# Patient Record
Sex: MALE | Race: WHITE | URBAN - METROPOLITAN AREA
[De-identification: names, ages, dates, MRNs, and addresses within clinical notes are randomized per-mention and may not be internally consistent; named-entity substitution may affect disease eponyms.]

---

## 2017-09-11 ENCOUNTER — EMERGENCY (EMERGENCY)
Facility: HOSPITAL | Age: 82
LOS: 0 days | Discharge: HOME | End: 2017-09-11
Attending: EMERGENCY MEDICINE | Admitting: EMERGENCY MEDICINE
Payer: COMMERCIAL

## 2017-09-11 VITALS
TEMPERATURE: 98 F | HEIGHT: 72 IN | OXYGEN SATURATION: 99 % | HEART RATE: 102 BPM | WEIGHT: 190.04 LBS | SYSTOLIC BLOOD PRESSURE: 156 MMHG | DIASTOLIC BLOOD PRESSURE: 87 MMHG | RESPIRATION RATE: 17 BRPM

## 2017-09-11 VITALS
DIASTOLIC BLOOD PRESSURE: 89 MMHG | HEART RATE: 92 BPM | SYSTOLIC BLOOD PRESSURE: 144 MMHG | TEMPERATURE: 98 F | OXYGEN SATURATION: 100 % | RESPIRATION RATE: 16 BRPM

## 2017-09-11 DIAGNOSIS — S22.009A UNSPECIFIED FRACTURE OF UNSPECIFIED THORACIC VERTEBRA, INITIAL ENCOUNTER FOR CLOSED FRACTURE: ICD-10-CM

## 2017-09-11 DIAGNOSIS — M54.2 CERVICALGIA: ICD-10-CM
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
## 2017-09-11 DIAGNOSIS — E78.5 HYPERLIPIDEMIA, UNSPECIFIED: ICD-10-CM

## 2017-09-11 DIAGNOSIS — I10 ESSENTIAL (PRIMARY) HYPERTENSION: ICD-10-CM

## 2017-09-11 DIAGNOSIS — Z98.890 OTHER SPECIFIED POSTPROCEDURAL STATES: Chronic | ICD-10-CM
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
## 2017-09-11 DIAGNOSIS — S16.1XXA STRAIN OF MUSCLE, FASCIA AND TENDON AT NECK LEVEL, INITIAL ENCOUNTER: ICD-10-CM
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
## 2017-09-11 DIAGNOSIS — E11.9 TYPE 2 DIABETES MELLITUS WITHOUT COMPLICATIONS: ICD-10-CM

## 2017-09-11 LAB
ALBUMIN SERPL ELPH-MCNC: 3.5 G/DL — SIGNIFICANT CHANGE UP (ref 3.3–5)
ALP SERPL-CCNC: 90 U/L — SIGNIFICANT CHANGE UP (ref 40–120)
ALT FLD-CCNC: 76 U/L — SIGNIFICANT CHANGE UP (ref 12–78)
ANION GAP SERPL CALC-SCNC: 4 MMOL/L — LOW (ref 5–17)
AST SERPL-CCNC: 70 U/L — HIGH (ref 15–37)
BASOPHILS # BLD AUTO: 0 K/UL — SIGNIFICANT CHANGE UP (ref 0–0.2)
BASOPHILS NFR BLD AUTO: 0.4 % — SIGNIFICANT CHANGE UP (ref 0–2)
BILIRUB SERPL-MCNC: 0.6 MG/DL — SIGNIFICANT CHANGE UP (ref 0.2–1.2)
BUN SERPL-MCNC: 20 MG/DL — SIGNIFICANT CHANGE UP (ref 7–23)
CALCIUM SERPL-MCNC: 9.4 MG/DL — SIGNIFICANT CHANGE UP (ref 8.5–10.1)
CHLORIDE SERPL-SCNC: 108 MMOL/L — SIGNIFICANT CHANGE UP (ref 96–108)
CO2 SERPL-SCNC: 31 MMOL/L — SIGNIFICANT CHANGE UP (ref 22–31)
CREAT SERPL-MCNC: 0.95 MG/DL — SIGNIFICANT CHANGE UP (ref 0.5–1.3)
EOSINOPHIL # BLD AUTO: 0.1 K/UL — SIGNIFICANT CHANGE UP (ref 0–0.5)
EOSINOPHIL NFR BLD AUTO: 2.1 % — SIGNIFICANT CHANGE UP (ref 0–6)
GLUCOSE SERPL-MCNC: 121 MG/DL — HIGH (ref 70–99)
HCT VFR BLD CALC: 38.3 % — LOW (ref 39–50)
HGB BLD-MCNC: 13.2 G/DL — SIGNIFICANT CHANGE UP (ref 13–17)
LYMPHOCYTES # BLD AUTO: 1 K/UL — SIGNIFICANT CHANGE UP (ref 1–3.3)
LYMPHOCYTES # BLD AUTO: 15.5 % — SIGNIFICANT CHANGE UP (ref 13–44)
MCHC RBC-ENTMCNC: 31.8 PG — SIGNIFICANT CHANGE UP (ref 27–34)
MCHC RBC-ENTMCNC: 34.6 GM/DL — SIGNIFICANT CHANGE UP (ref 32–36)
MCV RBC AUTO: 91.9 FL — SIGNIFICANT CHANGE UP (ref 80–100)
MONOCYTES # BLD AUTO: 0.3 K/UL — SIGNIFICANT CHANGE UP (ref 0–0.9)
MONOCYTES NFR BLD AUTO: 5 % — SIGNIFICANT CHANGE UP (ref 2–14)
NEUTROPHILS # BLD AUTO: 5.2 K/UL — SIGNIFICANT CHANGE UP (ref 1.8–7.4)
NEUTROPHILS NFR BLD AUTO: 77 % — SIGNIFICANT CHANGE UP (ref 43–77)
PLATELET # BLD AUTO: 219 K/UL — SIGNIFICANT CHANGE UP (ref 150–400)
POTASSIUM SERPL-MCNC: 4.3 MMOL/L — SIGNIFICANT CHANGE UP (ref 3.5–5.3)
POTASSIUM SERPL-SCNC: 4.3 MMOL/L — SIGNIFICANT CHANGE UP (ref 3.5–5.3)
PROT SERPL-MCNC: 7 GM/DL — SIGNIFICANT CHANGE UP (ref 6–8.3)
RBC # BLD: 4.16 M/UL — LOW (ref 4.2–5.8)
RBC # FLD: 13 % — SIGNIFICANT CHANGE UP (ref 10.3–14.5)
SODIUM SERPL-SCNC: 143 MMOL/L — SIGNIFICANT CHANGE UP (ref 135–145)
WBC # BLD: 6.7 K/UL — SIGNIFICANT CHANGE UP (ref 3.8–10.5)
WBC # FLD AUTO: 6.7 K/UL — SIGNIFICANT CHANGE UP (ref 3.8–10.5)

## 2017-09-11 PROCEDURE — 71250 CT THORAX DX C-: CPT | Mod: 26

## 2017-09-11 PROCEDURE — 70450 CT HEAD/BRAIN W/O DYE: CPT | Mod: 26

## 2017-09-11 PROCEDURE — 74176 CT ABD & PELVIS W/O CONTRAST: CPT | Mod: 26

## 2017-09-11 PROCEDURE — 72125 CT NECK SPINE W/O DYE: CPT | Mod: 26

## 2017-09-11 PROCEDURE — 99285 EMERGENCY DEPT VISIT HI MDM: CPT

## 2017-09-11 RX ORDER — SODIUM CHLORIDE 9 MG/ML
500 INJECTION INTRAMUSCULAR; INTRAVENOUS; SUBCUTANEOUS ONCE
Qty: 0 | Refills: 0 | Status: COMPLETED | OUTPATIENT
Start: 2017-09-11 | End: 2017-09-11

## 2017-09-11 RX ADMIN — SODIUM CHLORIDE 500 MILLILITER(S): 9 INJECTION INTRAMUSCULAR; INTRAVENOUS; SUBCUTANEOUS at 19:49

## 2017-09-11 NOTE — ED ADULT NURSE NOTE - OBJECTIVE STATEMENT
s/p mechanical fall backwards, lost balance while walking dog. Denies LOC or head injury, c/o neck pain, pain between shoulderblades.

## 2017-09-11 NOTE — ED PROVIDER NOTE - CARE PLAN
Principal Discharge DX:	Muscle strain  Secondary Diagnosis:	Fall, initial encounter  Secondary Diagnosis:	Compression fracture of thoracic vertebra, closed, initial encounter

## 2017-09-11 NOTE — ED PROVIDER NOTE - OBJECTIVE STATEMENT
82 yo male h/o rheumatoid arthritis, s/p abd hernia repair x2, presents s/p mechanical fall in his backyard today States the ground is not level so he slipped and fell backwards and hit his head on the ground. C/o neck pain and back pain at this time. Denies CP prior to and after the fall.  Denies SOB, palpitations, fever, chills, dysuria. Denies hx of HTN, HLD, DM, MI, stents, or strokes. 84 yo male h/o rheumatoid arthritis, s/p abd hernia repair x2, presents s/p mechanical fall in his backyard today States the ground is not level so he slipped and fell backwards and hit his head on the ground. No loc. C/o neck pain and back pain at this time. Denies CP prior to and after the fall.  Denies SOB, palpitations, fever, chills, dysuria. Denies hx of HTN, HLD, DM, MI, stents, or strokes.

## 2017-09-11 NOTE — ED PROVIDER NOTE - PROGRESS NOTE DETAILS
Ashok Boss (scribe) for Dr. Krishnamurthy: offered patient further evaluation and testing which patient declined, stating the fall was purely mechanical Ashok Boss (scribe) for Dr. Krishnamurthy: Spoke with Dr. Pearce (Trauma), states findings on CT are likely chronic and suggests to consult Spine to ensure no intervention is needed Ashok Boss ([facility]) for Dr. Krishnamurthy: No acute fracture found. Mild, chronic-appearing compression fx of T12. Discussed findings with patient and family. Will discharge pending Spine consult Ashok Boss (scribe) for Dr. Krishnamurthy: Spoke with Dr. Prabhakar (Spine), states that based on CT findings and pt's lack of neurological deficits, pt is stable to go home and follow up with him as an out-patient

## 2017-09-11 NOTE — ED PROVIDER NOTE - MUSCULOSKELETAL, MLM
+Tenderness to palpation of paracervical neck and parathoracic spine. +Tenderness to palpation of paracervical spine and para thoracic spine musculature.

## 2017-09-11 NOTE — ED PROVIDER NOTE - NS_ ATTENDINGSCRIBEDETAILS _ED_A_ED_FT
I Dillon Krishnamurthy MD saw and examined the patient. Scribe documented for me and under my  supervision. I have modified the documentation where necessary to reflect my history and physical exam findings.

## 2017-09-11 NOTE — ED ADULT TRIAGE NOTE - CHIEF COMPLAINT QUOTE
pt was walking the dog in his backyard , pt has dips in his grass , report slipping and falling , reports neck pain

## 2017-09-11 NOTE — ED PROVIDER NOTE - MEDICAL DECISION MAKING DETAILS
84 yo male with neck and back pain s/p mechanical fall. Plan CT head and chest. 84 yo male with neck and back pain s/p mechanical fall. Plan CT head, c-spine, chest, A/P. 84 yo male with neck and back pain s/p mechanical fall. Plan CT head, c-spine, chest, A/P.  Raghu WILSON: Spoke with Ortho spine Dr. Kirk. No acute intervention at this time. Outpatient follow up recommended. Patient offered in ED MRI but declines states he is ambulatory, has alleviation of his pain and wishes to leave. Prior to patient leaving ambulated patient. Patient urinating with no incontinence of retention. No fever or chills. 84 yo male with neck and back pain s/p mechanical fall. Plan CT head, c-spine, chest, A/P.  Raghu WILSON: Spoke with Ortho spine Dr. Prabhakar. No acute intervention at this time. Outpatient follow up recommended. Patient offered in ED MRI but declines states he is ambulatory, has alleviation of his pain and wishes to leave. Prior to patient leaving ambulated patient. Patient urinating with no incontinence of retention. No fever or chills.

## 2020-05-23 ENCOUNTER — EMERGENCY (EMERGENCY)
Facility: HOSPITAL | Age: 85
LOS: 0 days | Discharge: HOME | End: 2020-05-23
Attending: EMERGENCY MEDICINE
Payer: COMMERCIAL

## 2020-05-23 VITALS
DIASTOLIC BLOOD PRESSURE: 96 MMHG | SYSTOLIC BLOOD PRESSURE: 168 MMHG | HEART RATE: 86 BPM | OXYGEN SATURATION: 98 % | RESPIRATION RATE: 18 BRPM

## 2020-05-23 VITALS — WEIGHT: 179.9 LBS | HEIGHT: 74 IN

## 2020-05-23 DIAGNOSIS — M06.9 RHEUMATOID ARTHRITIS, UNSPECIFIED: ICD-10-CM

## 2020-05-23 DIAGNOSIS — K57.92 DIVERTICULITIS OF INTESTINE, PART UNSPECIFIED, WITHOUT PERFORATION OR ABSCESS WITHOUT BLEEDING: ICD-10-CM

## 2020-05-23 DIAGNOSIS — Z88.0 ALLERGY STATUS TO PENICILLIN: ICD-10-CM

## 2020-05-23 DIAGNOSIS — Z98.890 OTHER SPECIFIED POSTPROCEDURAL STATES: Chronic | ICD-10-CM

## 2020-05-23 DIAGNOSIS — R10.9 UNSPECIFIED ABDOMINAL PAIN: ICD-10-CM

## 2020-05-23 LAB
ALBUMIN SERPL ELPH-MCNC: 4.1 G/DL — SIGNIFICANT CHANGE UP (ref 3.3–5)
ALP SERPL-CCNC: 88 U/L — SIGNIFICANT CHANGE UP (ref 40–120)
ALT FLD-CCNC: 28 U/L — SIGNIFICANT CHANGE UP (ref 12–78)
ANION GAP SERPL CALC-SCNC: 6 MMOL/L — SIGNIFICANT CHANGE UP (ref 5–17)
APPEARANCE UR: CLEAR — SIGNIFICANT CHANGE UP
AST SERPL-CCNC: 23 U/L — SIGNIFICANT CHANGE UP (ref 15–37)
BASOPHILS # BLD AUTO: 0.01 K/UL — SIGNIFICANT CHANGE UP (ref 0–0.2)
BASOPHILS NFR BLD AUTO: 0.3 % — SIGNIFICANT CHANGE UP (ref 0–2)
BILIRUB SERPL-MCNC: 1 MG/DL — SIGNIFICANT CHANGE UP (ref 0.2–1.2)
BILIRUB UR-MCNC: NEGATIVE — SIGNIFICANT CHANGE UP
BUN SERPL-MCNC: 17 MG/DL — SIGNIFICANT CHANGE UP (ref 7–23)
CALCIUM SERPL-MCNC: 9.4 MG/DL — SIGNIFICANT CHANGE UP (ref 8.5–10.1)
CHLORIDE SERPL-SCNC: 110 MMOL/L — HIGH (ref 96–108)
CO2 SERPL-SCNC: 25 MMOL/L — SIGNIFICANT CHANGE UP (ref 22–31)
COLOR SPEC: YELLOW — SIGNIFICANT CHANGE UP
CREAT SERPL-MCNC: 1.05 MG/DL — SIGNIFICANT CHANGE UP (ref 0.5–1.3)
DIFF PNL FLD: NEGATIVE — SIGNIFICANT CHANGE UP
EOSINOPHIL # BLD AUTO: 0.13 K/UL — SIGNIFICANT CHANGE UP (ref 0–0.5)
EOSINOPHIL NFR BLD AUTO: 3.4 % — SIGNIFICANT CHANGE UP (ref 0–6)
GLUCOSE SERPL-MCNC: 120 MG/DL — HIGH (ref 70–99)
GLUCOSE UR QL: 50 MG/DL
HCT VFR BLD CALC: 43.8 % — SIGNIFICANT CHANGE UP (ref 39–50)
HGB BLD-MCNC: 15.1 G/DL — SIGNIFICANT CHANGE UP (ref 13–17)
IMM GRANULOCYTES NFR BLD AUTO: 0.3 % — SIGNIFICANT CHANGE UP (ref 0–1.5)
KETONES UR-MCNC: NEGATIVE — SIGNIFICANT CHANGE UP
LEUKOCYTE ESTERASE UR-ACNC: NEGATIVE — SIGNIFICANT CHANGE UP
LIDOCAIN IGE QN: 122 U/L — SIGNIFICANT CHANGE UP (ref 73–393)
LYMPHOCYTES # BLD AUTO: 1.62 K/UL — SIGNIFICANT CHANGE UP (ref 1–3.3)
LYMPHOCYTES # BLD AUTO: 42.6 % — SIGNIFICANT CHANGE UP (ref 13–44)
MCHC RBC-ENTMCNC: 31.9 PG — SIGNIFICANT CHANGE UP (ref 27–34)
MCHC RBC-ENTMCNC: 34.5 GM/DL — SIGNIFICANT CHANGE UP (ref 32–36)
MCV RBC AUTO: 92.4 FL — SIGNIFICANT CHANGE UP (ref 80–100)
MONOCYTES # BLD AUTO: 0.44 K/UL — SIGNIFICANT CHANGE UP (ref 0–0.9)
MONOCYTES NFR BLD AUTO: 11.6 % — SIGNIFICANT CHANGE UP (ref 2–14)
NEUTROPHILS # BLD AUTO: 1.59 K/UL — LOW (ref 1.8–7.4)
NEUTROPHILS NFR BLD AUTO: 41.8 % — LOW (ref 43–77)
NITRITE UR-MCNC: NEGATIVE — SIGNIFICANT CHANGE UP
PH UR: 5 — SIGNIFICANT CHANGE UP (ref 5–8)
PLATELET # BLD AUTO: 120 K/UL — LOW (ref 150–400)
POTASSIUM SERPL-MCNC: 4.3 MMOL/L — SIGNIFICANT CHANGE UP (ref 3.5–5.3)
POTASSIUM SERPL-SCNC: 4.3 MMOL/L — SIGNIFICANT CHANGE UP (ref 3.5–5.3)
PROT SERPL-MCNC: 7.4 GM/DL — SIGNIFICANT CHANGE UP (ref 6–8.3)
PROT UR-MCNC: NEGATIVE MG/DL — SIGNIFICANT CHANGE UP
RBC # BLD: 4.74 M/UL — SIGNIFICANT CHANGE UP (ref 4.2–5.8)
RBC # FLD: 13.2 % — SIGNIFICANT CHANGE UP (ref 10.3–14.5)
SODIUM SERPL-SCNC: 141 MMOL/L — SIGNIFICANT CHANGE UP (ref 135–145)
SP GR SPEC: 1.02 — SIGNIFICANT CHANGE UP (ref 1.01–1.02)
UROBILINOGEN FLD QL: NEGATIVE MG/DL — SIGNIFICANT CHANGE UP
WBC # BLD: 3.8 K/UL — SIGNIFICANT CHANGE UP (ref 3.8–10.5)
WBC # FLD AUTO: 3.8 K/UL — SIGNIFICANT CHANGE UP (ref 3.8–10.5)

## 2020-05-23 PROCEDURE — 99283 EMERGENCY DEPT VISIT LOW MDM: CPT

## 2020-05-23 PROCEDURE — 83690 ASSAY OF LIPASE: CPT

## 2020-05-23 PROCEDURE — 74177 CT ABD & PELVIS W/CONTRAST: CPT | Mod: 26

## 2020-05-23 PROCEDURE — 80053 COMPREHEN METABOLIC PANEL: CPT

## 2020-05-23 PROCEDURE — 87086 URINE CULTURE/COLONY COUNT: CPT

## 2020-05-23 PROCEDURE — 74177 CT ABD & PELVIS W/CONTRAST: CPT

## 2020-05-23 PROCEDURE — 85025 COMPLETE CBC W/AUTO DIFF WBC: CPT

## 2020-05-23 PROCEDURE — 99284 EMERGENCY DEPT VISIT MOD MDM: CPT | Mod: 25

## 2020-05-23 PROCEDURE — 81003 URINALYSIS AUTO W/O SCOPE: CPT

## 2020-05-23 PROCEDURE — 36415 COLL VENOUS BLD VENIPUNCTURE: CPT

## 2020-05-23 RX ORDER — METRONIDAZOLE 500 MG
500 TABLET ORAL ONCE
Refills: 0 | Status: COMPLETED | OUTPATIENT
Start: 2020-05-23 | End: 2020-05-23

## 2020-05-23 RX ORDER — CIPROFLOXACIN LACTATE 400MG/40ML
500 VIAL (ML) INTRAVENOUS ONCE
Refills: 0 | Status: COMPLETED | OUTPATIENT
Start: 2020-05-23 | End: 2020-05-23

## 2020-05-23 RX ORDER — METRONIDAZOLE 500 MG
1 TABLET ORAL
Qty: 21 | Refills: 0
Start: 2020-05-23 | End: 2020-05-29

## 2020-05-23 RX ORDER — MOXIFLOXACIN HYDROCHLORIDE TABLETS, 400 MG 400 MG/1
1 TABLET, FILM COATED ORAL
Qty: 14 | Refills: 0
Start: 2020-05-23 | End: 2020-05-29

## 2020-05-23 RX ADMIN — Medication 500 MILLIGRAM(S): at 12:51

## 2020-05-23 NOTE — ED PROVIDER NOTE - CARE PROVIDER_API CALL
Zeke Cali)  Gastroenterology; Internal Medicine  [address] [place] [address]  Phone: [phone]  Fax: [phone]  Follow Up Time: Urgent

## 2020-05-23 NOTE — ED PROVIDER NOTE - PROGRESS NOTE DETAILS
Results reviewed and discussed with pt. pt with mild diverticulitis, will tx with cipro/flagyl as pt as pcn allergy. Made aware of mesenteric panniculitis, will FU with GI. Discussed importance of close FU with PMD. Pt asked to return to ED immediately for any new or concerning sx or worsening. Pt acknowledges and understands plan -Saurabh Cazares PA-C pt seen and ex with PA. 87 yo male with llq pain. for 1 wk. mild ttp llq. no rebound no guear. pt well appearing. stable vs .plan for labs, ct, ivfs, reEval MD JEN pt seen and ex with PA. 87 yo male with llq pain. for 1 wk. mild ttp llq. no rebound no guarding. pt well appearing. stable vs .plan for labs, ct, ivfs, reEval MD JEN

## 2020-05-23 NOTE — ED PROVIDER NOTE - NSFOLLOWUPINSTRUCTIONS_ED_ALL_ED_FT
Please follow up with your Primary MD in 2-3 days. Return to ED immediately for any new or concerning symptoms or worsening symptoms.     Diverticulitis    WHAT YOU NEED TO KNOW:    Diverticulitis is a condition that causes small pockets along your intestine called diverticula to become inflamed or infected. This is caused by hard bowel movements, food, or bacteria that get stuck in the pockets.         DISCHARGE INSTRUCTIONS:    Return to the emergency department if:     You have bowel movement or foul-smelling discharge leaking from your vagina or in your urine.      You have severe diarrhea.      You urinate less than usual or not at all.      You are not able to have a bowel movement.      You cannot stop vomiting.       You have severe abdominal pain, a fever, and your abdomen is larger than usual.       You have new or increased blood in your bowel movements.     Contact your healthcare provider if:     You have pain when you urinate.      Your symptoms get worse or do not go away.       You have questions or concerns about your condition or care.     Medicines:     Antibiotics may be given to help treat a bacterial infection.      Prescription pain medicine may be given. Ask your healthcare provider how to take this medicine safely. Some prescription pain medicines contain acetaminophen. Do not take other medicines that contain acetaminophen without talking to your healthcare provider. Too much acetaminophen may cause liver damage. Prescription pain medicine may cause constipation. Ask your healthcare provider how to prevent or treat constipation.       Take your medicine as directed. Contact your healthcare provider if you think your medicine is not helping or if you have side effects. Tell him or her if you are allergic to any medicine. Keep a list of the medicines, vitamins, and herbs you take. Include the amounts, and when and why you take them. Bring the list or the pill bottles to follow-up visits. Carry your medicine list with you in case of an emergency.    Clear liquid diet: A clear liquid diet includes any liquids that you can see through. Examples include water, ginger-ailyn, cranberry or apple juice, frozen fruit ice, or broth. Stay on a clear liquid diet until your symptoms are gone, or as directed.     Follow up with your healthcare provider as directed: You may need to return for a colonoscopy. When your symptoms are gone, you may need a low-fat, high-fiber diet to prevent diverticulitis from developing again. Your healthcare provider or dietitian can help you create meal plans. Write down your questions so you remember to ask them during your visits.

## 2020-05-23 NOTE — ED PROVIDER NOTE - PATIENT PORTAL LINK FT
You can access the FollowMyHealth Patient Portal offered by [facility] by registering at the following website: [email or web address]. By joining [facility]’s FollowMyHealth portal, you will also be able to view your health information using other applications (apps) compatible with our system.

## 2020-05-23 NOTE — ED PROVIDER NOTE - OBJECTIVE STATEMENT
85 y/o M presents with abdominal pain x 1 week. Pt reports LLQ pain  moderate in intensity that occasionally radiates around the side to his back, worse when taking a deep breath in and having a BM. He has been straining when having a BM. Reports intermittent dysuria. Denies fever/chills, n/v, Cp, SOB, rectal bleeding, or other complaints at this time. -Saurabh Cazares PA-C

## 2020-05-24 LAB
CULTURE RESULTS: SIGNIFICANT CHANGE UP
SPECIMEN SOURCE: SIGNIFICANT CHANGE UP

## 2021-03-07 ENCOUNTER — INPATIENT (INPATIENT)
Facility: HOSPITAL | Age: 86
LOS: 1 days | Discharge: HOME | DRG: 66 | End: 2021-03-09
Attending: FAMILY MEDICINE | Admitting: INTERNAL MEDICINE
Payer: MEDICARE

## 2021-03-07 VITALS
WEIGHT: 184.97 LBS | SYSTOLIC BLOOD PRESSURE: 155 MMHG | HEART RATE: 72 BPM | DIASTOLIC BLOOD PRESSURE: 70 MMHG | RESPIRATION RATE: 18 BRPM | TEMPERATURE: 97 F | OXYGEN SATURATION: 95 %

## 2021-03-07 DIAGNOSIS — Z98.890 OTHER SPECIFIED POSTPROCEDURAL STATES: Chronic | ICD-10-CM

## 2021-03-07 PROCEDURE — 71045 X-RAY EXAM CHEST 1 VIEW: CPT | Mod: 26

## 2021-03-07 NOTE — ED PROVIDER NOTE - CONSTITUTIONAL, MLM
normal... Well appearing, awake, alert, oriented to person, place, time/situation and in no apparent distress.  Normal facial symmetry

## 2021-03-07 NOTE — ED ADULT NURSE NOTE - OBJECTIVE STATEMENT
pt presents with slurred speech that started this morning when he woke up. denies difficulty swallowing or difficulty ambulating. no weakness noted

## 2021-03-07 NOTE — ED PROVIDER NOTE - OBJECTIVE STATEMENT
Pt. is a 85 yo M with a hx of diverticulitis, hernia repair, rheumatoid arthritis BIB ambulance for slurred speech.  Per EMS family noticed slurring of his words around 10AM today.  Patient states he woke up like that and states "I've been annoyed by it all day."  Patient denies headache, falls, chest pain, back pain, weakness or numbness.  +nausea daily for weeks.  Eating meals normally without trouble swallowing, denies pain in mouth, fever, or vomiting. PMD: Rubin

## 2021-03-07 NOTE — ED PROVIDER NOTE - CLINICAL SUMMARY MEDICAL DECISION MAKING FREE TEXT BOX
Slurred speech; labs, EKG, CT head to r/o stroke.  Aspirin to be given and patient to be admitted to tele with MRI tomorrow and neuro consult in AM. NIHSS score = 1 on arrival.

## 2021-03-07 NOTE — ED ADULT TRIAGE NOTE - CHIEF COMPLAINT QUOTE
Pt BIBA with c/o of slurred speech since 10am.  Pt with no facial paralysis, no weakness, denies blurry vision/headaches.  PT with no complaints other than slurred speech.  Pt able to speak in full sentences.  MD Banks made aware

## 2021-03-08 ENCOUNTER — TRANSCRIPTION ENCOUNTER (OUTPATIENT)
Age: 86
End: 2021-03-08

## 2021-03-08 VITALS
HEART RATE: 77 BPM | DIASTOLIC BLOOD PRESSURE: 63 MMHG | SYSTOLIC BLOOD PRESSURE: 129 MMHG | TEMPERATURE: 98 F | OXYGEN SATURATION: 99 % | RESPIRATION RATE: 18 BRPM

## 2021-03-08 DIAGNOSIS — R47.81 SLURRED SPEECH: ICD-10-CM

## 2021-03-08 DIAGNOSIS — Z90.49 ACQUIRED ABSENCE OF OTHER SPECIFIED PARTS OF DIGESTIVE TRACT: Chronic | ICD-10-CM

## 2021-03-08 PROBLEM — K57.92 DIVERTICULITIS OF INTESTINE, PART UNSPECIFIED, WITHOUT PERFORATION OR ABSCESS WITHOUT BLEEDING: Chronic | Status: ACTIVE | Noted: 2020-05-23

## 2021-03-08 LAB
A1C WITH ESTIMATED AVERAGE GLUCOSE RESULT: 5.7 % — HIGH (ref 4–5.6)
ALBUMIN SERPL ELPH-MCNC: 3.5 G/DL — SIGNIFICANT CHANGE UP (ref 3.3–5)
ALP SERPL-CCNC: 111 U/L — SIGNIFICANT CHANGE UP (ref 40–120)
ALT FLD-CCNC: 21 U/L — SIGNIFICANT CHANGE UP (ref 12–78)
ANION GAP SERPL CALC-SCNC: 5 MMOL/L — SIGNIFICANT CHANGE UP (ref 5–17)
ANION GAP SERPL CALC-SCNC: 6 MMOL/L — SIGNIFICANT CHANGE UP (ref 5–17)
APPEARANCE UR: CLEAR — SIGNIFICANT CHANGE UP
APTT BLD: 29.8 SEC — SIGNIFICANT CHANGE UP (ref 27.5–35.5)
AST SERPL-CCNC: 15 U/L — SIGNIFICANT CHANGE UP (ref 15–37)
BASOPHILS # BLD AUTO: 0.01 K/UL — SIGNIFICANT CHANGE UP (ref 0–0.2)
BASOPHILS NFR BLD AUTO: 0.2 % — SIGNIFICANT CHANGE UP (ref 0–2)
BILIRUB SERPL-MCNC: 0.7 MG/DL — SIGNIFICANT CHANGE UP (ref 0.2–1.2)
BILIRUB UR-MCNC: NEGATIVE — SIGNIFICANT CHANGE UP
BUN SERPL-MCNC: 14 MG/DL — SIGNIFICANT CHANGE UP (ref 7–23)
BUN SERPL-MCNC: 15 MG/DL — SIGNIFICANT CHANGE UP (ref 7–23)
CALCIUM SERPL-MCNC: 9 MG/DL — SIGNIFICANT CHANGE UP (ref 8.5–10.1)
CALCIUM SERPL-MCNC: 9.4 MG/DL — SIGNIFICANT CHANGE UP (ref 8.5–10.1)
CHLORIDE SERPL-SCNC: 111 MMOL/L — HIGH (ref 96–108)
CHLORIDE SERPL-SCNC: 112 MMOL/L — HIGH (ref 96–108)
CHOLEST SERPL-MCNC: 200 MG/DL — HIGH
CO2 SERPL-SCNC: 25 MMOL/L — SIGNIFICANT CHANGE UP (ref 22–31)
CO2 SERPL-SCNC: 26 MMOL/L — SIGNIFICANT CHANGE UP (ref 22–31)
COLOR SPEC: YELLOW — SIGNIFICANT CHANGE UP
CREAT SERPL-MCNC: 1.06 MG/DL — SIGNIFICANT CHANGE UP (ref 0.5–1.3)
CREAT SERPL-MCNC: 1.06 MG/DL — SIGNIFICANT CHANGE UP (ref 0.5–1.3)
DIFF PNL FLD: NEGATIVE — SIGNIFICANT CHANGE UP
EOSINOPHIL # BLD AUTO: 0.38 K/UL — SIGNIFICANT CHANGE UP (ref 0–0.5)
EOSINOPHIL NFR BLD AUTO: 5.9 % — SIGNIFICANT CHANGE UP (ref 0–6)
ESTIMATED AVERAGE GLUCOSE: 117 MG/DL — HIGH (ref 68–114)
GLUCOSE SERPL-MCNC: 140 MG/DL — HIGH (ref 70–99)
GLUCOSE SERPL-MCNC: 142 MG/DL — HIGH (ref 70–99)
GLUCOSE UR QL: 250 MG/DL
HCT VFR BLD CALC: 42.9 % — SIGNIFICANT CHANGE UP (ref 39–50)
HCT VFR BLD CALC: 43.5 % — SIGNIFICANT CHANGE UP (ref 39–50)
HDLC SERPL-MCNC: 44 MG/DL — SIGNIFICANT CHANGE UP
HGB BLD-MCNC: 14.4 G/DL — SIGNIFICANT CHANGE UP (ref 13–17)
HGB BLD-MCNC: 14.8 G/DL — SIGNIFICANT CHANGE UP (ref 13–17)
IMM GRANULOCYTES NFR BLD AUTO: 0.2 % — SIGNIFICANT CHANGE UP (ref 0–1.5)
INR BLD: 1.04 RATIO — SIGNIFICANT CHANGE UP (ref 0.88–1.16)
KETONES UR-MCNC: NEGATIVE — SIGNIFICANT CHANGE UP
LEUKOCYTE ESTERASE UR-ACNC: NEGATIVE — SIGNIFICANT CHANGE UP
LIPID PNL WITH DIRECT LDL SERPL: 124 MG/DL — HIGH
LYMPHOCYTES # BLD AUTO: 2.97 K/UL — SIGNIFICANT CHANGE UP (ref 1–3.3)
LYMPHOCYTES # BLD AUTO: 46.2 % — HIGH (ref 13–44)
MCHC RBC-ENTMCNC: 31.6 PG — SIGNIFICANT CHANGE UP (ref 27–34)
MCHC RBC-ENTMCNC: 31.9 PG — SIGNIFICANT CHANGE UP (ref 27–34)
MCHC RBC-ENTMCNC: 33.6 GM/DL — SIGNIFICANT CHANGE UP (ref 32–36)
MCHC RBC-ENTMCNC: 34 GM/DL — SIGNIFICANT CHANGE UP (ref 32–36)
MCV RBC AUTO: 93.8 FL — SIGNIFICANT CHANGE UP (ref 80–100)
MCV RBC AUTO: 94.3 FL — SIGNIFICANT CHANGE UP (ref 80–100)
MONOCYTES # BLD AUTO: 0.7 K/UL — SIGNIFICANT CHANGE UP (ref 0–0.9)
MONOCYTES NFR BLD AUTO: 10.9 % — SIGNIFICANT CHANGE UP (ref 2–14)
NEUTROPHILS # BLD AUTO: 2.36 K/UL — SIGNIFICANT CHANGE UP (ref 1.8–7.4)
NEUTROPHILS NFR BLD AUTO: 36.6 % — LOW (ref 43–77)
NITRITE UR-MCNC: NEGATIVE — SIGNIFICANT CHANGE UP
NON HDL CHOLESTEROL: 156 MG/DL — HIGH
PH UR: 5 — SIGNIFICANT CHANGE UP (ref 5–8)
PLATELET # BLD AUTO: 193 K/UL — SIGNIFICANT CHANGE UP (ref 150–400)
PLATELET # BLD AUTO: 201 K/UL — SIGNIFICANT CHANGE UP (ref 150–400)
POTASSIUM SERPL-MCNC: 4.1 MMOL/L — SIGNIFICANT CHANGE UP (ref 3.5–5.3)
POTASSIUM SERPL-MCNC: 4.1 MMOL/L — SIGNIFICANT CHANGE UP (ref 3.5–5.3)
POTASSIUM SERPL-SCNC: 4.1 MMOL/L — SIGNIFICANT CHANGE UP (ref 3.5–5.3)
POTASSIUM SERPL-SCNC: 4.1 MMOL/L — SIGNIFICANT CHANGE UP (ref 3.5–5.3)
PROT SERPL-MCNC: 7.3 GM/DL — SIGNIFICANT CHANGE UP (ref 6–8.3)
PROT UR-MCNC: NEGATIVE MG/DL — SIGNIFICANT CHANGE UP
PROTHROM AB SERPL-ACNC: 12.2 SEC — SIGNIFICANT CHANGE UP (ref 10.6–13.6)
RBC # BLD: 4.55 M/UL — SIGNIFICANT CHANGE UP (ref 4.2–5.8)
RBC # BLD: 4.64 M/UL — SIGNIFICANT CHANGE UP (ref 4.2–5.8)
RBC # FLD: 12.9 % — SIGNIFICANT CHANGE UP (ref 10.3–14.5)
RBC # FLD: 13 % — SIGNIFICANT CHANGE UP (ref 10.3–14.5)
SARS-COV-2 IGG SERPL QL IA: NEGATIVE — SIGNIFICANT CHANGE UP
SARS-COV-2 IGM SERPL IA-ACNC: 0.07 INDEX — SIGNIFICANT CHANGE UP
SARS-COV-2 RNA SPEC QL NAA+PROBE: SIGNIFICANT CHANGE UP
SODIUM SERPL-SCNC: 142 MMOL/L — SIGNIFICANT CHANGE UP (ref 135–145)
SODIUM SERPL-SCNC: 143 MMOL/L — SIGNIFICANT CHANGE UP (ref 135–145)
SP GR SPEC: 1.01 — SIGNIFICANT CHANGE UP (ref 1.01–1.02)
TRIGL SERPL-MCNC: 163 MG/DL — HIGH
TROPONIN I SERPL-MCNC: <0.015 NG/ML — SIGNIFICANT CHANGE UP (ref 0.01–0.04)
UROBILINOGEN FLD QL: NEGATIVE MG/DL — SIGNIFICANT CHANGE UP
WBC # BLD: 5.38 K/UL — SIGNIFICANT CHANGE UP (ref 3.8–10.5)
WBC # BLD: 6.43 K/UL — SIGNIFICANT CHANGE UP (ref 3.8–10.5)
WBC # FLD AUTO: 5.38 K/UL — SIGNIFICANT CHANGE UP (ref 3.8–10.5)
WBC # FLD AUTO: 6.43 K/UL — SIGNIFICANT CHANGE UP (ref 3.8–10.5)

## 2021-03-08 PROCEDURE — 81003 URINALYSIS AUTO W/O SCOPE: CPT

## 2021-03-08 PROCEDURE — 80061 LIPID PANEL: CPT

## 2021-03-08 PROCEDURE — 86769 SARS-COV-2 COVID-19 ANTIBODY: CPT

## 2021-03-08 PROCEDURE — 70551 MRI BRAIN STEM W/O DYE: CPT

## 2021-03-08 PROCEDURE — 97162 PT EVAL MOD COMPLEX 30 MIN: CPT | Mod: GP

## 2021-03-08 PROCEDURE — 97116 GAIT TRAINING THERAPY: CPT | Mod: GP

## 2021-03-08 PROCEDURE — 99222 1ST HOSP IP/OBS MODERATE 55: CPT

## 2021-03-08 PROCEDURE — U0005: CPT

## 2021-03-08 PROCEDURE — 0031A: CPT

## 2021-03-08 PROCEDURE — 93306 TTE W/DOPPLER COMPLETE: CPT | Mod: 26

## 2021-03-08 PROCEDURE — 92610 EVALUATE SWALLOWING FUNCTION: CPT | Mod: GN

## 2021-03-08 PROCEDURE — 92523 SPEECH SOUND LANG COMPREHEN: CPT | Mod: GN

## 2021-03-08 PROCEDURE — 93306 TTE W/DOPPLER COMPLETE: CPT

## 2021-03-08 PROCEDURE — 93010 ELECTROCARDIOGRAM REPORT: CPT

## 2021-03-08 PROCEDURE — 70551 MRI BRAIN STEM W/O DYE: CPT | Mod: 26

## 2021-03-08 PROCEDURE — 85027 COMPLETE CBC AUTOMATED: CPT

## 2021-03-08 PROCEDURE — 99223 1ST HOSP IP/OBS HIGH 75: CPT

## 2021-03-08 PROCEDURE — 83036 HEMOGLOBIN GLYCOSYLATED A1C: CPT

## 2021-03-08 PROCEDURE — 80048 BASIC METABOLIC PNL TOTAL CA: CPT

## 2021-03-08 PROCEDURE — 87635 SARS-COV-2 COVID-19 AMP PRB: CPT

## 2021-03-08 PROCEDURE — 97530 THERAPEUTIC ACTIVITIES: CPT | Mod: GP

## 2021-03-08 PROCEDURE — 70544 MR ANGIOGRAPHY HEAD W/O DYE: CPT | Mod: 26,59

## 2021-03-08 PROCEDURE — 72198 MR ANGIO PELVIS W/O & W/DYE: CPT | Mod: 26

## 2021-03-08 PROCEDURE — 70544 MR ANGIOGRAPHY HEAD W/O DYE: CPT

## 2021-03-08 PROCEDURE — 70547 MR ANGIOGRAPHY NECK W/O DYE: CPT

## 2021-03-08 PROCEDURE — 36415 COLL VENOUS BLD VENIPUNCTURE: CPT

## 2021-03-08 PROCEDURE — 70450 CT HEAD/BRAIN W/O DYE: CPT | Mod: 26

## 2021-03-08 RX ORDER — ACETAMINOPHEN 500 MG
650 TABLET ORAL EVERY 6 HOURS
Refills: 0 | Status: DISCONTINUED | OUTPATIENT
Start: 2021-03-08 | End: 2021-03-09

## 2021-03-08 RX ORDER — ALPRAZOLAM 0.25 MG
0.5 TABLET ORAL ONCE
Refills: 0 | Status: DISCONTINUED | OUTPATIENT
Start: 2021-03-08 | End: 2021-03-08

## 2021-03-08 RX ORDER — ALPRAZOLAM 0.25 MG
0.5 TABLET ORAL ONCE
Refills: 0 | Status: COMPLETED | OUTPATIENT
Start: 2021-03-08 | End: 2021-03-15

## 2021-03-08 RX ORDER — CLOPIDOGREL BISULFATE 75 MG/1
75 TABLET, FILM COATED ORAL DAILY
Refills: 0 | Status: DISCONTINUED | OUTPATIENT
Start: 2021-03-08 | End: 2021-03-09

## 2021-03-08 RX ORDER — ASPIRIN/CALCIUM CARB/MAGNESIUM 324 MG
81 TABLET ORAL DAILY
Refills: 0 | Status: DISCONTINUED | OUTPATIENT
Start: 2021-03-08 | End: 2021-03-09

## 2021-03-08 RX ORDER — ASPIRIN/CALCIUM CARB/MAGNESIUM 324 MG
325 TABLET ORAL ONCE
Refills: 0 | Status: COMPLETED | OUTPATIENT
Start: 2021-03-08 | End: 2021-03-08

## 2021-03-08 RX ORDER — METHOTREXATE 2.5 MG/1
0 TABLET ORAL
Qty: 0 | Refills: 0 | DISCHARGE

## 2021-03-08 RX ORDER — ONDANSETRON 8 MG/1
4 TABLET, FILM COATED ORAL EVERY 6 HOURS
Refills: 0 | Status: DISCONTINUED | OUTPATIENT
Start: 2021-03-08 | End: 2021-03-09

## 2021-03-08 RX ORDER — ATORVASTATIN CALCIUM 80 MG/1
40 TABLET, FILM COATED ORAL AT BEDTIME
Refills: 0 | Status: DISCONTINUED | OUTPATIENT
Start: 2021-03-08 | End: 2021-03-09

## 2021-03-08 RX ORDER — SODIUM CHLORIDE 9 MG/ML
1000 INJECTION INTRAMUSCULAR; INTRAVENOUS; SUBCUTANEOUS
Refills: 0 | Status: DISCONTINUED | OUTPATIENT
Start: 2021-03-08 | End: 2021-03-08

## 2021-03-08 RX ORDER — ENOXAPARIN SODIUM 100 MG/ML
40 INJECTION SUBCUTANEOUS AT BEDTIME
Refills: 0 | Status: DISCONTINUED | OUTPATIENT
Start: 2021-03-08 | End: 2021-03-09

## 2021-03-08 RX ADMIN — SODIUM CHLORIDE 80 MILLILITER(S): 9 INJECTION INTRAMUSCULAR; INTRAVENOUS; SUBCUTANEOUS at 01:06

## 2021-03-08 RX ADMIN — Medication 81 MILLIGRAM(S): at 09:33

## 2021-03-08 RX ADMIN — Medication 0.5 MILLIGRAM(S): at 10:32

## 2021-03-08 RX ADMIN — Medication 0.5 MILLIGRAM(S): at 16:07

## 2021-03-08 RX ADMIN — ENOXAPARIN SODIUM 40 MILLIGRAM(S): 100 INJECTION SUBCUTANEOUS at 22:17

## 2021-03-08 RX ADMIN — Medication 325 MILLIGRAM(S): at 01:06

## 2021-03-08 RX ADMIN — CLOPIDOGREL BISULFATE 75 MILLIGRAM(S): 75 TABLET, FILM COATED ORAL at 12:10

## 2021-03-08 RX ADMIN — ATORVASTATIN CALCIUM 40 MILLIGRAM(S): 80 TABLET, FILM COATED ORAL at 22:17

## 2021-03-08 NOTE — PHYSICAL THERAPY INITIAL EVALUATION ADULT - RANGE OF MOTION EXAMINATION, REHAB EVAL
b/l hands with RA joint degeneration/bilateral upper extremity ROM was WFL (within functional limits)/bilateral lower extremity ROM was WFL (within functional limits)

## 2021-03-08 NOTE — H&P ADULT - NEUROLOGICAL DETAILS
alert and oriented x 3/responds to pain/responds to verbal commands/sensation intact/cranial nerves intact/no spontaneous movement/normal strength

## 2021-03-08 NOTE — PROVIDER CONTACT NOTE (OTHER) - SITUATION
rule out CVA / TIA    left message with ans service for dr to see pt in the morning Psych/Behavioral

## 2021-03-08 NOTE — SWALLOW BEDSIDE ASSESSMENT ADULT - SWALLOW EVAL: RECOMMENDED DIET
SUGGEST A REGULAR TEXTURE DIET WITH THIN LIQUIDS AS HE TOLERATES THE SAME FROM AN OROPHARYNGEAL SWALLOWING STANCE ON EXAM.

## 2021-03-08 NOTE — SWALLOW BEDSIDE ASSESSMENT ADULT - SWALLOW EVAL: DIAGNOSIS
1) The pt demonstrates Oropharyngeal Swallowing abilities which subjectively appears to be grossly within functional parameters for age. NO behavioral aspiration signs. Odynophagia denied.  2) The pt was alert and interactive. His auditory comprehension was functional and pt was able to verbalize during communicative probes. At these times, his speech output was marked by mild articulatory slurring c/w functional Dysarthria. His speech output was intelligible nonetheless and his verbalizations were fluent/linguistically intact/contextually appropriate. Pt is able to verbalize his intents, despite mild functional Dysarthria.

## 2021-03-08 NOTE — SWALLOW BEDSIDE ASSESSMENT ADULT - SLP GENERAL OBSERVATIONS
The pt was alert and interactive. His auditory comprehension was functional and pt was able to verbalize during communicative probes. At these times, his speech output was marked by mild articulatory slurring c/w functional Dysarthria. His speech output was intelligible nonetheless and his verbalizations were fluent/linguistically intact/contextually appropriate. Pt is able to verbalize his intents, despite mild functional Dysarthria.

## 2021-03-08 NOTE — H&P ADULT - ASSESSMENT
87 YO M with a PMH of RA and Diverticulitis presents for Slurred speech since 3/7/21 @ 10 AM. Patient states that he has no other symptoms.    #Slurred Speech  #CVA/TIA  - CT Head negative for acute pathology  - NIHSS = 1  - Neuro Consult for AM  - Neuro Check Q4  - ECG   - Start Asprin 325  - MRI Brain?   - Admit, Monitor on Telemetry  - Speech and Swallow Consult     #HTN  - /70  - No PMH of HTN  - Start Amlodipine 5mg?  - DASH Diet    #Elevated Glucose  - A1c in AM    #RA  - on Humera at Home    #Diet  - DASH Diet        #DVT PPX  - SCD  IMPROVE VTE Individual Risk Assessment    RISK                                                                Points    [  ] Previous VTE                                                  3    [  ] Thrombophilia                                               2    [  ] Lower limb paralysis                                      2        (unable to hold up >15 seconds)      [  ] Current Cancer                                              2         (within 6 months)    [  ] Immobilization > 24 hrs                                1    [  ] ICU/CCU stay > 24 hours                              1    [ X ] Age > 60                                                      1    IMPROVE VTE Score ___1______    IMPROVE Score 0-1: Low Risk, No VTE prophylaxis required for most patients, encourage ambulation.   IMPROVE Score 2-3: At risk, pharmacologic VTE prophylaxis is indicated for most patients (in the absence of a contraindication)  IMPROVE Score > or = 4: High Risk, pharmacologic VTE prophylaxis is indicated for most patients (in the absence of a contraindication) 87 YO M with a PMH of RA and Diverticulitis presents for Slurred speech since 3/7/21 @ 10 AM. Patient states that he has no other symptoms.    #Slurred Speech  #CVA/TIA  - CT Head negative for acute pathology  - NIHSS = 1  - Neuro Consult for AM  - Neuro Check Q4  - ECG   - Start Asprin 325  - MRI Brain?   - Admit, Monitor on Telemetry  - Speech and Swallow Consult     #HTN  - /80  - No PMH of HTN  - Start Amlodipine 5mg?  - DASH Diet    #Elevated Glucose  - A1c in AM    #RA  - on Humera at Home    #Diet  - DASH Diet      #DVT PPX  - SCD  IMPROVE VTE Individual Risk Assessment    RISK                                                                Points    [  ] Previous VTE                                                  3    [  ] Thrombophilia                                               2    [  ] Lower limb paralysis                                      2        (unable to hold up >15 seconds)      [  ] Current Cancer                                              2         (within 6 months)    [  ] Immobilization > 24 hrs                                1    [  ] ICU/CCU stay > 24 hours                              1    [ X ] Age > 60                                                      1    IMPROVE VTE Score ___1______    IMPROVE Score 0-1: Low Risk, No VTE prophylaxis required for most patients, encourage ambulation.   IMPROVE Score 2-3: At risk, pharmacologic VTE prophylaxis is indicated for most patients (in the absence of a contraindication)  IMPROVE Score > or = 4: High Risk, pharmacologic VTE prophylaxis is indicated for most patients (in the absence of a contraindication) 87 YO M with a PMH of RA and Diverticulitis presents for Slurred speech since 3/7/21 @ 10 AM. Patient states that he has no other symptoms.    #Slurred Speech  #CVA/TIA  - CT Head negative for acute pathology  - NIHSS = 1  - Neuro Consult for AM  - Neuro Check Q4  - Start Asprin 81  - Start Atorvastatin 40  - MRI Brain, MRA ordered  - PT Consult   - Continue to Monitor on Telemetry  - Speech and Swallow Consult     #HTN  - /80  - No PMH of HTN  - Permissive HTN  - DASH Diet    #Elevated Glucose  - A1c in AM    #RA  - on Humera at Home    #Diet  - DASH Diet  - Patient has no trouble swallowing  - Speech and Swallow Consult Ordered.     #DVT PPX  - SCD  IMPROVE VTE Individual Risk Assessment    RISK                                                                Points    [  ] Previous VTE                                                  3    [  ] Thrombophilia                                               2    [  ] Lower limb paralysis                                      2        (unable to hold up >15 seconds)      [  ] Current Cancer                                              2         (within 6 months)    [  ] Immobilization > 24 hrs                                1    [  ] ICU/CCU stay > 24 hours                              1    [ X ] Age > 60                                                      1    IMPROVE VTE Score ___1______    IMPROVE Score 0-1: Low Risk, No VTE prophylaxis required for most patients, encourage ambulation.   IMPROVE Score 2-3: At risk, pharmacologic VTE prophylaxis is indicated for most patients (in the absence of a contraindication)  IMPROVE Score > or = 4: High Risk, pharmacologic VTE prophylaxis is indicated for most patients (in the absence of a contraindication)

## 2021-03-08 NOTE — CONSULT NOTE ADULT - SUBJECTIVE AND OBJECTIVE BOX
CC: 86 y old male who presents with a chief complaint of Slurred Speech (08 Mar 2021 09:12)      HPI:  85 YO M with a PMHx of RA, Diverticulitis, presents to ER 23.40 hrs on 3/7/21 with c/o Slurred Speech that began at 10 AM when he woke up. Patient is generally healthy, he has started using a cane for support for his balance and arthritis, regularly f/u with his rheumatologist. His slurred speech was noted by his family members, he also felt he was talking like he had marbles in his mouth, it progressed through the day, was not associated with visual blurring, facial droop, N, V, vertigo, focal motor weakness, paresthesias or worsening gait / falls. He does admit he has been nauseous intermittently for the past few weeks.    CT head in ED - no acute lesions, pt was started on ASA/Atorvastatin, stroke w/u ordered      PAST MEDICAL & SURGICAL HISTORY:  Diverticulitis  Rheumatoid arthritis  History of appendectomy  History of hernia repair        FAMILY HISTORY: No relevant history      Social Hx: Patient lives at home with his wife, is retired, former smoker - not smoked or had alcohol in 30 + years.       MEDICATIONS  (STANDING):  aspirin  chewable 81 milliGRAM(s) Oral daily  atorvastatin 40 milliGRAM(s) Oral at bedtime  clopidogrel Tablet 75 milliGRAM(s) Oral daily         Allergies  penicillin (Unknown)      ROS: Pertinent positives in HPI, all other ROS were reviewed and are negative.        Vital Signs Last 24 Hrs  T(C): 36.6 (08 Mar 2021 07:35), Max: 36.7 (08 Mar 2021 04:22)  T(F): 97.8 (08 Mar 2021 07:35), Max: 98.1 (08 Mar 2021 04:22)  HR: 85 (08 Mar 2021 07:35) (61 - 85)  BP: 159/70 (08 Mar 2021 07:35) (150/65 - 159/70)  BP(mean): --  RR: 18 (08 Mar 2021 07:35) (17 - 18)  SpO2: 100% (08 Mar 2021 07:35) (95% - 100%)      Gen exam:  Normocephalic, in no distress, awake and alert.  HEENT: PERRLA, EOMI,   Neck: Supple.  Respiratory: Breath sounds are clear bilaterally  Cardiovascular: S1 and S2, regular  Extremities:  no edema  Vascular: Caritid Bruit - no  Musculoskeletal: Joint / osteophytic deformities of fingers / hands  Skin: No rashes    Neurological exam:  HF: A x O x 3. inter-active, Dysarthria 2+, No Aphasia . Naming /repetition intact   CN: ROSSANA, EOMI, VFF, facial sensation normal, no NLFD, tongue midline, Palate moves equally, SCM equal bilaterally  Motor: No pronator drift, Strength 5/5 in all 4 ext, normal bulk and tone, no tremor.    Sens: Intact to light touch    Reflexes: Symmetric and normal  AJ 0, downgoing toes b/l  Coord:  No FNFA, dysmetria right +   Gait/Balance: Mild antalgic gait / uses cane    NIHSS: 2          Labs:   03-08    142  |  111<H>  |  14  ----------------------------<  142<H>  4.1   |  25  |  1.06    Ca    9.4      08 Mar 2021 09:27    TPro  7.3  /  Alb  3.5  /  TBili  0.7  /  DBili  x   /  AST  15  /  ALT  21  /  AlkPhos  111  03-07                              14.8   5.38  )-----------( 193      ( 08 Mar 2021 09:27 )             43.5       Radiology:  - MRI brain: < from: MR Head No Cont (03.08.21 @ 11:10) >  IMPRESSION:   tiny punctate acute lacunar infarction in the LEFT globus pallidus with restricted diffusion. There is mild to moderate periventricular and deep white matter ischemia with old cortical infarction RIGHT parietal lobe old lacunar infarctions in the LEFT caudate nucleus, RIGHT thalamus and RIGHT frontal white matter.      < from: CT Head No Cont (03.08.21 @ 00:19) >    IMPRESSION:    No intracranial hemorrhage, mass effect or large acute cortical infarct.

## 2021-03-08 NOTE — SWALLOW BEDSIDE ASSESSMENT ADULT - COMMENTS
The pt was admitted to  with slurred speech. Imaging of brain in hospital notable for tiny punctate lacunar infarct in left Globus Pallidus with restricted diffusion. This profile is superimposed upon a h/o RA, diverticulosis, prior appy, and past hernia repair. The pt was admitted to  with slurred speech. Imaging of brain in hospital notable for tiny punctate lacunar infarcts in left Globus Pallidus region with restricted diffusion. This profile is superimposed upon a h/o RA, diverticulosis, prior appy, and past hernia repair.

## 2021-03-08 NOTE — H&P ADULT - HISTORY OF PRESENT ILLNESS
85 YO M with a PMH of RA, Diverticulitis, presents to hospital for Slurred Speech that began on 3/7/21 at 10 AM when he woke up. Patient states that he has never had similar symptoms and that he is generally healthy. Patient denies headache, fever, chills, vomiting, or weakness in any extremity. He states that his family have noticed the slurred speech that has not disappeared. He denies any trouble swallowing, loss of appetite, weight loss and states that his dentures have no changes. He has not fallen, but states that he has been nauseous intermittently for the past few weeks. Per wife, he is able to perform his activities of daily living without any issues.

## 2021-03-08 NOTE — SWALLOW BEDSIDE ASSESSMENT ADULT - ADDITIONAL RECOMMENDATIONS
89 year old female with Hx of HTN, MI presenting to ED c/o diarrhea for past x5 days that has improved and decreased appetite. Pt denies any trauma or pain. As per daughter, patient usually eats 2 meals a day, but has not been eating. Pt was given imodium and Pedialyte. Pt had diarrhea 2x today, stool is pasty-like compared to previous diarrhea that was watery. Denies taking antibiotics. Pt denies fevers/chills, ha, loc, focal neuro deficits, cp/sob/palp, cough, abd pain/n/v. NEURO F/U 89 year old female with Hx of HTN, MI presenting to ED c/o diarrhea for past x5 days that has improved and decreased appetite. Pt denies any trauma or pain. As per daughter, patient usually eats 2 small meals a day, but has not been eating as well now eating one smaller meal a day. Pt was given imodium and Pedialyte. Pt had diarrhea 2x today, stool is pasty-like compared to previous diarrhea that was watery. Denies taking antibiotics. Pt denies fevers/chills, ha, loc, focal neuro deficits, cp/sob/palp, cough, abd pain/n/v.

## 2021-03-08 NOTE — CONSULT NOTE ADULT - ASSESSMENT
85 YO M with a PMHx of RA, Diverticulitis, presents to ER at 23.40 hrs on 3/7/21 with c/o Slurred Speech that began at 10 AM upon waking up, he felt like he had marbles in his mouth, it progressed through the day - was not associated with visual blurring, facial droop, N, V, vertigo, focal motor weakness, paresthesias or worsening gait / falls. He uses a cane x 6 months for balance - arthritis. CT head in ED - no acute lesions,     MR Head reveals tiny punctate acute lacunar infarction in LEFT globus pallidus. Mild-mod PVWM changes and old cortical infarct in R parietal, lacunar infarcts in L caudate, R thalamus and R frontal white matter    # Acute small Left GP infarct, likely lacunar - NIHSS 2, No IV tpa given because LKW > 12 hrs, NIHSS-1 by ED physician    # Numerous prior lacunar and cortical infarcts - rule out embolic source    - ASA 81 mg daily  - Atorvastatin - 40 mg daily  - MRA head and neck  - Echo    - DVT prophylaxis   -Speech and swallow eval  - PT eval/ rehab eval    Management d/w Pt / FP team

## 2021-03-08 NOTE — H&P ADULT - RS GEN PE MLT RESP DETAILS PC
normal/airway patent/breath sounds equal/good air movement/clear to auscultation bilaterally/no rales/no rhonchi/no wheezes

## 2021-03-08 NOTE — PHYSICAL THERAPY INITIAL EVALUATION ADULT - PERTINENT HX OF CURRENT PROBLEM, REHAB EVAL
87 YO M with a PMH of RA, Diverticulitis, presents to hospital for Slurred Speech that began on 3/7/21 at 10 AM when he woke up. Patient states that he has never had similar symptoms and that he is generally healthy. Patient denies headache, fever, chills, vomiting, or weakness in any extremity. He states that his family have noticed the slurred speech that has not disappeared. He denies any trouble swallowing, loss of appetite, weight loss and states that his dentures have no changes.

## 2021-03-08 NOTE — SWALLOW BEDSIDE ASSESSMENT ADULT - SWALLOW EVAL: CRITERIA FOR SKILLED INTERVENTION MET
NO NEED FOR SWALLOWING OR LANGUAGE THERAPY. AS FOR HIS MILD DYSARTHRIA, HIS SPEECH OUTPUT IS INTELLIGIBLE, DESPITE HIS SLIGHT MOTOR SPEECH DEFICITS. FURTHER, THE PT STATED THAT HE DOES NOT WANT SPEECH THERAPY, DESPITE BEING ADVISED OF THE POTENTIAL BENEFITS. AS HE IS COMMUNICATIVELY COMPETENT, DESPITE FUNCTIONAL DYSARTHRIA, THEIR IS NO NEED TO IMPOSE ST ON HIM. GIVEN ABOVE, WILL NOT ACTIVELY FOLLOW, RECONSULT PRN SHOULD STATUS CHANGE AND CONDITION WARRANT. MY CARD LEFT AT BEDSIDE IF PT HAS ANY FURTHER QUESTIONS OR CHANGES HIS MIND ABOUT SPEECH THERAPY.

## 2021-03-08 NOTE — H&P ADULT - ATTENDING COMMENTS
Patient seen and examined after initial evaluation above by family medicine resident. Case dicussed and reviewed in detail. Please note my plan below.    87 y/o M w/ PMH of RA, diverticulitis, p/w slurred speech      *Dysarthria - R/o TIA vs CVA  -CTH negative for acute findings  -MRI / MRA  -Neuro checks  -Neuro consult  -Echo  -PT consult   -speech and swallow consult  -Tele monitoring  -ASA  / Statin  -Lipid panel + A1c     *DVT ppx   -SCDs

## 2021-03-08 NOTE — H&P ADULT - NSHPSOCIALHISTORY_GEN_ALL_CORE
Patient lives at home with his wife, is able to perform his activities of daily living. He is retired, He denies drug use, smoking, and states he has alcohol socially. Patient lives at home with his wife, is able to perform his activities of daily living. He is retired, He denies drug use, and states he has not smoked or had alcohol in 30+ years. 10 pack years prior to quitting.

## 2021-03-08 NOTE — DISCHARGE NOTE NURSING/CASE MANAGEMENT/SOCIAL WORK - PATIENT PORTAL LINK FT
You can access the FollowMyHealth Patient Portal offered by [facility] by registering at the following website: [email or web address]. By joining [facility]’s FollowMyHealth portal, you will also be able to view your health information using other applications (apps) compatible with our system.

## 2021-03-08 NOTE — H&P ADULT - NSHPREVIEWOFSYSTEMS_GEN_ALL_CORE
REVIEW OF SYSTEMS:    CONSTITUTIONAL: No weakness, fevers or chills  EYES/ENT: No visual changes;  No vertigo or throat pain   NECK: No pain or stiffness  RESPIRATORY: No cough, wheezing, hemoptysis; No shortness of breath  CARDIOVASCULAR: No chest pain or palpitations  GASTROINTESTINAL: No abdominal or epigastric pain. No nausea, vomiting; No diarrhea or constipation.   GENITOURINARY: No dysuria, frequency or hematuria  NEUROLOGICAL: No numbness or weakness  SKIN: No itching, rashes REVIEW OF SYSTEMS:    CONSTITUTIONAL: No weakness, fevers or chills + nausea.   EYES/ENT: No visual changes;  No vertigo or throat pain   NECK: No pain or stiffness  RESPIRATORY: No cough, wheezing, hemoptysis; No shortness of breath  CARDIOVASCULAR: No chest pain or palpitations  GASTROINTESTINAL: No abdominal or epigastric pain. No nausea, vomiting; No diarrhea or constipation.   GENITOURINARY: No dysuria, hematuria. + Nocturia, + frequency  NEUROLOGICAL: No numbness or weakness  SKIN: No itching, rashes

## 2021-03-09 ENCOUNTER — TRANSCRIPTION ENCOUNTER (OUTPATIENT)
Age: 86
End: 2021-03-09

## 2021-03-09 PROCEDURE — 99238 HOSP IP/OBS DSCHRG MGMT 30/<: CPT | Mod: GC

## 2021-03-09 PROCEDURE — 99233 SBSQ HOSP IP/OBS HIGH 50: CPT

## 2021-03-09 RX ORDER — ASPIRIN/CALCIUM CARB/MAGNESIUM 324 MG
1 TABLET ORAL
Qty: 30 | Refills: 0
Start: 2021-03-09 | End: 2021-04-07

## 2021-03-09 RX ORDER — ASPIRIN/CALCIUM CARB/MAGNESIUM 324 MG
1 TABLET ORAL
Qty: 0 | Refills: 0 | DISCHARGE
Start: 2021-03-09

## 2021-03-09 RX ORDER — JNJ-78436735 50000000000 [PFU]/.5ML
0.5 SUSPENSION INTRAMUSCULAR ONCE
Refills: 0 | Status: COMPLETED | OUTPATIENT
Start: 2021-03-09 | End: 2021-03-09

## 2021-03-09 RX ORDER — ATORVASTATIN CALCIUM 80 MG/1
1 TABLET, FILM COATED ORAL
Qty: 0 | Refills: 0 | DISCHARGE
Start: 2021-03-09

## 2021-03-09 RX ORDER — ATORVASTATIN CALCIUM 80 MG/1
1 TABLET, FILM COATED ORAL
Qty: 30 | Refills: 0
Start: 2021-03-09 | End: 2021-04-07

## 2021-03-09 RX ADMIN — JNJ-78436735 0.5 MILLILITER(S): 50000000000 SUSPENSION INTRAMUSCULAR at 15:26

## 2021-03-09 RX ADMIN — Medication 81 MILLIGRAM(S): at 09:17

## 2021-03-09 RX ADMIN — CLOPIDOGREL BISULFATE 75 MILLIGRAM(S): 75 TABLET, FILM COATED ORAL at 09:17

## 2021-03-09 NOTE — PROGRESS NOTE ADULT - ASSESSMENT
85 y/o M with a PMH of RA and diverticulitis who presented to ED for slurred speech that began on 3/7/21 at 10 AM when he woke up. Patient states that he has never had similar symptoms and that he is generally healthy. Upon admission he denied  any trouble swallowing, loss of appetite, weight loss and states that his dentures have no changes. He has not fallen, but states that he has been nauseous intermittently for the past few weeks. Per wife, he is able to perform his activities of daily living without any issues. Currently, patient is admitted for dysarthria and to rule out CVA/TIA.     #Dysarthria   - CT Head negative for acute pathology  - NIHSS = 1  - Neuro Check Q4  - F/u MR brain and TTE   - Start Asprin 81  - Start Atorvastatin 40 mg daily   - PT Consult: initial evaluation - fall precautions, no weight bearing restrictions  - F/u neuro consult   - F/u speech and swallow consult     #HTN  - /70 this AM (3/8/21)  - No PMH of HTN  - Continue to allow for permissive HTN to at least 220/120   - DASH Diet    #Elevated Glucose  - Pending A1c in AM    #RA  - on Humera at Home    #Diet  - DASH Diet  - Patient has no trouble swallowing  - F/u speech and swallow consult     #DVT PPX  - Improve score: 1   - SCDs bilaterally 
85 YO M with a PMHx of RA, Diverticulitis, presents to ER at 23.40 hrs on 3/7/21 with c/o Slurred Speech that began at 10 AM upon waking up, he felt like he had marbles in his mouth, it progressed through the day - was not associated with visual blurring, facial droop, N, V, vertigo, focal motor weakness, paresthesias or worsening gait / falls. He uses a cane x 6 months for balance - arthritis. CT head in ED - no acute lesions,     MR Head reveals tiny punctate acute lacunar infarction in LEFT globus pallidus. Mild-mod PVWM changes and old cortical infarct in R parietal, lacunar infarcts in L caudate, R thalamus and R frontal white matter    # Acute small Left GP infarct, likely lacunar - NIHSS 2, No IV tpa given because LKW > 12 hrs, NIHSS-1 by ED physician    # Intracranial atherosclerosis; right PCA multiple stenosis - no LVO    # Numerous prior lacunar and cortical infarcts and hyperlipidemia    - ASA 81 mg daily  - Atorvastatin - 40 mg daily  - PT for balance and gait  - Speech therapy as OP      Management d/w Patient in [facility]. Stroke education provided.  D/W FP residents

## 2021-03-09 NOTE — DISCHARGE NOTE PROVIDER - NSDCMRMEDTOKEN_GEN_ALL_CORE_FT
ALPRAZolam 0.5 mg oral tablet: 1 tab(s) orally once a day, As Needed  Humira 40 mg/0.8 mL subcutaneous kit:   hydrocodone-acetaminophen 5 mg-325 mg oral tablet: 1 tab(s) orally every 8 hours, As Needed  omeprazole 20 mg oral delayed release capsule: 1 cap(s) orally once a day   ALPRAZolam 0.5 mg oral tablet: 1 tab(s) orally once a day, As Needed  aspirin 81 mg oral tablet, chewable: 1 tab(s) orally once a day  atorvastatin 40 mg oral tablet: 1 tab(s) orally once a day (at bedtime)  Humira 40 mg/0.8 mL subcutaneous kit:   omeprazole 20 mg oral delayed release capsule: 1 cap(s) orally once a day   ALPRAZolam 0.5 mg oral tablet: 1 tab(s) orally once a day, As Needed  aspirin 81 mg oral delayed release tablet: 1 tab(s) orally once a day   aspirin 81 mg oral tablet, chewable: 1 tab(s) orally once a day  atorvastatin 40 mg oral tablet: 1 tab(s) orally once a day (at bedtime)   atorvastatin 40 mg oral tablet: 1 tab(s) orally once a day (at bedtime)  Humira 40 mg/0.8 mL subcutaneous kit:   omeprazole 20 mg oral delayed release capsule: 1 cap(s) orally once a day

## 2021-03-09 NOTE — DISCHARGE NOTE PROVIDER - HOSPITAL COURSE
CT head without contrast showed microvascular ischemic disease. No intracranial hemorrhage, mass effect, or large acute cortical infarct was seen. MRI of the head without contrast showed tiny punctate acute lacunar infarction in the LEFT globus pallidus with restricted diffusion. There is mild to moderate periventricular and deep white matter ischemia with old cortical infarction RIGHT parietal lobe old lacunar infarctions in the LEFT caudate nucleus, RIGHT thalamus and RIGHT frontal white matter. MRA of the head without contrast showed focal narrowing of the P1/P2 segments of the R posterior cerebral artery. MRA of the neck without contrast showed that there was no significant arterial stenosis in the neck.      CT head without contrast showed microvascular ischemic disease. No intracranial hemorrhage, mass effect, or large acute cortical infarct was seen. MRI of the head without contrast showed tiny punctate acute lacunar infarction in the LEFT globus pallidus with restricted diffusion. There is mild to moderate periventricular and deep white matter ischemia with old cortical infarction RIGHT parietal lobe old lacunar infarctions in the LEFT caudate nucleus, RIGHT thalamus and RIGHT frontal white matter. MRA of the head without contrast showed focal narrowing of the P1/P2 segments of the R posterior cerebral artery. MRA of the neck without contrast showed that there was no significant arterial stenosis in the neck. TTE showed that mild to moderate mitral regurgitation is present and that there is an estimated left ventricular ejection fraction of 55-60%.       85 y/o M with a PMH of RA, diverticulitis who presented to hospital for slurred speech that began on 3/7/21 at 10 AM when he woke up. Patient stated that he has never had similar symptoms and that he is generally healthy. In the ED, he was found to have an NIHSS score of 1. He was admitted for dysarthria and stroke work-up was initiated. CT head without contrast showed microvascular ischemic disease. No intracranial hemorrhage, mass effect, or large acute cortical infarct was seen. MRI of the head without contrast showed tiny punctate acute lacunar infarction in the LEFT globus pallidus with restricted diffusion. There is mild to moderate periventricular and deep white matter ischemia with old cortical infarction RIGHT parietal lobe old lacunar infarctions in the LEFT caudate nucleus, RIGHT thalamus and RIGHT frontal white matter. MRA of the head without contrast showed focal narrowing of the P1/P2 segments of the R posterior cerebral artery. MRA of the neck without contrast showed that there was no significant arterial stenosis in the neck. TTE showed that mild to moderate mitral regurgitation is present and that there is an estimated left ventricular ejection fraction of 55-60%. Patient was seen by neurologist Dr. Fitzgerald. He was started on aspirin and atorvastatin. Patient will need to continue these medications upon discharge and should follow-up with neurology within 1 week. Additionally, patient was found to have an A1C of 5.7% and his lipid profile showed elevated cholesterol, TGs, and LDL. He should follow-up with his PCP within 1 week to discuss these findings.     Today is hospital day 2. Patient no longer has elevated blood pressure. No acute events overnight. He would like to go home.     Vital Signs Last 24 Hrs  T(C): 36.9 (08 Mar 2021 20:45), Max: 36.9 (08 Mar 2021 20:45)  T(F): 98.5 (08 Mar 2021 20:45), Max: 98.5 (08 Mar 2021 20:45)  HR: 77 (08 Mar 2021 20:45) (77 - 77)  BP: 129/63 (08 Mar 2021 20:45) (129/63 - 129/63)  RR: 18 (08 Mar 2021 20:45) (18 - 18)  SpO2: 99% (08 Mar 2021 20:45) (99% - 99%)    PHYSICAL EXAM:  GENERAL: NAD   HEAD:  Atraumatic, Normocephalic  EYES: EOMI, conjunctiva clear  and sclera white   NECK: Supple, No JVD  CHEST/LUNG: Clear to auscultation bilaterally; No rales, rhonchi, wheezing, or rubs. Unlabored respirations.  HEART: Regular rate and rhythm; No murmurs, rubs, or gallops  ABDOMEN: Bowel sounds present; Soft, Nontender, Nondistended.  EXTREMITIES:  No clubbing, cyanosis, or edema  NERVOUS SYSTEM:  Alert & Oriented X3, speech clear.   MSK: FROM all 4 extremities, full and equal strength      LABS:                        14.8   5.38  )-----------( 193      ( 08 Mar 2021 09:27 )             43.5     03-08    142  |  111<H>  |  14  ----------------------------<  142<H>  4.1   |  25  |  1.06    Ca    9.4      08 Mar 2021 09:27    TPro  7.3  /  Alb  3.5  /  TBili  0.7  /  DBili  x   /  AST  15  /  ALT  21  /  AlkPhos  111  03-07    PT/INR - ( 07 Mar 2021 23:50 )   PT: 12.2 sec;   INR: 1.04 ratio       PTT - ( 07 Mar 2021 23:50 )  PTT:29.8 sec                      85 y/o M with a PMH of RA, diverticulitis who presented to hospital for slurred speech that began on 3/7/21 at 10 AM when he woke up. Patient stated that he has never had similar symptoms and that he is generally healthy. In the ED, he was found to have an NIHSS score of 1. He was admitted for dysarthria and stroke work-up was initiated. CT head without contrast showed microvascular ischemic disease. No intracranial hemorrhage, mass effect, or large acute cortical infarct was seen. MRI of the head without contrast showed tiny punctate acute lacunar infarction in the LEFT globus pallidus with restricted diffusion. There is mild to moderate periventricular and deep white matter ischemia with old cortical infarction RIGHT parietal lobe old lacunar infarctions in the LEFT caudate nucleus, RIGHT thalamus and RIGHT frontal white matter. MRA of the head without contrast showed focal narrowing of the P1/P2 segments of the R posterior cerebral artery. MRA of the neck without contrast showed that there was no significant arterial stenosis in the neck. TTE showed that mild to moderate mitral regurgitation is present and that there is an estimated left ventricular ejection fraction of 55-60%. Patient was seen by neurologist Dr. Fitzgerald. He was started on aspirin and atorvastatin. Patient will need to continue these medications upon discharge and should follow-up with neurology within 1 week. Additionally, patient was found to have an A1C of 5.7% and his lipid profile showed elevated cholesterol, TGs, and LDL. He should follow-up with his PCP within 1 week to discuss these findings.     Today is hospital day 2. Patient no longer has elevated blood pressure but continues to have some slurred speech.  No acute events overnight. He would like to go home.     Vital Signs Last 24 Hrs  T(C): 36.9 (08 Mar 2021 20:45), Max: 36.9 (08 Mar 2021 20:45)  T(F): 98.5 (08 Mar 2021 20:45), Max: 98.5 (08 Mar 2021 20:45)  HR: 77 (08 Mar 2021 20:45) (77 - 77)  BP: 129/63 (08 Mar 2021 20:45) (129/63 - 129/63)  RR: 18 (08 Mar 2021 20:45) (18 - 18)  SpO2: 99% (08 Mar 2021 20:45) (99% - 99%)    PHYSICAL EXAM:  GENERAL: NAD   HEAD:  Atraumatic, Normocephalic  EYES: EOMI, conjunctiva clear  and sclera white   NECK: Supple, No JVD  CHEST/LUNG: Clear to auscultation bilaterally; No rales, rhonchi, wheezing, or rubs. Unlabored respirations.  HEART: Regular rate and rhythm; No murmurs, rubs, or gallops  ABDOMEN: Bowel sounds present; Soft, Nontender, Nondistended.  EXTREMITIES:  No clubbing, cyanosis, or edema  NERVOUS SYSTEM:  Alert & Oriented X3, speech clear.   MSK: FROM all 4 extremities, full and equal strength      LABS:                        14.8   5.38  )-----------( 193      ( 08 Mar 2021 09:27 )             43.5     03-08    142  |  111<H>  |  14  ----------------------------<  142<H>  4.1   |  25  |  1.06    Ca    9.4      08 Mar 2021 09:27    TPro  7.3  /  Alb  3.5  /  TBili  0.7  /  DBili  x   /  AST  15  /  ALT  21  /  AlkPhos  111  03-07    PT/INR - ( 07 Mar 2021 23:50 )   PT: 12.2 sec;   INR: 1.04 ratio       PTT - ( 07 Mar 2021 23:50 )  PTT:29.8 sec

## 2021-03-09 NOTE — DISCHARGE NOTE PROVIDER - CARE PROVIDER_API CALL
Tyron Sandoval)  Neurology  [address] [facility], [address]  [place] [address]  Phone: [phone]  Fax: [phone]  Established Patient  Follow Up Time: 1 week    Julius Conklin  INTERNAL MEDICINE  [address] [place] [address]  Phone: [phone]  Fax: [phone]  Established Patient  Follow Up Time: 1 week

## 2021-03-09 NOTE — DISCHARGE NOTE PROVIDER - NSDCQMMRS_NEU_ALL_CORE
0 - No symptoms 1 - No significant disability. Able to carry out all usual activities, despite some symptoms

## 2021-03-09 NOTE — DISCHARGE NOTE PROVIDER - NSDCCPCAREPLAN_GEN_ALL_CORE_FT
PRINCIPAL DISCHARGE DIAGNOSIS  Diagnosis: Stroke  Assessment and Plan of Treatment: You were brought to the hospital for slurred speech. Further work-up which included a CT scan and MRI of the brain revealed that you had an acute stroke and a few old strokes in various regions of your brain. Imaging of the vessels in your brain and neck showed that one vessel was stenotic/narrowed (right posterior cerebral artery). You were seen by a neurologist during this visit and were started on two new medications called ASPIRIN and ATORVASTATIN. You will need to continue to take these medications. Please take ASPIRIN 81 mg daily and ATORVASTATIN 40 mg daily at bedtime. Please follow-up with the neurologist Dr. Fitzgerald within 1 week. Additionally, you were found to have a hemoglobin A1C level of 5.7% which is consistent with prediabetes. You were also found to have elevated cholesterol and triglyceride levels. Please discuss these findings with your PCP within 1 week. If you experience any new or worsening symtoms return to the ED immediately.      SECONDARY DISCHARGE DIAGNOSES  Diagnosis: Rheumatoid arthritis  Assessment and Plan of Treatment: Continue Humera.

## 2021-03-09 NOTE — PHARMACOTHERAPY INTERVENTION NOTE - COMMENTS
Provided discharge counseling to patient for new medications of atorvastatin and aspirin. Discussed patient's eligibility for COVID-19 vaccine and facilitated vaccine administration. Answered all of patient's medication-related questions.
Recommended Vte prophylaxis
Confirmed home medications with patient's wife and Dr. [facility]. Updated patient allergies and preferred outpatient pharmacy and documented in Outpatient Medication Review.

## 2021-03-09 NOTE — PROGRESS NOTE ADULT - SUBJECTIVE AND OBJECTIVE BOX
Pt continues to have slurred speech, no other complaints, no worsening of balance/ gait    MRI brain showed acute lacunar infarction in LEFT globus pallidus. Mild-mod PVWM changes and old cortical infarct in R parietal, lacunar infarcts in L caudate, R thalamus and R frontal white matter  MRA brain Intracranial atherosclerosis; right PCA multiple stenosis - no LVO  MRA nech no significant stenosis    ROS: As above, other ROS Negative    MEDICATIONS  (STANDING):  aspirin  chewable 81 milliGRAM(s) Oral daily  atorvastatin 40 milliGRAM(s) Oral at bedtime  coronavirus (EUA) Vaccine (daPulse) 0.5 milliLiter(s) IntraMuscular once  enoxaparin Injectable 40 milliGRAM(s) SubCutaneous at bedtime    Vital Signs Last 24 Hrs  T(C): 36.9 (08 Mar 2021 20:45), Max: 36.9 (08 Mar 2021 20:45)  T(F): 98.5 (08 Mar 2021 20:45), Max: 98.5 (08 Mar 2021 20:45)  HR: 77 (08 Mar 2021 20:45) (77 - 77)  BP: 129/63 (08 Mar 2021 20:45) (129/63 - 129/63)  BP(mean): --  RR: 18 (08 Mar 2021 20:45) (18 - 18)  SpO2: 99% (08 Mar 2021 20:45) (99% - 99%)    Gen exam:  Normocephalic, in no distress, awake and alert.  HEENT: PERRLA, EOMI,   Neck: Supple.  Musculoskeletal: Joint / osteophytic deformities of fingers / hands  Skin: No rashes       Neurological exam:  HF: A x O x 3. inter-active, Dysarthria 2+, No Aphasia . Naming /repetition intact   CN: ROSSANA, EOMI, VFF, facial sensation normal, no NLFD, tongue midline, Palate moves equally, SCM equal bilaterally  Motor: No pronator drift, Strength 5/5 in all 4 ext, normal bulk and tone, no tremor.    Sens: Intact to light touch    Reflexes: Symmetric and normal  AJ 0, downgoing toes b/l  Coord:  No FNFA, dysmetria right +   Gait/Balance: Mild antalgic gait / uses cane    NIHSS: 2    Labs:                     14.8   5.38  )-----------( 193      ( 08 Mar 2021 09:27 )             43.5     03-08    142  |  111<H>  |  14  ----------------------------<  142<H>  4.1   |  25  |  1.06    Ca    9.4      08 Mar 2021 09:27    TPro  7.3  /  Alb  3.5  /  TBili  0.7  /  DBili  x   /  AST  15  /  ALT  21  /  AlkPhos  111  03-07      03-08 Chol 200<H> LDL -- HDL 44 Trig 163<H>    Radiology report:   < from: MR Head No Cont (03.08.21 @ 11:10) >  IMPRESSION:   tiny punctate acute lacunar infarction in the LEFT globus pallidus with restricted diffusion. There is mild to moderate periventricular and deep white matter ischemia with old cortical infarction RIGHT parietal lobe old lacunar infarctions in the LEFT caudate nucleus, RIGHT thalamus and RIGHT frontal white matter.    < from: MR Angio Neck No Cont (03.08.21 @ 16:39) >  IMPRESSION:  There is no significant arterial stenosis in the neck.    < from: MR Angio Head No Cont (03.08.21 @ 16:38) >  IMPRESSION:  There is a focal moderate narrowing of the P1/P2 segments of the right PCA. No other high-grade narrowing or occlusion is seen in the intracranial circulation.  No dominant aneurysm or arteriovenous malformation.    Echo: showed that mild to moderate mitral regurgitation, estimated left ventricular ejection fraction of 55-60%. 
S:  87 y/o M with a PMH of RA and diverticulitis who presented to ED for slurred speech that began on 3/7/21 at 10 AM when he woke up. Patient states that he has never had similar symptoms and that he is generally healthy. Upon admission he denied  any trouble swallowing, loss of appetite, weight loss and states that his dentures have no changes. He has not fallen, but states that he has been nauseous intermittently for the past few weeks. Per wife, he is able to perform his activities of daily living without any issues. Currently, patient is admitted for dysarthria and to rule out CVA/TIA.     Today is hospital day 1. Patient gone for echocardiogram. No acute issues overnight.     O:  Vital Signs Last 24 Hrs  T(C): 36.6 (08 Mar 2021 07:35), Max: 36.7 (08 Mar 2021 04:22)  T(F): 97.8 (08 Mar 2021 07:35), Max: 98.1 (08 Mar 2021 04:22)  HR: 85 (08 Mar 2021 07:35) (61 - 85)  BP: 159/70 (08 Mar 2021 07:35) (150/65 - 159/70)  RR: 18 (08 Mar 2021 07:35) (17 - 18)  SpO2: 100% (08 Mar 2021 07:35) (95% - 100%)    PHYSICAL EXAM:  GENERAL: NAD   HEAD:  Atraumatic, Normocephalic  EYES: EOMI, conjunctiva clear  and sclera white   NECK: Supple, No JVD  CHEST/LUNG: Clear to auscultation bilaterally; No rales, rhonchi, wheezing, or rubs. Unlabored respirations.  HEART: Regular rate and rhythm; No murmurs, rubs, or gallops  ABDOMEN: Bowel sounds present; Soft, Nontender, Nondistended.  EXTREMITIES:  No clubbing, cyanosis, or edema  NERVOUS SYSTEM:  Alert & Oriented X3, speech clear. No deficits   MSK: FROM all 4 extremities, full and equal strength      LABS:                        14.4   6.43  )-----------( 201      ( 07 Mar 2021 23:50 )             42.9     03-07    143  |  112<H>  |  15  ----------------------------<  140<H>  4.1   |  26  |  1.06    Ca    9.0      07 Mar 2021 23:50    TPro  7.3  /  Alb  3.5  /  TBili  0.7  /  DBili  x   /  AST  15  /  ALT  21  /  AlkPhos  111  03-07  PT/INR - ( 07 Mar 2021 23:50 )   PT: 12.2 sec;   INR: 1.04 ratio    PTT - ( 07 Mar 2021 23:50 )  PTT:29.8 sec    IMAGING:  EXAM:  CT BRAIN                        PROCEDURE DATE:  03/08/2021    Comparison:  CT of September 11, 2017  FINDINGS:    There is no intracranial hemorrhage, abnormal extra-axial fluid collection, mass effect, midline shift or large acute cortical infarct.    Gray-white differentiation is maintained.  There are ageappropriate involutional changes with commensurate dilatation of the CSF spaces.  There are subcortical and periventricular white matter lucencies likely representing microvascular ischemic disease.    The mastoid air cells and visualized paranasal sinuses are well aerated.    IMPRESSION:    No intracranial hemorrhage, mass effect or large acute cortical infarct.    MEDICATIONS  (STANDING):  aspirin  chewable 81 milliGRAM(s) Oral daily  atorvastatin 40 milliGRAM(s) Oral at bedtime    MEDICATIONS  (PRN):  acetaminophen   Tablet .. 650 milliGRAM(s) Oral every 6 hours PRN Mild Pain (1 - 3)  ondansetron Injectable 4 milliGRAM(s) IV Push every 6 hours PRN Nausea and/or Vomiting

## 2021-03-09 NOTE — DISCHARGE NOTE PROVIDER - PROVIDER TOKENS
PROVIDER:[TOKEN:[[id number]],FOLLOWUP:[1 week],ESTABLISHEDPATIENT:[T]],PROVIDER:[TOKEN:[[id number]],FOLLOWUP:[1 week],ESTABLISHEDPATIENT:[T]]

## 2021-03-16 DIAGNOSIS — E78.5 HYPERLIPIDEMIA, UNSPECIFIED: ICD-10-CM

## 2021-03-16 DIAGNOSIS — I63.9 CEREBRAL INFARCTION, UNSPECIFIED: ICD-10-CM

## 2021-03-16 DIAGNOSIS — M06.9 RHEUMATOID ARTHRITIS, UNSPECIFIED: ICD-10-CM

## 2021-03-16 DIAGNOSIS — I10 ESSENTIAL (PRIMARY) HYPERTENSION: ICD-10-CM

## 2021-03-16 DIAGNOSIS — R73.9 HYPERGLYCEMIA, UNSPECIFIED: ICD-10-CM

## 2021-03-22 ENCOUNTER — APPOINTMENT (OUTPATIENT)
Dept: NEUROLOGY | Facility: CLINIC | Age: 86
End: 2021-03-22
Payer: MEDICARE

## 2021-03-22 VITALS
TEMPERATURE: 98 F | HEIGHT: 72 IN | WEIGHT: 185 LBS | DIASTOLIC BLOOD PRESSURE: 77 MMHG | HEART RATE: 71 BPM | BODY MASS INDEX: 25.06 KG/M2 | SYSTOLIC BLOOD PRESSURE: 117 MMHG

## 2021-03-22 DIAGNOSIS — I66.21: ICD-10-CM

## 2021-03-22 DIAGNOSIS — I63.81 OTHER CEREBRAL INFARCTION DUE TO OCCLUSION OR STENOSIS OF SMALL ARTERY: ICD-10-CM

## 2021-03-22 DIAGNOSIS — Z86.73 PERSONAL HISTORY OF TRANSIENT ISCHEMIC ATTACK (TIA), AND CEREBRAL INFARCTION W/OUT RESIDUAL DEFICITS: ICD-10-CM

## 2021-03-22 DIAGNOSIS — Z80.9 FAMILY HISTORY OF MALIGNANT NEOPLASM, UNSPECIFIED: ICD-10-CM

## 2021-03-22 DIAGNOSIS — Z82.49 FAMILY HISTORY OF ISCHEMIC HEART DISEASE AND OTHER DISEASES OF THE CIRCULATORY SYSTEM: ICD-10-CM

## 2021-03-22 DIAGNOSIS — R26.81 UNSTEADINESS ON FEET: ICD-10-CM

## 2021-03-22 PROCEDURE — 99072 ADDL SUPL MATRL&STAF TM PHE: CPT

## 2021-03-22 PROCEDURE — 99495 TRANSJ CARE MGMT MOD F2F 14D: CPT

## 2021-03-22 RX ORDER — ATORVASTATIN CALCIUM 40 MG/1
40 TABLET, FILM COATED ORAL
Qty: 30 | Refills: 0 | Status: ACTIVE | COMMUNITY
Start: 2021-03-09

## 2021-03-22 RX ORDER — ALPRAZOLAM 0.5 MG/1
0.5 TABLET ORAL
Qty: 30 | Refills: 0 | Status: ACTIVE | COMMUNITY
Start: 2021-02-24

## 2021-03-22 RX ORDER — OMEPRAZOLE 20 MG/1
20 TABLET, DELAYED RELEASE ORAL
Refills: 0 | Status: ACTIVE | COMMUNITY

## 2021-03-22 RX ORDER — ASPIRIN 81 MG
81 TABLET, DELAYED RELEASE (ENTERIC COATED) ORAL
Refills: 0 | Status: ACTIVE | COMMUNITY

## 2021-03-22 RX ORDER — ADALIMUMAB 40MG/0.4ML
40 KIT SUBCUTANEOUS
Refills: 0 | Status: ACTIVE | COMMUNITY

## 2021-03-22 NOTE — DATA REVIEWED
[de-identified] : 3/8/21: MR brain: acute lacunar infarction in the left globus pallidus. Old left GP, left CN, right thalamus and right frontal lobe infarcts.\par MRA head: multiple stenoses of right PCA\par MRA neck: No significant stenosis [de-identified] : 3/8/21:CT head unremarkable

## 2021-03-22 NOTE — HISTORY OF PRESENT ILLNESS
[FreeTextEntry1] : 86-year-old male with PMHX of RA, diverticulitis, was admitted to [facility] on 3/8/21 with complaints of slurred speech that progressed throughout the day, not associated with other neurological symptoms, off note, he had noted worsening gait and balance that had resulted in several falls over 6 months. CT scan head done in the ED showed no acute lesions, subsequently MRI of the brain revealed a tiny acute lacunar infarction in the left globus pallidus, in addition multiple other chronic lacunar infarcts were noted in both cerebral hemispheres. MRA of the brain revealed multiple stenosis in the right PCA .Patient was started on ASA and atorvastatin. Rest of stroke workup was unremarkable. Patient was discharged home, he has been receiving home PT in addition to one session of speech therapy.\par \par Patient reports he continues to have mild gait instability, he reports his speech has improved remarkably.

## 2021-03-22 NOTE — DISCUSSION/SUMMARY
[FreeTextEntry1] : 86-year-old male with PMHX of RA, admitted to  on 3/8/21 with complaints of slurred speech that progressed throughout the day, has had worsening gait/balance that had resulted in several falls over 6 months. CT head done - no acute lesions, MRI brain + tiny acute lacunar infarction in the left globus pallidus, in addition, multiple other chronic lacunar infarcts bilaterally. MRA brain ~ multiple stenosis in the right PCA \par \par # Acute left globus pallidus lacunar infarction \par \par # Unstable gait, multiple prior lacunar infarcts bilaterally \par \par # Right PCA stenosis \par \par - I have recommended continuing speech therapy, start physical therapy for balance and gait training as outpatient \par - Continue aspirin 81 mg daily in addition to atorvastatin 40 mg daily \par - Stroke education provided \par - Patient cautioned regarding falls

## 2021-03-22 NOTE — REASON FOR VISIT
[Post Hospitalization] : a post hospitalization visit [Spouse] : spouse [FreeTextEntry1] : for stroke

## 2021-03-22 NOTE — REVIEW OF SYSTEMS
[Negative] : Heme/Lymph [Poor Coordination] : poor coordination [Difficulties in Speech] : speech difficulties [Difficulty Walking] : difficulty walking

## 2021-03-22 NOTE — PHYSICAL EXAM
[General Appearance - Alert] : alert [General Appearance - In No Acute Distress] : in no acute distress [Mood] : the mood was normal [Person] : oriented to person [Place] : oriented to place [Time] : oriented to time [Concentration Intact] : normal concentrating ability [Naming Objects] : no difficulty naming common objects [Repeating Phrases] : no difficulty repeating a phrase [Fluency] : fluency intact [Comprehension] : comprehension intact [Dysarthria] : dysarthria [Past History] : adequate knowledge of personal past history [Cranial Nerves Optic (II)] : visual acuity intact bilaterally,  visual fields full to confrontation, pupils equal round and reactive to light [Cranial Nerves Oculomotor (III)] : extraocular motion intact [Cranial Nerves Trigeminal (V)] : facial sensation intact symmetrically [Cranial Nerves Facial (VII)] : face symmetrical [Cranial Nerves Vestibulocochlear (VIII)] : hearing was intact bilaterally [Cranial Nerves Glossopharyngeal (IX)] : tongue and palate midline [Cranial Nerves Accessory (XI - Cranial And Spinal)] : head turning and shoulder shrug symmetric [Cranial Nerves Hypoglossal (XII)] : there was no tongue deviation with protrusion [Motor Tone] : muscle tone was normal in all four extremities [Motor Strength] : muscle strength was normal in all four extremities [Sensation Tactile Decrease] : light touch was intact [Past-pointing] : there was no past-pointing [Tremor] : no tremor present [Coordination - Dysmetria Impaired Finger-to-Nose Bilateral] : not present [2+] : Brachioradialis left 2+ [1+] : Patella left 1+ [0] : Ankle jerk left 0 [Plantar Reflex Right Only] : normal on the right [Plantar Reflex Left Only] : normal on the left [FreeTextEntry6] : no Rigidity, no bradykinesia [FreeTextEntry8] : Gait/balance: mildly unstable, small shuffling steps. [PERRL With Normal Accommodation] : pupils were equal in size, round, reactive to light, with normal accommodation [Full Visual Field] : full visual field [Hearing Threshold Finger Rub Not [place]] : hearing was normal [Neck Cervical Mass (___cm)] : no neck mass was observed [Heart Sounds] : normal S1 and S2 [Arterial Pulses Carotid] : carotid pulses were normal with no bruits [Edema] : there was no peripheral edema [FreeTextEntry1] : arthritis small joints of hands with osteophyte [] : no rash

## 2021-05-13 ENCOUNTER — INPATIENT (INPATIENT)
Facility: HOSPITAL | Age: 86
LOS: 4 days | Discharge: HOME HEALTH | DRG: 42 | End: 2021-05-18
Attending: HOSPITALIST | Admitting: INTERNAL MEDICINE
Payer: MEDICARE

## 2021-05-13 VITALS
HEIGHT: 74 IN | DIASTOLIC BLOOD PRESSURE: 75 MMHG | WEIGHT: 190.04 LBS | OXYGEN SATURATION: 97 % | TEMPERATURE: 98 F | HEART RATE: 70 BPM | RESPIRATION RATE: 15 BRPM | SYSTOLIC BLOOD PRESSURE: 163 MMHG

## 2021-05-13 DIAGNOSIS — Z90.49 ACQUIRED ABSENCE OF OTHER SPECIFIED PARTS OF DIGESTIVE TRACT: Chronic | ICD-10-CM

## 2021-05-13 DIAGNOSIS — Z98.890 OTHER SPECIFIED POSTPROCEDURAL STATES: Chronic | ICD-10-CM

## 2021-05-13 DIAGNOSIS — R47.01 APHASIA: ICD-10-CM

## 2021-05-13 LAB
ALBUMIN SERPL ELPH-MCNC: 3.4 G/DL — SIGNIFICANT CHANGE UP (ref 3.3–5)
ALP SERPL-CCNC: 107 U/L — SIGNIFICANT CHANGE UP (ref 40–120)
ALT FLD-CCNC: 21 U/L — SIGNIFICANT CHANGE UP (ref 12–78)
ANION GAP SERPL CALC-SCNC: 2 MMOL/L — LOW (ref 5–17)
APTT BLD: 29.9 SEC — SIGNIFICANT CHANGE UP (ref 27.5–35.5)
AST SERPL-CCNC: 13 U/L — LOW (ref 15–37)
BASE EXCESS BLDV CALC-SCNC: 0.4 MMOL/L — SIGNIFICANT CHANGE UP (ref -2–2)
BASOPHILS # BLD AUTO: 0.01 K/UL — SIGNIFICANT CHANGE UP (ref 0–0.2)
BASOPHILS NFR BLD AUTO: 0.1 % — SIGNIFICANT CHANGE UP (ref 0–2)
BILIRUB SERPL-MCNC: 0.9 MG/DL — SIGNIFICANT CHANGE UP (ref 0.2–1.2)
BUN SERPL-MCNC: 15 MG/DL — SIGNIFICANT CHANGE UP (ref 7–23)
CALCIUM SERPL-MCNC: 9.4 MG/DL — SIGNIFICANT CHANGE UP (ref 8.5–10.1)
CHLORIDE SERPL-SCNC: 111 MMOL/L — HIGH (ref 96–108)
CO2 SERPL-SCNC: 27 MMOL/L — SIGNIFICANT CHANGE UP (ref 22–31)
CREAT SERPL-MCNC: 0.93 MG/DL — SIGNIFICANT CHANGE UP (ref 0.5–1.3)
EOSINOPHIL # BLD AUTO: 0.25 K/UL — SIGNIFICANT CHANGE UP (ref 0–0.5)
EOSINOPHIL NFR BLD AUTO: 3.6 % — SIGNIFICANT CHANGE UP (ref 0–6)
GLUCOSE SERPL-MCNC: 109 MG/DL — HIGH (ref 70–99)
HCO3 BLDV-SCNC: 24 MMOL/L — SIGNIFICANT CHANGE UP (ref 21–29)
HCT VFR BLD CALC: 40.8 % — SIGNIFICANT CHANGE UP (ref 39–50)
HGB BLD-MCNC: 13.7 G/DL — SIGNIFICANT CHANGE UP (ref 13–17)
IMM GRANULOCYTES NFR BLD AUTO: 0.1 % — SIGNIFICANT CHANGE UP (ref 0–1.5)
INR BLD: 1.03 RATIO — SIGNIFICANT CHANGE UP (ref 0.88–1.16)
LYMPHOCYTES # BLD AUTO: 2.63 K/UL — SIGNIFICANT CHANGE UP (ref 1–3.3)
LYMPHOCYTES # BLD AUTO: 37.5 % — SIGNIFICANT CHANGE UP (ref 13–44)
MCHC RBC-ENTMCNC: 30.3 PG — SIGNIFICANT CHANGE UP (ref 27–34)
MCHC RBC-ENTMCNC: 33.6 GM/DL — SIGNIFICANT CHANGE UP (ref 32–36)
MCV RBC AUTO: 90.3 FL — SIGNIFICANT CHANGE UP (ref 80–100)
MONOCYTES # BLD AUTO: 0.63 K/UL — SIGNIFICANT CHANGE UP (ref 0–0.9)
MONOCYTES NFR BLD AUTO: 9 % — SIGNIFICANT CHANGE UP (ref 2–14)
NEUTROPHILS # BLD AUTO: 3.49 K/UL — SIGNIFICANT CHANGE UP (ref 1.8–7.4)
NEUTROPHILS NFR BLD AUTO: 49.7 % — SIGNIFICANT CHANGE UP (ref 43–77)
PCO2 BLDV: 38 MMHG — SIGNIFICANT CHANGE UP (ref 35–50)
PH BLDV: 7.42 — SIGNIFICANT CHANGE UP (ref 7.35–7.45)
PLATELET # BLD AUTO: 186 K/UL — SIGNIFICANT CHANGE UP (ref 150–400)
PO2 BLDV: 136 MMHG — HIGH (ref 25–45)
POTASSIUM SERPL-MCNC: 4 MMOL/L — SIGNIFICANT CHANGE UP (ref 3.5–5.3)
POTASSIUM SERPL-SCNC: 4 MMOL/L — SIGNIFICANT CHANGE UP (ref 3.5–5.3)
PROT SERPL-MCNC: 6.8 GM/DL — SIGNIFICANT CHANGE UP (ref 6–8.3)
PROTHROM AB SERPL-ACNC: 12 SEC — SIGNIFICANT CHANGE UP (ref 10.6–13.6)
RBC # BLD: 4.52 M/UL — SIGNIFICANT CHANGE UP (ref 4.2–5.8)
RBC # FLD: 13.1 % — SIGNIFICANT CHANGE UP (ref 10.3–14.5)
SAO2 % BLDV: 99 % — HIGH (ref 67–88)
SARS-COV-2 RNA SPEC QL NAA+PROBE: SIGNIFICANT CHANGE UP
SODIUM SERPL-SCNC: 140 MMOL/L — SIGNIFICANT CHANGE UP (ref 135–145)
TROPONIN I SERPL-MCNC: <0.015 NG/ML — SIGNIFICANT CHANGE UP (ref 0.01–0.04)
WBC # BLD: 7.02 K/UL — SIGNIFICANT CHANGE UP (ref 3.8–10.5)
WBC # FLD AUTO: 7.02 K/UL — SIGNIFICANT CHANGE UP (ref 3.8–10.5)

## 2021-05-13 PROCEDURE — 85027 COMPLETE CBC AUTOMATED: CPT

## 2021-05-13 PROCEDURE — C1764: CPT

## 2021-05-13 PROCEDURE — 92610 EVALUATE SWALLOWING FUNCTION: CPT | Mod: GN

## 2021-05-13 PROCEDURE — 93312 ECHO TRANSESOPHAGEAL: CPT

## 2021-05-13 PROCEDURE — 93325 DOPPLER ECHO COLOR FLOW MAPG: CPT

## 2021-05-13 PROCEDURE — 99223 1ST HOSP IP/OBS HIGH 75: CPT

## 2021-05-13 PROCEDURE — 70551 MRI BRAIN STEM W/O DYE: CPT

## 2021-05-13 PROCEDURE — 97116 GAIT TRAINING THERAPY: CPT | Mod: GP

## 2021-05-13 PROCEDURE — 92523 SPEECH SOUND LANG COMPREHEN: CPT | Mod: GN

## 2021-05-13 PROCEDURE — 80048 BASIC METABOLIC PNL TOTAL CA: CPT

## 2021-05-13 PROCEDURE — 92526 ORAL FUNCTION THERAPY: CPT | Mod: GN

## 2021-05-13 PROCEDURE — 93010 ELECTROCARDIOGRAM REPORT: CPT

## 2021-05-13 PROCEDURE — 83735 ASSAY OF MAGNESIUM: CPT

## 2021-05-13 PROCEDURE — 70498 CT ANGIOGRAPHY NECK: CPT | Mod: 26,MA

## 2021-05-13 PROCEDURE — 92507 TX SP LANG VOICE COMM INDIV: CPT | Mod: GN

## 2021-05-13 PROCEDURE — 0042T: CPT

## 2021-05-13 PROCEDURE — C9113: CPT

## 2021-05-13 PROCEDURE — 82803 BLOOD GASES ANY COMBINATION: CPT

## 2021-05-13 PROCEDURE — 85652 RBC SED RATE AUTOMATED: CPT

## 2021-05-13 PROCEDURE — 99285 EMERGENCY DEPT VISIT HI MDM: CPT

## 2021-05-13 PROCEDURE — 81001 URINALYSIS AUTO W/SCOPE: CPT

## 2021-05-13 PROCEDURE — 36415 COLL VENOUS BLD VENIPUNCTURE: CPT

## 2021-05-13 PROCEDURE — U0005: CPT

## 2021-05-13 PROCEDURE — 33285 INSJ SUBQ CAR RHYTHM MNTR: CPT

## 2021-05-13 PROCEDURE — 86769 SARS-COV-2 COVID-19 ANTIBODY: CPT

## 2021-05-13 PROCEDURE — 97530 THERAPEUTIC ACTIVITIES: CPT | Mod: GP

## 2021-05-13 PROCEDURE — 70496 CT ANGIOGRAPHY HEAD: CPT | Mod: 26,MA

## 2021-05-13 PROCEDURE — 80061 LIPID PANEL: CPT

## 2021-05-13 PROCEDURE — U0003: CPT

## 2021-05-13 PROCEDURE — 97162 PT EVAL MOD COMPLEX 30 MIN: CPT | Mod: GP

## 2021-05-13 PROCEDURE — 93320 DOPPLER ECHO COMPLETE: CPT

## 2021-05-13 RX ORDER — ATORVASTATIN CALCIUM 80 MG/1
80 TABLET, FILM COATED ORAL AT BEDTIME
Refills: 0 | Status: DISCONTINUED | OUTPATIENT
Start: 2021-05-13 | End: 2021-05-18

## 2021-05-13 RX ORDER — PANTOPRAZOLE SODIUM 20 MG/1
40 TABLET, DELAYED RELEASE ORAL
Refills: 0 | Status: DISCONTINUED | OUTPATIENT
Start: 2021-05-13 | End: 2021-05-18

## 2021-05-13 RX ORDER — TRAMADOL HYDROCHLORIDE 50 MG/1
50 TABLET ORAL EVERY 4 HOURS
Refills: 0 | Status: DISCONTINUED | OUTPATIENT
Start: 2021-05-13 | End: 2021-05-14

## 2021-05-13 RX ORDER — ALPRAZOLAM 0.25 MG
0.5 TABLET ORAL AT BEDTIME
Refills: 0 | Status: DISCONTINUED | OUTPATIENT
Start: 2021-05-13 | End: 2021-05-18

## 2021-05-13 RX ORDER — ASPIRIN/CALCIUM CARB/MAGNESIUM 324 MG
81 TABLET ORAL DAILY
Refills: 0 | Status: DISCONTINUED | OUTPATIENT
Start: 2021-05-13 | End: 2021-05-18

## 2021-05-13 RX ORDER — OMEPRAZOLE 10 MG/1
1 CAPSULE, DELAYED RELEASE ORAL
Qty: 0 | Refills: 0 | DISCHARGE

## 2021-05-13 RX ORDER — ALPRAZOLAM 0.25 MG
1 TABLET ORAL
Qty: 0 | Refills: 0 | DISCHARGE

## 2021-05-13 RX ORDER — ADALIMUMAB 40MG/0.8ML
0 KIT SUBCUTANEOUS
Qty: 0 | Refills: 0 | DISCHARGE

## 2021-05-13 RX ORDER — HEPARIN SODIUM 5000 [USP'U]/ML
5000 INJECTION INTRAVENOUS; SUBCUTANEOUS EVERY 12 HOURS
Refills: 0 | Status: DISCONTINUED | OUTPATIENT
Start: 2021-05-13 | End: 2021-05-17

## 2021-05-13 RX ADMIN — HEPARIN SODIUM 5000 UNIT(S): 5000 INJECTION INTRAVENOUS; SUBCUTANEOUS at 23:42

## 2021-05-13 RX ADMIN — TRAMADOL HYDROCHLORIDE 50 MILLIGRAM(S): 50 TABLET ORAL at 20:48

## 2021-05-13 NOTE — H&P ADULT - ASSESSMENT
* Expressive aphasia   had a recent adm for small thalamic cva  BP elevated   neurochecks  MRI in am  i will not repeat TTE  monitor for rash ( HZV can present with pain before rash appears )    * HTN resume home meds  better BP control might be necessary but not in the setting of possible acute/ subacute cva    PT eval    Wife present  Full code     * Expressive aphasia with left sided pain ( arm, face)  had a recent adm for small thalamic cva  BP elevated   neurochecks  MRI in am  i will not repeat TTE  monitor for rash ( HZV can present with pain before rash appears )  asa, increase statin check ldl    * HTN resume home meds  better BP control might be necessary but not in the setting of possible acute/ subacute cva    * h/o RA not on Humira due to cost    PT markal    Wife present  Full code

## 2021-05-13 NOTE — ED ADULT NURSE REASSESSMENT NOTE - NS ED NURSE REASSESS COMMENT FT1
pts wife at bedside states pt is missing cellphone that he came in with. author RN placed PIV in CT (pt went straight to CT on EMS stretcher) and did not see iphone. [facility] called, states no phones have been found but will search ambulances and call back. pts wife made aware. SAM Mathew aware.

## 2021-05-13 NOTE — H&P ADULT - NSHPLABSRESULTS_GEN_ALL_CORE
13.7   7.02  )-----------( 186      ( 13 May 2021 15:21 )             40.8   05-13    140  |  111<H>  |  15  ----------------------------<  109<H>  4.0   |  27  |  0.93    Ca    9.4      13 May 2021 15:21    TPro  6.8  /  Alb  3.4  /  TBili  0.9  /  DBili  x   /  AST  13<L>  /  ALT  21  /  AlkPhos  107  05-13

## 2021-05-13 NOTE — ED ADULT NURSE NOTE - NSIMPLEMENTINTERV_GEN_ALL_ED
Implemented All Fall with Harm Risk Interventions:  [place] to call system. Call bell, personal items and telephone within reach. Instruct patient to call for assistance. Room bathroom lighting operational. Non-slip footwear when patient is off stretcher. Physically safe environment: no spills, clutter or unnecessary equipment. Stretcher in lowest position, wheels locked, appropriate side rails in place. Provide visual cue, wrist band, yellow gown, etc. Monitor gait and stability. Monitor for mental status changes and reorient to person, place, and time. Review medications for side effects contributing to fall risk. Reinforce activity limits and safety measures with patient and family. Provide visual clues: red socks.

## 2021-05-13 NOTE — ED PROVIDER NOTE - CLINICAL SUMMARY MEDICAL DECISION MAKING FREE TEXT BOX
Not a candidate for TPA, given pt is outside of time window. Follow up CT imaging, labs discuss with neuro, likely admission.

## 2021-05-13 NOTE — ED ADULT NURSE NOTE - OBJECTIVE STATEMENT
Patient presents to the ED c/o slurred speech since 1AM, witnessed by pt's wife. Pt endorses difficulty word finding at this time. Denies chest pain, SOB, abdominal pain.

## 2021-05-13 NOTE — H&P ADULT - NSHPPHYSICALEXAM_GEN_ALL_CORE
· CONSTITUTIONAL: Well appearing, awake, alert, oriented to person, place, time/situation and in no apparent distress.  · ENMT: Airway patent, Nasal mucosa clear. Mouth with normal mucosa. Throat has no vesicles, no oropharyngeal exudates and uvula is midline.  · EYES: Clear bilaterally, pupils equal, round and reactive to light.  · CARDIAC: Normal rate, regular rhythm.  Heart sounds S1, S2.  No murmurs, rubs or gallops.  · RESPIRATORY: Breath sounds clear and equal bilaterally.  · GASTROINTESTINAL: Abdomen soft, non-tender, no guarding.  · MUSCULOSKELETAL: Spine appears normal, range of motion is not limited, no muscle or joint tenderness  · NEUROLOGICAL: Alert and oriented, no focal deficits, no motor or sensory deficits. +word finding difficulty · CONSTITUTIONAL: Well appearing, awake, alert, oriented to person, place, time/situation and in no apparent distress.  · ENMT: Airway patent, Nasal mucosa clear. Mouth with normal mucosa. Throat has no vesicles, no oropharyngeal exudates and uvula is midline.  · EYES: Clear bilaterally, pupils equal, round and reactive to light.  · CARDIAC: Normal rate, regular rhythm.  Heart sounds S1, S2.  No murmurs, rubs or gallops.  · RESPIRATORY: Breath sounds clear and equal bilaterally.  · GASTROINTESTINAL: Abdomen soft, non-tender, no guarding.  · MUSCULOSKELETAL: Spine appears normal, range of motion is not limited, no muscle or joint tenderness; bilateral fingers with ulnar deviation  · NEUROLOGICAL: Alert and oriented, no focal deficits, no motor or sensory deficits. +word finding difficulty

## 2021-05-13 NOTE — H&P ADULT - HISTORY OF PRESENT ILLNESS
85 YO M with a PMH of RA, Diverticulitis, presents to hospital for Slurred Speech that began on 3/7/21 at 10 AM when he woke up. Patient states that he has never had similar symptoms and that he is generally healthy. Patient denies headache, fever, chills, vomiting, or weakness in any extremity. He states that his family have noticed the slurred speech that has not disappeared. He denies any trouble swallowing, loss of appetite, weight loss and states that his dentures have no changes. He has not fallen, but states that he has been nauseous intermittently for the past few weeks. Per wife, he is able to perform his activities of daily living without any issues.  85 YO M with a PMH of RA, Diverticulitis, presents to hospital for Slurred Speech that began on 5/13 since 1AM, witnessed by pt's wife. Pt endorses difficulty word finding at this time. Denies any other complaints at this time. Per pt is started with a dull HA and he is still having a mild headache; he doesn't have gaze preference but he is avoiding using LUE possibly due to mild dull pain; tongue is midline, no pronator drift, persistent intermittent slurring of words ( has nothing to go with the lack of dentures) and at times has difficulty saying wife's name. Unable to count months fw or backwards

## 2021-05-14 ENCOUNTER — TRANSCRIPTION ENCOUNTER (OUTPATIENT)
Age: 86
End: 2021-05-14

## 2021-05-14 LAB
A1C WITH ESTIMATED AVERAGE GLUCOSE RESULT: 6.2 % — HIGH (ref 4–5.6)
ANION GAP SERPL CALC-SCNC: 6 MMOL/L — SIGNIFICANT CHANGE UP (ref 5–17)
APPEARANCE UR: CLEAR — SIGNIFICANT CHANGE UP
BACTERIA # UR AUTO: ABNORMAL
BILIRUB UR-MCNC: NEGATIVE — SIGNIFICANT CHANGE UP
BUN SERPL-MCNC: 12 MG/DL — SIGNIFICANT CHANGE UP (ref 7–23)
CALCIUM SERPL-MCNC: 9.4 MG/DL — SIGNIFICANT CHANGE UP (ref 8.5–10.1)
CHLORIDE SERPL-SCNC: 105 MMOL/L — SIGNIFICANT CHANGE UP (ref 96–108)
CHOLEST SERPL-MCNC: 119 MG/DL — SIGNIFICANT CHANGE UP
CO2 SERPL-SCNC: 25 MMOL/L — SIGNIFICANT CHANGE UP (ref 22–31)
COLOR SPEC: YELLOW — SIGNIFICANT CHANGE UP
COMMENT - URINE: SIGNIFICANT CHANGE UP
COVID-19 SPIKE DOMAIN AB INTERP: POSITIVE
COVID-19 SPIKE DOMAIN ANTIBODY RESULT: 2.37 U/ML — HIGH
CREAT SERPL-MCNC: 0.97 MG/DL — SIGNIFICANT CHANGE UP (ref 0.5–1.3)
DIFF PNL FLD: ABNORMAL
EPI CELLS # UR: NEGATIVE — SIGNIFICANT CHANGE UP
ERYTHROCYTE [SEDIMENTATION RATE] IN BLOOD: 31 MM/HR — HIGH (ref 0–20)
ESTIMATED AVERAGE GLUCOSE: 131 MG/DL — HIGH (ref 68–114)
GLUCOSE SERPL-MCNC: 154 MG/DL — HIGH (ref 70–99)
GLUCOSE UR QL: 1000 MG/DL
HDLC SERPL-MCNC: 47 MG/DL — SIGNIFICANT CHANGE UP
HYALINE CASTS # UR AUTO: ABNORMAL /LPF
KETONES UR-MCNC: ABNORMAL
LEUKOCYTE ESTERASE UR-ACNC: NEGATIVE — SIGNIFICANT CHANGE UP
LIPID PNL WITH DIRECT LDL SERPL: 53 MG/DL — SIGNIFICANT CHANGE UP
NITRITE UR-MCNC: NEGATIVE — SIGNIFICANT CHANGE UP
NON HDL CHOLESTEROL: 72 MG/DL — SIGNIFICANT CHANGE UP
PH UR: 5 — SIGNIFICANT CHANGE UP (ref 5–8)
POTASSIUM SERPL-MCNC: 4.1 MMOL/L — SIGNIFICANT CHANGE UP (ref 3.5–5.3)
POTASSIUM SERPL-SCNC: 4.1 MMOL/L — SIGNIFICANT CHANGE UP (ref 3.5–5.3)
PROT UR-MCNC: 30 MG/DL
RBC CASTS # UR COMP ASSIST: >50 /HPF (ref 0–4)
SARS-COV-2 IGG+IGM SERPL QL IA: 2.37 U/ML — HIGH
SARS-COV-2 IGG+IGM SERPL QL IA: POSITIVE
SODIUM SERPL-SCNC: 136 MMOL/L — SIGNIFICANT CHANGE UP (ref 135–145)
SP GR SPEC: 1.02 — SIGNIFICANT CHANGE UP (ref 1.01–1.02)
TRIGL SERPL-MCNC: 96 MG/DL — SIGNIFICANT CHANGE UP
UROBILINOGEN FLD QL: NEGATIVE MG/DL — SIGNIFICANT CHANGE UP
WBC UR QL: SIGNIFICANT CHANGE UP

## 2021-05-14 PROCEDURE — 99232 SBSQ HOSP IP/OBS MODERATE 35: CPT

## 2021-05-14 PROCEDURE — 99223 1ST HOSP IP/OBS HIGH 75: CPT

## 2021-05-14 PROCEDURE — 70551 MRI BRAIN STEM W/O DYE: CPT | Mod: 26

## 2021-05-14 RX ORDER — ACETAMINOPHEN 500 MG
650 TABLET ORAL EVERY 6 HOURS
Refills: 0 | Status: DISCONTINUED | OUTPATIENT
Start: 2021-05-14 | End: 2021-05-18

## 2021-05-14 RX ORDER — ONDANSETRON 8 MG/1
4 TABLET, FILM COATED ORAL ONCE
Refills: 0 | Status: COMPLETED | OUTPATIENT
Start: 2021-05-14 | End: 2021-05-14

## 2021-05-14 RX ORDER — FAMOTIDINE 10 MG/ML
20 INJECTION INTRAVENOUS ONCE
Refills: 0 | Status: COMPLETED | OUTPATIENT
Start: 2021-05-14 | End: 2021-05-14

## 2021-05-14 RX ORDER — PROCHLORPERAZINE MALEATE 5 MG
10 TABLET ORAL ONCE
Refills: 0 | Status: COMPLETED | OUTPATIENT
Start: 2021-05-14 | End: 2021-05-14

## 2021-05-14 RX ORDER — ONDANSETRON 8 MG/1
4 TABLET, FILM COATED ORAL EVERY 6 HOURS
Refills: 0 | Status: DISCONTINUED | OUTPATIENT
Start: 2021-05-14 | End: 2021-05-18

## 2021-05-14 RX ORDER — PANTOPRAZOLE SODIUM 20 MG/1
40 TABLET, DELAYED RELEASE ORAL ONCE
Refills: 0 | Status: COMPLETED | OUTPATIENT
Start: 2021-05-14 | End: 2021-05-14

## 2021-05-14 RX ORDER — CLOPIDOGREL BISULFATE 75 MG/1
75 TABLET, FILM COATED ORAL DAILY
Refills: 0 | Status: DISCONTINUED | OUTPATIENT
Start: 2021-05-14 | End: 2021-05-18

## 2021-05-14 RX ADMIN — HEPARIN SODIUM 5000 UNIT(S): 5000 INJECTION INTRAVENOUS; SUBCUTANEOUS at 21:12

## 2021-05-14 RX ADMIN — Medication 81 MILLIGRAM(S): at 11:15

## 2021-05-14 RX ADMIN — FAMOTIDINE 20 MILLIGRAM(S): 10 INJECTION INTRAVENOUS at 04:43

## 2021-05-14 RX ADMIN — Medication 10 MILLIGRAM(S): at 15:50

## 2021-05-14 RX ADMIN — HEPARIN SODIUM 5000 UNIT(S): 5000 INJECTION INTRAVENOUS; SUBCUTANEOUS at 11:14

## 2021-05-14 RX ADMIN — ATORVASTATIN CALCIUM 80 MILLIGRAM(S): 80 TABLET, FILM COATED ORAL at 01:36

## 2021-05-14 RX ADMIN — ONDANSETRON 4 MILLIGRAM(S): 8 TABLET, FILM COATED ORAL at 03:25

## 2021-05-14 RX ADMIN — ATORVASTATIN CALCIUM 80 MILLIGRAM(S): 80 TABLET, FILM COATED ORAL at 21:12

## 2021-05-14 RX ADMIN — ONDANSETRON 4 MILLIGRAM(S): 8 TABLET, FILM COATED ORAL at 03:57

## 2021-05-14 RX ADMIN — PANTOPRAZOLE SODIUM 40 MILLIGRAM(S): 20 TABLET, DELAYED RELEASE ORAL at 09:41

## 2021-05-14 RX ADMIN — ONDANSETRON 4 MILLIGRAM(S): 8 TABLET, FILM COATED ORAL at 09:42

## 2021-05-14 RX ADMIN — CLOPIDOGREL BISULFATE 75 MILLIGRAM(S): 75 TABLET, FILM COATED ORAL at 18:20

## 2021-05-14 NOTE — PHYSICAL THERAPY INITIAL EVALUATION ADULT - PERTINENT HX OF CURRENT PROBLEM, REHAB EVAL
Pt presents to hospital for Slurred Speech that began on 5/13 since 1AM, witnessed by pt's wife. Pt endorses difficulty word finding at this time., mild dull pain; in LUE and face, +Nausea w/ vomiting x1 this AM., had a recent admission in 3/2021 w/ acute lacunar infarction in LEFT globus pallidus, RT PCA multiple stenosis , CT  w/ old infarct noted.

## 2021-05-14 NOTE — SWALLOW BEDSIDE ASSESSMENT ADULT - SWALLOW EVAL: RECOMMENDED DIET
SUGGEST A MECHANICAL SOFT TEXTURE DIET WITH THIN LIQUIDS AS HE TOLERATES THE SAME FROM AN OROPHARYNGEAL SWALLOWING STANCE ON EXAM. FURTHER, FOOD TEXTURES ON THIS DIET ACCOMMODATES HIS EDENTULOUS STATUS.

## 2021-05-14 NOTE — CONSULT NOTE ADULT - SUBJECTIVE AND OBJECTIVE BOX
Patient is a 87y old  Male who presents with a chief complaint of Slurred Speech.       HPI:  87 YO M with a PMH of RA, Diverticulitis, presents to hospital for Slurred Speech that began on 5/13 since 1AM, witnessed by pt's wife. Pt endorses difficulty word finding at this time.  Per pt is started with a dull HA and he is still having a mild headache.    Pt seen now and he states that he came in for slurred speech but does not remember many details.  No CP or SOB.        PAST MEDICAL & SURGICAL HISTORY:  Rheumatoid arthritis    Diverticulitis    History of hernia repair    History of appendectomy        MEDICATIONS  (STANDING):  aspirin enteric coated 81 milliGRAM(s) Oral daily  atorvastatin 80 milliGRAM(s) Oral at bedtime  heparin   Injectable 5000 Unit(s) SubCutaneous every 12 hours  pantoprazole    Tablet 40 milliGRAM(s) Oral before breakfast    MEDICATIONS  (PRN):  ALPRAZolam 0.5 milliGRAM(s) Oral at bedtime PRN insomnia  dicyclomine 20 milliGRAM(s) Oral four times a day before meals PRN cramps abdominal  traMADol 50 milliGRAM(s) Oral every 4 hours PRN Moderate Pain (4 - 6)      FAMILY HISTORY:      SOCIAL HISTORY: no recent smoking     REVIEW OF SYSTEMS:  CONSTITUTIONAL:    No fatigue, malaise, lethargy.  No fever or chills.  RESPIRATORY:  No cough.  No wheeze.  No hemoptysis.  No shortness of breath.  CARDIOVASCULAR:  No chest pains.  No palpitations. No shortness of breath, No orthopnea or PND.  GASTROINTESTINAL:  No abdominal pain.  No nausea or vomiting.    GENITOURINARY:    No hematuria.    MUSCULOSKELETAL:  c/o musculoskeletal pain.  No joint swelling.  No arthritis.  NEUROLOGICAL:  No tingling or numbness or weakness.  c/o slurred speech.   PSYCHIATRIC:  No confusion  SKIN:  No rashes.             Vital Signs Last 24 Hrs  T(C): 36.7 (14 May 2021 08:48), Max: 36.8 (13 May 2021 15:30)  T(F): 98.1 (14 May 2021 08:48), Max: 98.3 (14 May 2021 01:28)  HR: 85 (14 May 2021 08:48) (67 - 90)  BP: 160/64 (14 May 2021 08:48) (108/72 - 186/90)  BP(mean): 83 (13 May 2021 23:15) (83 - 93)  RR: 18 (14 May 2021 08:48) (15 - 18)  SpO2: 98% (14 May 2021 08:48) (95% - 99%)    PHYSICAL EXAM-    Constitutional: elderly male in no acute distress     Head: Head is normocephalic and atraumatic.      Neck:  No JVD.     Cardiovascular: Regular rate and rhythm without S3, S4. No murmurs or rubs are appreciated.      Respiratory: Breath sounds are normal. No rales. No wheezing.    Abdomen: Soft, nontender, nondistended with positive bowel sounds.      Extremity: No tenderness. No  pitting edema     Neurologic: The patient is alert and oriented.      Skin: No rash, no obvious lesions noted.      Psychiatric: The patient appears to be emotionally stable.      INTERPRETATION OF TELEMETRY: sinus rythm    ECG: Sinus rythm.     I&O's Detail      LABS:                        13.7   7.02  )-----------( 186      ( 13 May 2021 15:21 )             40.8     05-14    136  |  105  |  12  ----------------------------<  154<H>  4.1   |  25  |  0.97    Ca    9.4      14 May 2021 09:17    TPro  6.8  /  Alb  3.4  /  TBili  0.9  /  DBili  x   /  AST  13<L>  /  ALT  21  /  AlkPhos  107  05-13    CARDIAC MARKERS ( 13 May 2021 15:21 )  <0.015 ng/mL / x     / x     / x     / x          PT/INR - ( 13 May 2021 15:21 )   PT: 12.0 sec;   INR: 1.03 ratio         PTT - ( 13 May 2021 15:21 )  PTT:29.9 sec    I&O's Summary    BNP  RADIOLOGY & ADDITIONAL STUDIES:  < from: CT Angio Neck w/ IV Cont (05.13.21 @ 15:31) >  IMPRESSION:        1.   Right carotid system:  No hemodynamically significant stenosis.        2.   Left carotid system:  No hemodynamically significant stenosis.        3.   Intracranial circulation:  No significant vascular lesion.        4.   Brain:  moderate periventricular white matter ischemia. Old lacunar infarction seen in the LEFT basalganglia. Old cortical infarction seen in the RIGHT parietal convexity.        5. Perfusion: No acute infarct. No hypoperfused tissue.    Critical value:  I discussed the finding of this report with Dr. Cagle at 3:30 PM on 05/13/2021.  Critical value policy of the hospital was followed.  Read back and confirmation of receipt of this communication was performed.  This verbal communication supplements the text report of this document.            AMADOR ARMAS MD; Attending Radiologist  This document has been electronically signed. May 13 2021  3:43PM    < end of copied text >  < from: TTE Echo Complete w/o Contrast w/ Doppler (03.08.21 @ 08:32) >     Impression     Summary     Mild to Moderate mitral regurgitation is present.   Fibrocalcific changes noted to the mitral valve leaflets with preserved   leaflet excursion.   Mild mitral annular calcification is present.   Mild to Moderate mitral regurgitation is present.   EA reversal of the mitral inflow consistent with reduced compliance of the   left ventricle   There are fibrocalcific changes noted to the aortic valve leaflets with   restriction in leaflet excursion. Transaortic gradients are slightly   elevated but do not meet the criteria for aortic stenosis. No aortic   insufficiency noted.   Trace to mild tricuspid valve regurgitation is present.   Normal appearing pulmonic valve structure and function.   Normal appearing left atrium.   The left ventricle is normal in size, wall thickness, wall motion and   contractility.   Estimated left ventricular ejection fraction is 55-60 %.     Signature     ----------------------------------------------------------------   Electronically signed by Laney Clark MD(Interpreting   physician) on 03/08/2021 05:31 PM   ----------------------------------------------------------------    < end of copied text >

## 2021-05-14 NOTE — SWALLOW BEDSIDE ASSESSMENT ADULT - ADDITIONAL RECOMMENDATIONS
NEURO F/U. NOTE THAT DYSARTHRIA WAS NOTED BY THIS SERVICE DURING PRIOR HOSPITALIZATION IN 3/21 BUT CURRENT APHASIA IS ACUTE.

## 2021-05-14 NOTE — SWALLOW BEDSIDE ASSESSMENT ADULT - SLP GENERAL OBSERVATIONS
The pt was alert and interactive but distractible. Visual acuity seemed decreased. Pt was able to verbalize during communicative probes. At these times, his speech output was marked by mild articulatory slurring c/w functional Dysarthria that may appear heightened at times due to edentulous. 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 Additionally, his utterances were often brief/vague/rote, variably fragmented & marked by formulation halts c/w Aphasia which is NEW compared to prior exam by this service in 3/21.

## 2021-05-14 NOTE — SWALLOW BEDSIDE ASSESSMENT ADULT - ASR SWALLOW DENTITION
note that pt did have U/L dentures in place during prior hospitalization in 3/21 but they were not in his mouth/not at bedside at time of today's exam/edentulous, does not have dentures

## 2021-05-14 NOTE — SWALLOW BEDSIDE ASSESSMENT ADULT - SWALLOW EVAL: DIAGNOSIS
1) Pt exhibits mildly to moderately prolonged but functional Oral Processing w/foods requiring mastication in setting of edentulous status(owns U/L dentures but they were NOT at bedside). Bolus formation/transfer were functional for age/unremarkable with pureed foods/liquids. Underlying pharyngeal integrity subjectively appeared to be grossly within functional parameters for age. NO behavioral aspiration signs. Odynophagia denied.  2) The pt was alert and interactive but distractible. Visual acuity seemed decreased. Pt was able to verbalize during communicative probes. At these times, his speech output was marked by mild articulatory slurring c/w functional Dysarthria that may appear heightened at times due to edentulous. 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 Additionally, his utterances were often brief/vague/rote, variably fragmented & marked by formulation halts c/w Aphasia which is NEW compared to prior exam by this service in 3/21.

## 2021-05-14 NOTE — CHART NOTE - NSCHARTNOTEFT_GEN_A_CORE
MRI Head showing : Multiple small acute/subacute infarcts noted in the left parietal lobe, left parietal occipital region and posterior left temporal lobe, largest area measuring approximately 1.9 x 1.1 cm in the left parietal-occipital region.    Discussed with Dr. Fitzgerald.  Adding Plavix.  Already on ASA and high intensity statin.  Had recent echo during last admission.  Will need FABIO, alerted Dr. Taylor.  Ok for mechanical soft diet, thin liquids.  Continue neuro checks.

## 2021-05-14 NOTE — SWALLOW BEDSIDE ASSESSMENT ADULT - COMMENTS
The pt was admitted to  with an increase in pre-existing slurred speech and new onset of verbal expression difficulties/dysnomia. 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 Further, his oropharyngeal swallowing abilities were felt to be functional for age. He did have U/L dentures in place during prior hospitalization in 3/21. Imaging of brain in hospital in 3/21 was notable for tiny punctate lacunar infarcts in left Globus Pallidus region with restricted diffusion. Other prior history is remarkable for RA, diverticulosis, prior appy, and past hernia repair. The patient was admitted to  with an increase in pre-existing slurred speech and new onset of verbal expression difficulties/dysnomia. 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 Further, his oropharyngeal swallowing abilities were felt to be functional for age at the time. He did have U/L dentures in place during prior hospitalization in 3/21. Imaging of brain in hospital in 3/21 was notable for tiny punctate lacunar infarcts in left Globus Pallidus region with restricted diffusion. Other prior history is remarkable for RA, diverticulosis, prior appy, and past hernia repair.

## 2021-05-14 NOTE — SWALLOW BEDSIDE ASSESSMENT ADULT - PHARYNGEAL PHASE
Swallow triggered in an acceptable time frame for age. Laryngeal lift on palpation during swallowing trials was felt to be functional for age as well. NO behavioral aspiration signs exhibited./Within functional limits

## 2021-05-14 NOTE — PROVIDER CONTACT NOTE (OTHER) - NAME OF MD/NP/PA/DO NOTIFIED:
Patient Information     Patient Name MRN Jordan Harden [id number] Male 2008      Nursing Note by Loli Paez at 2017  3:00 PM     Author:  Loli Paez Service:  (none) Author Type:  (none)     Filed:  2017  2:58 PM Encounter Date:  2017 Status:  Signed     :  Loli Paez            Pt here with mom for a f/u on his ADHD medication.  Mom states the med works well for the hyperactivity but is very irritable.    Loli Paez CMA ([facility])......................2017  2:51 PM          [place]

## 2021-05-14 NOTE — DISCHARGE NOTE NURSING/CASE MANAGEMENT/SOCIAL WORK - CASE MANAGER'S NAME
Sent one in to pt's pharmacy, informed her granddaughter she will need to be seen before any more will be filled.   Malena Batista RN-CM

## 2021-05-14 NOTE — PHYSICAL THERAPY INITIAL EVALUATION ADULT - DIAGNOSIS, PT EVAL
Expressive aphasia with left sided pain ( arm, face) - r/o CVA, MRI pending, weakness, Unsteady gait Expressive aphasia with left sided pain ( arm, face) - r/o CVA, weakness, Unsteady gait, MRI brain Multiple small acute/subacute infarcts noted in the left parietal lobe, left parietal/occipital and post left temporal lobe, largest area measuring approximately 1.9 x 1.1 cm in the left parietal-occipital region., Multilpe left MCA territoral acute infarcts, embolic in nature,

## 2021-05-14 NOTE — CONSULT NOTE ADULT - SUBJECTIVE AND OBJECTIVE BOX
CC: 87 y old  Male who presents with a chief complaint of Slurred Speech (14 May 2021 12:55)      HPI:  85 YO M with a PMH of RA, Diverticulitis, CVA in 3/2021, presents to ED on 5/13 at 15.20 hrs with c/o Slurred Speech that began at 1 AM witnessed by pt's wife. Pt was seen in ED, NIHSS 1 per ED physician CT angio revealed LVO or territorial stroke, pt was admitted to [facility], routine neuro consult called for AM.    Pt had been c/o left side HA, also has difficulty word finding with intermittent slurring of words, per H&P was avoiding using LUE. Pt has been nauseous and vomited few times since this morning, at the time of neuro exam, was somnolent, barely able to verbalize, wife was by his bedside, reports his BP was high, he is awaiting MRI brain.    Of note, pt was admitted to  in 3/2021 with slurred speech, noted to have acute left GP infarct and stenosis of Right PCA. He has been on BASA and Lipito 40 mg daily.      PAST MEDICAL & SURGICAL HISTORY:  Rheumatoid arthritis  Diverticulitis  History of hernia repair  History of appendectomy      FAMILY HISTORY: unable to obtain      Social Hx: Patient lives at home with his wife, is retired, He denies drug use, and states he has not smoked or had alcohol in 30+ years. 10 pack years prior to quitting.      MEDICATIONS  (STANDING):  aspirin enteric coated 81 milliGRAM(s) Oral daily  atorvastatin 80 milliGRAM(s) Oral at bedtime  clopidogrel Tablet 75 milliGRAM(s) Oral daily  heparin   Injectable 5000 Unit(s) SubCutaneous every 12 hours  pantoprazole    Tablet 40 milliGRAM(s) Oral before breakfast       Home Medications:   * Patient Currently Takes Medications as of 09-Mar-2021 12:47 documented in Structured Notes  · 	aspirin 81 mg oral delayed release tablet: 1 tab(s) orally once a day   · 	atorvastatin 40 mg oral tablet: 1 tab(s) orally once a day (at bedtime)   · 	aspirin 81 mg oral tablet, chewable: 1 tab(s) orally once a day  · 	atorvastatin 40 mg oral tablet: 1 tab(s) orally once a day (at bedtime)  · 	omeprazole 20 mg oral delayed release capsule: 1 cap(s) orally once a day  · 	Humira 40 mg/0.8 mL subcutaneous kit:   · 	ALPRAZolam 0.5 mg oral tablet: 1 tab(s) orally once a day, As Needed    .  Allergies  penicillin (Unknown)      ROS: Pertinent positives in HPI, all other ROS were reviewed and are negative.      Vital Signs Last 24 Hrs  T(C): 36.7 (14 May 2021 08:48), Max: 36.8 (14 May 2021 01:28)  T(F): 98.1 (14 May 2021 08:48), Max: 98.3 (14 May 2021 01:28)  HR: 85 (14 May 2021 08:48) (67 - 85)  BP: 160/64 (14 May 2021 08:48) (139/64 - 160/64)  BP(mean): 83 (13 May 2021 23:15) (83 - 83)  RR: 18 (14 May 2021 08:48) (17 - 18)  SpO2: 98% (14 May 2021 08:48) (95% - 98%)      Gen exam:  Normocephalic, in no distress, sleeping barely arousable.  HEENT: PERRLA, Blink to threat +,   Neck: Supple.  Respiratory: Breath sounds are clear bilaterally  Cardiovascular: S1 and S2, regular  Extremities:  no edema  Vascular: Caritid Bruit - no  Musculoskeletal: Joint / osteophytic deformities of fingers / hands  Skin: No rashes      Neurological exam:  HF: A x O x self, Aphasia - difficulty Naming and repetition intact , non fluent  CN: ROSSANA, EOMI, Decreased visual threat right side, no gross NLFD, tongue midline, Palate moves equally, SCM equal bilaterally  Motor: No pronator drift, moves both upper ext antigravity, barely moves LE's,     Sens: withdraws to light touch - all 4 extremities    Reflexes: Symmetric,  AJ 0, downgoing toes b/l  Coord: unable to perform FNF  Gait/Balance: unable to test    NIHSS: at least 7-8,( unable to assess accurately due to lethargy)      Labs:   05-14    136  |  105  |  12  ----------------------------<  154<H>  4.1   |  25  |  0.97    Ca    9.4      14 May 2021 09:17    TPro  6.8  /  Alb  3.4  /  TBili  0.9  /  DBili  x   /  AST  13<L>  /  ALT  21  /  AlkPhos  107  05-13 05-14 Chol 119 LDL -- HDL 47 Trig 96                          13.7   7.02  )-----------( 186      ( 13 May 2021 15:21 )             40.8       Radiology:  < from: MR Head No Cont (05.14.21 @ 15:50) >  FINDINGS:  There is moderate diffuse parenchymal volume loss. There are T2 prolongation signal abnormalities in the periventricular subcortical white matter likely related to severe chronic microvascular ischemic changes. Small chronic lacunar infarct left basal ganglia small chronic infarct left cerebellum    Multiple small acute/subacute infarcts noted in the left parietal lobe, left parietal occipital region and posterior left temporal lobe, largest area measuring approximately 1.9 x 1.1 cm in the left parietal-occipital region.    < from: CT Angio Head w/ IV Cont (05.13.21 @ 15:31) >  IMPRESSION:        1.   Right carotid system:  No hemodynamically significant stenosis.        2.   Left carotid system:  No hemodynamically significant stenosis.        3.   Intracranial circulation:  No significant vascular lesion.        4.   Brain:  moderate periventricular white matter ischemia. Old lacunar infarction seen in the LEFT basal ganglia. Old cortical infarction seen in the RIGHT parietal convexity.        5. Perfusion: No acute infarct. No hypoperfused tissue.      < from: MR Head No Cont (03.08.21 @ 11:10) >   tiny punctate acute lacunar infarction in the LEFT globus pallidus with restricted diffusion. There is mild to moderate periventricular and deep white matter ischemia with old cortical infarction RIGHT parietal lobe old lacunar infarctions in the LEFT caudate nucleus, RIGHT thalamus and RIGHT frontal white matter.    < from: MR Angio Head No Cont (03.08.21 @ 16:38) >  There is a focal moderate narrowing of the P1/P2 segments of the right PCA. No other high-grade narrowing or occlusion is seen in the intracranial circulation.

## 2021-05-14 NOTE — PHYSICAL THERAPY INITIAL EVALUATION ADULT - GENERAL OBSERVATIONS, REHAB EVAL
Pt was found lying in bed with tele on, Pt is slightly confused but willing to participate in PT, has slurred speech, difficulty finding words, c/o left side of face pain

## 2021-05-14 NOTE — SWALLOW BEDSIDE ASSESSMENT ADULT - SWALLOW EVAL: FEEDING ASSISTANCE
PT HAS ARTHRITIC DEFORMITIES OF HIS FINGERS & is VARIABLY DISTRACTIBLE BUT IS ABLE TO FEED SELF. PT HAS ARTHRITIC DEFORMITIES OF HIS FINGERS & IS VARIABLY DISTRACTIBLE BUT IS ABLE TO FEED SELF.

## 2021-05-14 NOTE — DISCHARGE NOTE NURSING/CASE MANAGEMENT/SOCIAL WORK - NSDCVIVACCINE_GEN_ALL_CORE_FT
Severe acute respiratory syndrome coronavirus 2 (SARS-CoV-2) (Coronavirus disease [COVID-19]) vaccine , 2021/3/9 15:26 , Reyes, Saruen (RN)

## 2021-05-14 NOTE — PROGRESS NOTE ADULT - SUBJECTIVE AND OBJECTIVE BOX
CHIEF COMPLAINT/DIAGNOSIS: slurred speech    HPI: 85 y/o Male with a PMH of RA, Diverticulitis, presents to hospital for Slurred Speech that began on 5/13 since 1AM, witnessed by pt's wife. Pt endorses difficulty word finding at this time. Denies any other complaints at this time. Per pt is started with a dull HA and he is still having a mild headache; he doesn't have gaze preference but he is avoiding using LUE possibly due to mild dull pain; tongue is midline, no pronator drift, persistent intermittent slurring of words (has nothing to go with the lack of dentures) and at times has difficulty saying wife's name. Unable to count months forward or backwards     5/14/2021: expressive aphagia still present. facial pain resolved. +Nausea w/ vomiting x1 this AM.  REVIEW OF SYSTEMS:  All other review of systems is negative unless indicated above    PHYSICAL EXAM:  Constitutional: Awake and alert   HEENT: Normal Hearing, MMM  Neck: Soft and supple  Respiratory: Breath sounds are clear bilaterally  Cardiovascular: S1 and S2, RRR  Gastrointestinal: Bowel Sounds present, soft, nontender, nondistended, no guarding, no rebound  Extremities: No peripheral edema  Vascular: 2+ peripheral pulses  Neurological: A/O x 3, no facial droop, EOMI. moves all extremities. no drift. expressive aphasia.   Musculoskeletal: 5/5 strength b/l Upper/lower extremities. LT hand  weak - chronic   Skin: No rashes    Vital Signs Last 24 Hrs  T(C): 36.7 (14 May 2021 08:48), Max: 36.8 (13 May 2021 15:30)  T(F): 98.1 (14 May 2021 08:48), Max: 98.3 (14 May 2021 01:28)  HR: 85 (14 May 2021 08:48) (67 - 90)  BP: 160/64 (14 May 2021 08:48) (108/72 - 186/90)  BP(mean): 83 (13 May 2021 23:15) (83 - 93)  RR: 18 (14 May 2021 08:48) (15 - 18)  SpO2: 98% (14 May 2021 08:48) (95% - 99%) - room air    LABS: All Labs Reviewed:                        13.7   7.02  )-----------( 186      ( 13 May 2021 15:21 )             40.8     05-14    136  |  105  |  12  ----------------------------<  154<H>  4.1   |  25  |  0.97    Ca    9.4      14 May 2021 09:17    TPro  6.8  /  Alb  3.4  /  TBili  0.9  /  DBili  x   /  AST  13<L>  /  ALT  21  /  AlkPhos  107  05-13    PT/INR - ( 13 May 2021 15:21 )   PT: 12.0 sec;   INR: 1.03 ratio      PTT - ( 13 May 2021 15:21 )  PTT:29.9 sec    CARDIAC MARKERS ( 13 May 2021 15:21 )  <0.015 ng/mL / x     / x     / x     / x        RADIOLOGY:  < from: CT Angio Neck w/ IV Cont (05.13.21 @ 15:31) >  IMPRESSION:      1.   Right carotid system:  No hemodynamically significant stenosis.      2.   Left carotid system:  No hemodynamically significant stenosis.      3.   Intracranial circulation:  No significant vascular lesion.      4.   Brain:  moderate periventricular white matter ischemia. Old lacunar infarction seen in the LEFT basalganglia. Old cortical infarction seen in the RIGHT parietal convexity.      5. Perfusion: No acute infarct. No hypoperfused tissue.  < end of copied text >    ECHOCARDIOGRAM:  < from: TTE Echo Complete w/o Contrast w/ Doppler (03.08.21 @ 08:32) >  Mild to Moderate mitral regurgitation is present.   Fibrocalcific changes noted to the mitral valve leaflets with preserved   leaflet excursion.   Mild mitral annular calcification is present.   Mild to Moderate mitral regurgitation is present.   EA reversal of the mitral inflow consistent with reduced compliance of the   left ventricle   There are fibrocalcific changes noted to the aortic valve leaflets with   restriction in leaflet excursion. Transaortic gradients are slightly   elevated but do not meet the criteria for aortic stenosis. No aortic   insufficiency noted.   Trace to mild tricuspid valve regurgitation is present.   Normal appearing pulmonic valve structure and function.   Normal appearing left atrium.   The left ventricle is normal in size, wall thickness, wall motion and   contractility.   Estimated left ventricular ejection fraction is 55-60 %.  < end of copied text >    MEDICATIONS  (STANDING):  aspirin enteric coated 81 milliGRAM(s) Oral daily  atorvastatin 80 milliGRAM(s) Oral at bedtime  heparin   Injectable 5000 Unit(s) SubCutaneous every 12 hours  pantoprazole    Tablet 40 milliGRAM(s) Oral before breakfast    MEDICATIONS  (PRN):  ALPRAZolam 0.5 milliGRAM(s) Oral at bedtime PRN insomnia  dicyclomine 20 milliGRAM(s) Oral four times a day before meals PRN cramps abdominal  traMADol 50 milliGRAM(s) Oral every 4 hours PRN Moderate Pain (4 - 6)    Home Medications:  ALPRAZolam 0.5 mg oral tablet: 1 tab(s) orally once a day (at bedtime) (13 May 2021 18:14)  Aspirin Enteric Coated 81 mg oral delayed release tablet: 1 tab(s) orally once a day (13 May 2021 18:14)  dicyclomine 20 mg oral tablet: 1 tab(s) orally 4 times a day, As Needed (13 May 2021 18:14)  hydrocodone-acetaminophen 5 mg-325 mg oral tablet: 1 tab(s) orally every 8 hours, As Needed - for severe pain (13 May 2021 18:14)  [employer] COVID-19 Vaccine PF intramuscular suspension: 0.5 milliliter(s) intramuscular once  ***Course Completed*** (13 May 2021 18:14)  omeprazole 40 mg oral delayed release capsule: 1 cap(s) orally once a day (13 May 2021 18:14)    TELEMETRY REVIEW:  5/14/2021: Sinus - Sinus Tachycardia 120. 3.5 sec pause overnight 4AM    ASSESSMENT AND PLAN:    1. Expressive aphasia with left sided pain ( arm, face) - r/o CVA  had a recent admission in 3/2021 w/ acute lacunar infarction in LEFT globus pallidus, RT PCA multiple stenosis   - CT  w/ old infarct noted. F/u MRI Head>  - Cont. ASA, Statin  - Monitor BP, keep Permissive HTN for now pending MRI  - Echo 3/2021 > NLVEF  - F/u lipid panel   - Neuro Cx: Amilcar, PT/ Speech Cx    2. HTN resume home meds  - Monitor BP, keep Permissive HTN for now pending MRI    3. Sinus Pause  - 5/14: 3.5 sec pause overnight 4AM  - monitor on tele.    4. h/o RA not on Humira due to cost    5. DVT ppx  - SQ Heparin     FULL CODE  Spoke w/ wife over phone - all questions/concerns addressed. 5/14.     CHIEF COMPLAINT/DIAGNOSIS: slurred speech    HPI: 85 y/o Male with a PMH of RA, Diverticulitis, presents to hospital for Slurred Speech that began on 5/13 since 1AM, witnessed by pt's wife. Pt endorses difficulty word finding at this time. Denies any other complaints at this time. Per pt is started with a dull HA and he is still having a mild headache; he doesn't have gaze preference but he is avoiding using LUE possibly due to mild dull pain; tongue is midline, no pronator drift, persistent intermittent slurring of words (has nothing to go with the lack of dentures) and at times has difficulty saying wife's name. Unable to count months forward or backwards     5/14/2021: expressive aphagia still present. facial pain resolved. +Nausea w/ vomiting x1 this AM.  REVIEW OF SYSTEMS:  All other review of systems is negative unless indicated above    PHYSICAL EXAM:  Constitutional: Awake and alert   HEENT: Normal Hearing, MMM  Neck: Soft and supple  Respiratory: Breath sounds are clear bilaterally  Cardiovascular: S1 and S2, RRR  Gastrointestinal: Bowel Sounds present, soft, nontender, nondistended, no guarding, no rebound  Extremities: No peripheral edema  Vascular: 2+ peripheral pulses  Neurological: A/O x 3, no facial droop, EOMI. moves all extremities. no drift. expressive aphasia.   Musculoskeletal: 5/5 strength b/l Upper/lower extremities. LT hand  weak - chronic   Skin: No rashes    Vital Signs Last 24 Hrs  T(C): 36.7 (14 May 2021 08:48), Max: 36.8 (13 May 2021 15:30)  T(F): 98.1 (14 May 2021 08:48), Max: 98.3 (14 May 2021 01:28)  HR: 85 (14 May 2021 08:48) (67 - 90)  BP: 160/64 (14 May 2021 08:48) (108/72 - 186/90)  BP(mean): 83 (13 May 2021 23:15) (83 - 93)  RR: 18 (14 May 2021 08:48) (15 - 18)  SpO2: 98% (14 May 2021 08:48) (95% - 99%) - room air    LABS: All Labs Reviewed:                        13.7   7.02  )-----------( 186      ( 13 May 2021 15:21 )             40.8     05-14    136  |  105  |  12  ----------------------------<  154<H>  4.1   |  25  |  0.97    Ca    9.4      14 May 2021 09:17    TPro  6.8  /  Alb  3.4  /  TBili  0.9  /  DBili  x   /  AST  13<L>  /  ALT  21  /  AlkPhos  107  05-13    PT/INR - ( 13 May 2021 15:21 )   PT: 12.0 sec;   INR: 1.03 ratio      PTT - ( 13 May 2021 15:21 )  PTT:29.9 sec    CARDIAC MARKERS ( 13 May 2021 15:21 )  <0.015 ng/mL / x     / x     / x     / x        RADIOLOGY:  < from: CT Angio Neck w/ IV Cont (05.13.21 @ 15:31) >  IMPRESSION:      1.   Right carotid system:  No hemodynamically significant stenosis.      2.   Left carotid system:  No hemodynamically significant stenosis.      3.   Intracranial circulation:  No significant vascular lesion.      4.   Brain:  moderate periventricular white matter ischemia. Old lacunar infarction seen in the LEFT basalganglia. Old cortical infarction seen in the RIGHT parietal convexity.      5. Perfusion: No acute infarct. No hypoperfused tissue.  < end of copied text >    ECHOCARDIOGRAM:  < from: TTE Echo Complete w/o Contrast w/ Doppler (03.08.21 @ 08:32) >  Mild to Moderate mitral regurgitation is present.   Fibrocalcific changes noted to the mitral valve leaflets with preserved   leaflet excursion.   Mild mitral annular calcification is present.   Mild to Moderate mitral regurgitation is present.   EA reversal of the mitral inflow consistent with reduced compliance of the   left ventricle   There are fibrocalcific changes noted to the aortic valve leaflets with   restriction in leaflet excursion. Transaortic gradients are slightly   elevated but do not meet the criteria for aortic stenosis. No aortic   insufficiency noted.   Trace to mild tricuspid valve regurgitation is present.   Normal appearing pulmonic valve structure and function.   Normal appearing left atrium.   The left ventricle is normal in size, wall thickness, wall motion and   contractility.   Estimated left ventricular ejection fraction is 55-60 %.  < end of copied text >    MEDICATIONS  (STANDING):  aspirin enteric coated 81 milliGRAM(s) Oral daily  atorvastatin 80 milliGRAM(s) Oral at bedtime  heparin   Injectable 5000 Unit(s) SubCutaneous every 12 hours  pantoprazole    Tablet 40 milliGRAM(s) Oral before breakfast    MEDICATIONS  (PRN):  ALPRAZolam 0.5 milliGRAM(s) Oral at bedtime PRN insomnia  dicyclomine 20 milliGRAM(s) Oral four times a day before meals PRN cramps abdominal  traMADol 50 milliGRAM(s) Oral every 4 hours PRN Moderate Pain (4 - 6)    Home Medications:  ALPRAZolam 0.5 mg oral tablet: 1 tab(s) orally once a day (at bedtime) (13 May 2021 18:14)  Aspirin Enteric Coated 81 mg oral delayed release tablet: 1 tab(s) orally once a day (13 May 2021 18:14)  dicyclomine 20 mg oral tablet: 1 tab(s) orally 4 times a day, As Needed (13 May 2021 18:14)  hydrocodone-acetaminophen 5 mg-325 mg oral tablet: 1 tab(s) orally every 8 hours, As Needed - for severe pain (13 May 2021 18:14)  [employer] COVID-19 Vaccine PF intramuscular suspension: 0.5 milliliter(s) intramuscular once  ***Course Completed*** (13 May 2021 18:14)  omeprazole 40 mg oral delayed release capsule: 1 cap(s) orally once a day (13 May 2021 18:14)    TELEMETRY REVIEW:  5/14/2021: Sinus - Sinus Tachycardia 120. 3.5 sec pause overnight 4AM    ASSESSMENT AND PLAN:    1. Expressive aphasia with left sided pain ( arm, face) - r/o CVA  had a recent admission in 3/2021 w/ acute lacunar infarction in LEFT globus pallidus, RT PCA multiple stenosis   - CT  w/ old infarct noted. F/u MRI Head>  - Cont. ASA, Statin  - Monitor BP, keep Permissive HTN for now pending MRI  - Echo 3/2021 > NLVEF  - F/u lipid panel   - Diet adjusted to mechanical soft/thin liquids  - Neuro Cx: Amilcar, PT/ Speech Cx    2. HTN resume home meds  - Monitor BP, keep Permissive HTN for now pending MRI    3. Sinus Pause  - 5/14: 3.5 sec pause overnight 4AM  - monitor on tele.    4. h/o RA not on Humira due to cost    5. Nausea  - PRN zofran    6. DVT ppx  - SQ Heparin     FULL CODE  Spoke w/ wife over phone - all questions/concerns addressed. 5/14.

## 2021-05-14 NOTE — SWALLOW BEDSIDE ASSESSMENT ADULT - NS SPL SWALLOW CLINIC TRIAL FT
NO BEHAVIORAL ASPIRATION SIGNS EHXIBITED. ODYNOPHAGIA WAS DENIED. COARSE SOLIDS NOT OFFERED GIVEN EDENTULOUS STATUS WITHOUT PLACEMENT OF DENTURES. NO BEHAVIORAL ASPIRATION SIGNS EXHIBITED. ODYNOPHAGIA WAS DENIED. COARSE SOLIDS NOT OFFERED GIVEN EDENTULOUS STATUS WITHOUT PLACEMENT OF DENTURES.

## 2021-05-14 NOTE — PHYSICAL THERAPY INITIAL EVALUATION ADULT - IMPAIRMENTS FOUND, PT EVAL
arousal, attention, and cognition/fine motor/gait, locomotion, and balance/gross motor/muscle strength

## 2021-05-14 NOTE — SWALLOW BEDSIDE ASSESSMENT ADULT - ORAL PHASE
Bolus formation/transfer with mechanical soft solids were mildly to moderately prolonged in setting of edentulous status but felt to be mechanically functional with these PO types nonetheless. Bolus formation/transfer with pureed foods/liquids were functional/unremarkable for age. Piecemeal deglutition was evident which is age acceptable. A mild amount of tongue debris was noted with mechanical soft solids.

## 2021-05-14 NOTE — DISCHARGE NOTE NURSING/CASE MANAGEMENT/SOCIAL WORK - PATIENT PORTAL LINK FT
You can access the FollowMyHealth Patient Portal offered by [facility] by registering at the following website: [email or web address]. By joining [employer]’s FollowMyHealth portal, you will also be able to view your health information using other applications (apps) compatible with our system.

## 2021-05-15 LAB
ANION GAP SERPL CALC-SCNC: 5 MMOL/L — SIGNIFICANT CHANGE UP (ref 5–17)
BUN SERPL-MCNC: 16 MG/DL — SIGNIFICANT CHANGE UP (ref 7–23)
CALCIUM SERPL-MCNC: 9.6 MG/DL — SIGNIFICANT CHANGE UP (ref 8.5–10.1)
CHLORIDE SERPL-SCNC: 106 MMOL/L — SIGNIFICANT CHANGE UP (ref 96–108)
CO2 SERPL-SCNC: 28 MMOL/L — SIGNIFICANT CHANGE UP (ref 22–31)
CREAT SERPL-MCNC: 1.03 MG/DL — SIGNIFICANT CHANGE UP (ref 0.5–1.3)
GLUCOSE SERPL-MCNC: 125 MG/DL — HIGH (ref 70–99)
HCT VFR BLD CALC: 41.1 % — SIGNIFICANT CHANGE UP (ref 39–50)
HGB BLD-MCNC: 13.6 G/DL — SIGNIFICANT CHANGE UP (ref 13–17)
MAGNESIUM SERPL-MCNC: 2.4 MG/DL — SIGNIFICANT CHANGE UP (ref 1.6–2.6)
MCHC RBC-ENTMCNC: 30.2 PG — SIGNIFICANT CHANGE UP (ref 27–34)
MCHC RBC-ENTMCNC: 33.1 GM/DL — SIGNIFICANT CHANGE UP (ref 32–36)
MCV RBC AUTO: 91.1 FL — SIGNIFICANT CHANGE UP (ref 80–100)
PLATELET # BLD AUTO: 182 K/UL — SIGNIFICANT CHANGE UP (ref 150–400)
POTASSIUM SERPL-MCNC: 3.8 MMOL/L — SIGNIFICANT CHANGE UP (ref 3.5–5.3)
POTASSIUM SERPL-SCNC: 3.8 MMOL/L — SIGNIFICANT CHANGE UP (ref 3.5–5.3)
RBC # BLD: 4.51 M/UL — SIGNIFICANT CHANGE UP (ref 4.2–5.8)
RBC # FLD: 13.1 % — SIGNIFICANT CHANGE UP (ref 10.3–14.5)
SODIUM SERPL-SCNC: 139 MMOL/L — SIGNIFICANT CHANGE UP (ref 135–145)
WBC # BLD: 6.43 K/UL — SIGNIFICANT CHANGE UP (ref 3.8–10.5)
WBC # FLD AUTO: 6.43 K/UL — SIGNIFICANT CHANGE UP (ref 3.8–10.5)

## 2021-05-15 PROCEDURE — 99233 SBSQ HOSP IP/OBS HIGH 50: CPT

## 2021-05-15 PROCEDURE — 99232 SBSQ HOSP IP/OBS MODERATE 35: CPT

## 2021-05-15 RX ADMIN — Medication 81 MILLIGRAM(S): at 09:16

## 2021-05-15 RX ADMIN — PANTOPRAZOLE SODIUM 40 MILLIGRAM(S): 20 TABLET, DELAYED RELEASE ORAL at 06:08

## 2021-05-15 RX ADMIN — HEPARIN SODIUM 5000 UNIT(S): 5000 INJECTION INTRAVENOUS; SUBCUTANEOUS at 09:16

## 2021-05-15 RX ADMIN — ATORVASTATIN CALCIUM 80 MILLIGRAM(S): 80 TABLET, FILM COATED ORAL at 21:35

## 2021-05-15 RX ADMIN — HEPARIN SODIUM 5000 UNIT(S): 5000 INJECTION INTRAVENOUS; SUBCUTANEOUS at 21:35

## 2021-05-15 RX ADMIN — Medication 650 MILLIGRAM(S): at 11:29

## 2021-05-15 RX ADMIN — Medication 650 MILLIGRAM(S): at 09:16

## 2021-05-15 RX ADMIN — CLOPIDOGREL BISULFATE 75 MILLIGRAM(S): 75 TABLET, FILM COATED ORAL at 09:16

## 2021-05-15 NOTE — PROGRESS NOTE ADULT - ASSESSMENT
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    #Multiple embolic CVA  -expressive aphasia improving  -ASA, plavix, high dose statin (increased this admission)  -seen by SLP and PT  -awaiting FABIO, likely Monday (Dr. Taylor aware)  -will need long term rhythm monitoring if tele unrevealing  -f/u neuro recs, f/u with neuro on Monday re: prior MRI in 3/21 showed R PCA stenosis but CTA this admission did not. Need to clarify which read correct.  -f/u cards recs      #HTN  - Monitor BP, permissive HTN    # Sinus Pause  - 5/14: 3.5 sec pause overnight 4AM  - monitor on tele.    # h/o RA not on Humira due to cost    # Nausea- IMPROVING  - PRN zofran    # DVT ppx  - SQ Heparin     #Wife is HCP  -also updated daughter, Starla, [phone] on 5/14 and 5/15

## 2021-05-15 NOTE — PROGRESS NOTE ADULT - SUBJECTIVE AND OBJECTIVE BOX
HOSPITALIST ATTENDING PROGRESS NOTE    Chart and meds reviewed.  Patient seen and examined.    CC: slurred speech    Subjective: Speech and word finding much improved. Denies nausea or headache. Daughter updated.    All 10 systems reviewed and found to be negative with the exception of what has been described above.    MEDICATIONS  (STANDING):  aspirin enteric coated 81 milliGRAM(s) Oral daily  atorvastatin 80 milliGRAM(s) Oral at bedtime  clopidogrel Tablet 75 milliGRAM(s) Oral daily  heparin   Injectable 5000 Unit(s) SubCutaneous every 12 hours  pantoprazole    Tablet 40 milliGRAM(s) Oral before breakfast    MEDICATIONS  (PRN):  acetaminophen   Tablet .. 650 milliGRAM(s) Oral every 6 hours PRN Temp greater or equal to 38C (100.4F), Mild Pain (1 - 3)  ALPRAZolam 0.5 milliGRAM(s) Oral at bedtime PRN insomnia  ondansetron Injectable 4 milliGRAM(s) IV Push every 6 hours PRN Nausea and/or Vomiting      VITALS:  T(F): 98.2 (05-15-21 @ 07:53), Max: 98.2 (05-15-21 @ 07:53)  HR: 70 (05-15-21 @ 07:53) (70 - 85)  BP: 132/64 (05-15-21 @ 07:53) (107/79 - 138/60)  RR: 18 (05-15-21 @ 07:53) (18 - 18)  SpO2: 99% (05-15-21 @ 07:53) (97% - 99%)  Wt(kg): --    I&O's Summary      CAPILLARY BLOOD GLUCOSE          PHYSICAL EXAM:  General: NAD  HEENT:  pupils equal and reactive, EOMI, no oropharyngeal lesions, erythema, exudates, oral thrush  NECK:   supple, no carotid bruits, no palpable lymph nodes, no thyromegaly  CV:  +S1, +S2, regular, no murmurs or rubs  RESP:   lungs clear to auscultation bilaterally, no wheezing, rales, rhonchi, good air entry bilaterally  BREAST:  not examined  GI:  abdomen soft, non-tender, non-distended, normal BS, no bruits, no abdominal masses, no palpable masses  RECTAL:  not examined  :  not examined  MSK:   normal muscle tone, no atrophy, no rigidity, no contractions  EXT:  no clubbing, no cyanosis, no edema, no calf pain, swelling or erythema  VASCULAR:  pulses equal and symmetric in the upper and lower extremities  NEURO:  AAOX3, no focal neurological deficits, follows all commands, able to move extremities spontaneously  SKIN:  no ulcers, lesions or rashes    LABS:                            13.6   6.43  )-----------( 182      ( 15 May 2021 07:12 )             41.1     05-15    139  |  106  |  16  ----------------------------<  125<H>  3.8   |  28  |  1.03    Ca    9.6      15 May 2021 07:12  Mg     2.4     -15    TPro  6.8  /  Alb  3.4  /  TBili  0.9  /  DBili  x   /  AST  13<L>  /  ALT  21  /  AlkPhos  107  05-    CARDIAC MARKERS ( 13 May 2021 15:21 )  <0.015 ng/mL / x     / x     / x     / x          LIVER FUNCTIONS - ( 13 May 2021 15:21 )  Alb: 3.4 g/dL / Pro: 6.8 gm/dL / ALK PHOS: 107 U/L / ALT: 21 U/L / AST: 13 U/L / GGT: x           PT/INR - ( 13 May 2021 15:21 )   PT: 12.0 sec;   INR: 1.03 ratio         PTT - ( 13 May 2021 15:21 )  PTT:29.9 sec  Urinalysis Basic - ( 14 May 2021 14:05 )    Color: Yellow / Appearance: Clear / S.020 / pH: x  Gluc: x / Ketone: Small  / Bili: Negative / Urobili: Negative mg/dL   Blood: x / Protein: 30 mg/dL / Nitrite: Negative   Leuk Esterase: Negative / RBC: >50 /HPF / WBC 0-2   Sq Epi: x / Non Sq Epi: Negative / Bacteria: Occasional          Blood, Urine: Moderate ( @ 14:05)      CULTURES:  Blood, Urine: Moderate ( @ 14:05)      Additional results/Imaging:  MRI brain 21: Multiple left small acute/subacute infarcts as described above. No acute intracranial hemorrhage.    CTH, CTA H/N 21:    1.   Right carotid system:  No hemodynamically significant stenosis.    2.   Left carotid system:  No hemodynamically significant stenosis.    3.   Intracranial circulation:  No significant vascular lesion.    4.   Brain:  moderate periventricular white matter ischemia. Old lacunar infarction seen in the LEFT basal ganglia. Old cortical infarction seen in the RIGHT parietal convexity.    5. Perfusion: No acute infarct. No hypoperfused tissue.

## 2021-05-15 NOTE — PROGRESS NOTE ADULT - SUBJECTIVE AND OBJECTIVE BOX
HPI:  85 YO M with a PMH of RA, Diverticulitis, presents to hospital for Slurred Speech that began on 5/13 since 1AM, witnessed by pt's wife.       MEDICATIONS  (STANDING):  aspirin enteric coated 81 milliGRAM(s) Oral daily  atorvastatin 80 milliGRAM(s) Oral at bedtime  clopidogrel Tablet 75 milliGRAM(s) Oral daily  heparin   Injectable 5000 Unit(s) SubCutaneous every 12 hours  pantoprazole    Tablet 40 milliGRAM(s) Oral before breakfast    MEDICATIONS  (PRN):  acetaminophen   Tablet .. 650 milliGRAM(s) Oral every 6 hours PRN Temp greater or equal to 38C (100.4F), Mild Pain (1 - 3)  ALPRAZolam 0.5 milliGRAM(s) Oral at bedtime PRN insomnia  ondansetron Injectable 4 milliGRAM(s) IV Push every 6 hours PRN Nausea and/or Vomiting      Vital Signs Last 24 Hrs  T(C): 36.8 (15 May 2021 07:53), Max: 36.8 (15 May 2021 07:53)  T(F): 98.2 (15 May 2021 07:53), Max: 98.2 (15 May 2021 07:53)  HR: 70 (15 May 2021 07:53) (70 - 85)  BP: 132/64 (15 May 2021 07:53) (107/79 - 138/60)  BP(mean): 81 (15 May 2021 04:10) (81 - 81)  RR: 18 (15 May 2021 07:53) (18 - 18)  SpO2: 99% (15 May 2021 07:53) (97% - 99%)  Neurological Exam:  HF: Patient is alert and oriented x 2. confused. There is no dysarthria. Follows commands.   CN: Vision is intact to confrontation. Pupils are equal and reactive. Extra ocular muscles are intact. There is no facial droop or asymmetry. Tongue is midline. Sensation is intact in the face. Other CN II-XII are intact.   Motor: motor examination all muscles are 5-/5 and there is no pronator drift.   Sensory: intact to touch.   DTR: 1-2/4 all 4 extremities. Babinski is negative bilateral.  Co-ord:  Finger to finger to nose is intact.   < from: MR Head No Cont (05.14.21 @ 15:50) >  IMPRESSION:  Multiple left small acute/subacute infarcts as described above    No acute intracranial hemorrhage    < end of copied text >  < from: CT Angio Neck w/ IV Cont (05.13.21 @ 15:31) >  IMPRESSION:        1.   Right carotid system:  No hemodynamically significant stenosis.        2.   Left carotid system:  No hemodynamically significant stenosis.        3.   Intracranial circulation:  No significant vascular lesion.        4.   Brain:  moderate periventricular white matter ischemia. Old lacunar infarction seen in the LEFT basal ganglia. Old cortical infarction seen in the RIGHT parietal convexity.        5. Perfusion: No acute infarct. No hypoperfused tissue.    < end of copied text >

## 2021-05-15 NOTE — PROGRESS NOTE ADULT - ASSESSMENT
86 YO man with a PMH of RA, Diverticulitis, CVA in 3/2021, (acute left GP infarct and stenosis of Right PCA), has been on BASA and Lipitor 40 mg daily; presented to ED on 5/13 c/o Slurred Speech , CT angio revealed no LVO or territorial stroke, hence no TPA given, pt was admitted to [facility], MRI brain Multiple small acute/subacute infarcts noted in the left parietal lobe, left parietal/occipital and post left temporal lobe region. findings c/w embolic event.  suggest:  - continue ASA 81 mg daily, add Plavix 75 mg daily  - Atorvastatin - 80 mg daily  - FABIO  - Rule out arrythmia, consider LT EKG monitor if telemetry unrevealing  -

## 2021-05-16 LAB — SARS-COV-2 RNA SPEC QL NAA+PROBE: SIGNIFICANT CHANGE UP

## 2021-05-16 PROCEDURE — 99232 SBSQ HOSP IP/OBS MODERATE 35: CPT

## 2021-05-16 RX ADMIN — Medication 81 MILLIGRAM(S): at 09:28

## 2021-05-16 RX ADMIN — HEPARIN SODIUM 5000 UNIT(S): 5000 INJECTION INTRAVENOUS; SUBCUTANEOUS at 09:28

## 2021-05-16 RX ADMIN — HEPARIN SODIUM 5000 UNIT(S): 5000 INJECTION INTRAVENOUS; SUBCUTANEOUS at 22:27

## 2021-05-16 RX ADMIN — CLOPIDOGREL BISULFATE 75 MILLIGRAM(S): 75 TABLET, FILM COATED ORAL at 09:28

## 2021-05-16 RX ADMIN — PANTOPRAZOLE SODIUM 40 MILLIGRAM(S): 20 TABLET, DELAYED RELEASE ORAL at 06:14

## 2021-05-16 RX ADMIN — ATORVASTATIN CALCIUM 80 MILLIGRAM(S): 80 TABLET, FILM COATED ORAL at 22:27

## 2021-05-16 NOTE — PROGRESS NOTE ADULT - ASSESSMENT
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    #Multiple embolic CVA  -expressive aphasia improving  -ASA, plavix, high dose statin (increased this admission)  -seen by SLP and PT  -awaiting FABIO, likely Monday (Dr. Taylor aware)  -will need long term rhythm monitoring if tele unrevealing  -f/u neuro recs, f/u with neuro on Monday re: prior MRI in 3/21 showed R PCA stenosis but CTA this admission did not. Need to clarify which read correct. (of  note, MRA was without contrast)  -f/u cards recs      #HTN  - Monitor BP, permissive HTN    # Sinus Pause  - 5/14: 3.42 sec and 3.46 sec pause overnight but  none since then. not on AV gama blocking agents. updated on-call cardiologist  - monitor on tele.    # h/o RA not on Humira due to cost    # Nausea- IMPROVING  - PRN zofran    # DVT ppx  - SQ Heparin     #Wife is HCP  -also updated daughter, Starla [phone] on 5/14 and 5/15    Dispo: inpatient.

## 2021-05-16 NOTE — PROGRESS NOTE ADULT - SUBJECTIVE AND OBJECTIVE BOX
HOSPITALIST ATTENDING PROGRESS NOTE    Chart and meds reviewed.  Patient seen and examined.    CC: slurred speech    Subjective: had 2 pauses on tele on 3/14, one for 3.42 sec and another for 3.46 sec. none since then. pt denies any complaints except for min numbness/tingling on the L side of the face/arm. also speech is imrpoving. no chest pain or dyspena    All 10 systems reviewed and found to be negative with the exception of what has been described above.    MEDICATIONS  (STANDING):  aspirin enteric coated 81 milliGRAM(s) Oral daily  atorvastatin 80 milliGRAM(s) Oral at bedtime  clopidogrel Tablet 75 milliGRAM(s) Oral daily  heparin   Injectable 5000 Unit(s) SubCutaneous every 12 hours  pantoprazole    Tablet 40 milliGRAM(s) Oral before breakfast    MEDICATIONS  (PRN):  acetaminophen   Tablet .. 650 milliGRAM(s) Oral every 6 hours PRN Temp greater or equal to 38C (100.4F), Mild Pain (1 - 3)  ALPRAZolam 0.5 milliGRAM(s) Oral at bedtime PRN insomnia  ondansetron Injectable 4 milliGRAM(s) IV Push every 6 hours PRN Nausea and/or Vomiting      T(C): 36.6 (05-16-21 @ 09:05), Max: 36.6 (05-16-21 @ 09:05)  HR: 76 (05-16-21 @ 09:05) (76 - 80)  BP: 124/54 (05-16-21 @ 09:05) (124/54 - 146/74)  RR: 17 (05-16-21 @ 09:05) (17 - 18)  SpO2: 99% (05-16-21 @ 09:05) (98% - 99%)    PHYSICAL EXAM:  General: NAD  HEENT:  pupils equal and reactive, EOMI, no oropharyngeal lesions, erythema, exudates, oral thrush  NECK:   supple, no carotid bruits, no palpable lymph nodes, no thyromegaly  CV:  +S1, +S2, regular, no murmurs or rubs  RESP:   lungs clear to auscultation bilaterally, no wheezing, rales, rhonchi, good air entry bilaterally  BREAST:  not examined  GI:  abdomen soft, non-tender, non-distended, normal BS, no bruits, no abdominal masses, no palpable masses  RECTAL:  not examined  :  not examined  MSK:   normal muscle tone, no atrophy, no rigidity, no contractions  EXT:  no clubbing, no cyanosis, no edema, no calf pain, swelling or erythema. deformity of L hand digits  VASCULAR:  pulses equal and symmetric in the upper and lower extremities  NEURO:  AAOX3, no focal neurological deficits, follows all commands, able to move extremities spontaneously  SKIN:  no ulcers, lesions or rashes    LABS: All Labs Reviewed:                        13.6   6.43  )-----------( 182      ( 15 May 2021 07:12 )             41.1     05-15    139  |  106  |  16  ----------------------------<  125<H>  3.8   |  28  |  1.03    Ca    9.6      15 May 2021 07:12  Mg     2.4     05-15          CULTURES:  Blood, Urine: Moderate (05-14 @ 14:05)      Additional results/Imaging:  MRI brain 5/14/21: Multiple left small acute/subacute infarcts as described above. No acute intracranial hemorrhage.    CTH, CTA H/N 5/13/21:    1.   Right carotid system:  No hemodynamically significant stenosis.    2.   Left carotid system:  No hemodynamically significant stenosis.    3.   Intracranial circulation:  No significant vascular lesion.    4.   Brain:  moderate periventricular white matter ischemia. Old lacunar infarction seen in the LEFT basal ganglia. Old cortical infarction seen in the RIGHT parietal convexity.    5. Perfusion: No acute infarct. No hypoperfused tissue.

## 2021-05-17 PROCEDURE — 99232 SBSQ HOSP IP/OBS MODERATE 35: CPT

## 2021-05-17 PROCEDURE — 33285 INSJ SUBQ CAR RHYTHM MNTR: CPT

## 2021-05-17 RX ADMIN — CLOPIDOGREL BISULFATE 75 MILLIGRAM(S): 75 TABLET, FILM COATED ORAL at 11:06

## 2021-05-17 RX ADMIN — HEPARIN SODIUM 5000 UNIT(S): 5000 INJECTION INTRAVENOUS; SUBCUTANEOUS at 11:06

## 2021-05-17 RX ADMIN — Medication 81 MILLIGRAM(S): at 11:06

## 2021-05-17 RX ADMIN — ATORVASTATIN CALCIUM 80 MILLIGRAM(S): 80 TABLET, FILM COATED ORAL at 21:24

## 2021-05-17 NOTE — PROGRESS NOTE ADULT - SUBJECTIVE AND OBJECTIVE BOX
Patient is a 87y old  Male who presents with a chief complaint of Slurred Speech.       HPI:  85 YO M with a PMH of RA, Diverticulitis, presents to hospital for Slurred Speech that began on 5/13 since 1AM, witnessed by pt's wife. Pt endorses difficulty word finding at this time.  Per pt is started with a dull HA and he is still having a mild headache.    5/17-   Pt seen this am.  He denies any CP or SOB.  Just woke up the patient and he appears confused.   Spoke to the overnight nurse and pt was AAO-3.  No overnight issues.         PAST MEDICAL & SURGICAL HISTORY:  Rheumatoid arthritis    Diverticulitis    History of hernia repair    History of appendectomy        MEDICATIONS  (STANDING):  aspirin enteric coated 81 milliGRAM(s) Oral daily  atorvastatin 80 milliGRAM(s) Oral at bedtime  heparin   Injectable 5000 Unit(s) SubCutaneous every 12 hours  pantoprazole    Tablet 40 milliGRAM(s) Oral before breakfast    MEDICATIONS  (PRN):  ALPRAZolam 0.5 milliGRAM(s) Oral at bedtime PRN insomnia  dicyclomine 20 milliGRAM(s) Oral four times a day before meals PRN cramps abdominal  traMADol 50 milliGRAM(s) Oral every 4 hours PRN Moderate Pain (4 - 6)      FAMILY HISTORY:      SOCIAL HISTORY: no recent smoking     REVIEW OF SYSTEMS:  CONSTITUTIONAL:    No fatigue, malaise, lethargy.  No fever or chills.  RESPIRATORY:  No cough.  No wheeze.  No hemoptysis.  No shortness of breath.  CARDIOVASCULAR:  No chest pains.  No palpitations. No shortness of breath, No orthopnea or PND.  GASTROINTESTINAL:  No abdominal pain.  No nausea or vomiting.    GENITOURINARY:    No hematuria.    MUSCULOSKELETAL:  c/o musculoskeletal pain.  No joint swelling.  No arthritis.  NEUROLOGICAL:  No tingling or numbness or weakness.  c/o slurred speech.   PSYCHIATRIC:  c/o confusion  SKIN:  No rashes.             ICU Vital Signs Last 24 Hrs  T(C): 36.7 (17 May 2021 08:21), Max: 36.7 (17 May 2021 08:21)  T(F): 98 (17 May 2021 08:21), Max: 98 (17 May 2021 08:21)  HR: 68 (17 May 2021 08:21) (68 - 92)  BP: 147/64 (17 May 2021 08:21) (124/54 - 147/64)  BP(mean): --  ABP: --  ABP(mean): --  RR: 98 (17 May 2021 08:21) (17 - 98)  SpO2: 95% (16 May 2021 19:55) (95% - 99%)      PHYSICAL EXAM-    Constitutional: elderly male in no acute distress     Head: Head is normocephalic and atraumatic.      Neck:  No JVD.     Cardiovascular: Regular rate and rhythm without S3, S4. No murmurs or rubs are appreciated.      Respiratory: Breath sounds are normal. No rales. No wheezing.    Abdomen: Soft, nontender, nondistended with positive bowel sounds.      Extremity: No tenderness. No  pitting edema     Neurologic: The patient is alert and oriented.      Skin: No rash, no obvious lesions noted.      Psychiatric: The patient appears to be emotionally stable.      INTERPRETATION OF TELEMETRY: sinus rythm    ECG: Sinus rythm.     I&O's Detail      LABS:                        13.7   7.02  )-----------( 186      ( 13 May 2021 15:21 )             40.8     05-14    136  |  105  |  12  ----------------------------<  154<H>  4.1   |  25  |  0.97    Ca    9.4      14 May 2021 09:17    TPro  6.8  /  Alb  3.4  /  TBili  0.9  /  DBili  x   /  AST  13<L>  /  ALT  21  /  AlkPhos  107  05-13    CARDIAC MARKERS ( 13 May 2021 15:21 )  <0.015 ng/mL / x     / x     / x     / x          PT/INR - ( 13 May 2021 15:21 )   PT: 12.0 sec;   INR: 1.03 ratio         PTT - ( 13 May 2021 15:21 )  PTT:29.9 sec    I&O's Summary    BNP  RADIOLOGY & ADDITIONAL STUDIES:  < from: CT Angio Neck w/ IV Cont (05.13.21 @ 15:31) >  IMPRESSION:        1.   Right carotid system:  No hemodynamically significant stenosis.        2.   Left carotid system:  No hemodynamically significant stenosis.        3.   Intracranial circulation:  No significant vascular lesion.        4.   Brain:  moderate periventricular white matter ischemia. Old lacunar infarction seen in the LEFT basalganglia. Old cortical infarction seen in the RIGHT parietal convexity.        5. Perfusion: No acute infarct. No hypoperfused tissue.    Critical value:  I discussed the finding of this report with Dr. Cagle at 3:30 PM on 05/13/2021.  Critical value policy of the hospital was followed.  Read back and confirmation of receipt of this communication was performed.  This verbal communication supplements the text report of this document.            AMADOR ARMAS MD; Attending Radiologist  This document has been electronically signed. May 13 2021  3:43PM    < end of copied text >  < from: TTE Echo Complete w/o Contrast w/ Doppler (03.08.21 @ 08:32) >     Impression     Summary     Mild to Moderate mitral regurgitation is present.   Fibrocalcific changes noted to the mitral valve leaflets with preserved   leaflet excursion.   Mild mitral annular calcification is present.   Mild to Moderate mitral regurgitation is present.   EA reversal of the mitral inflow consistent with reduced compliance of the   left ventricle   There are fibrocalcific changes noted to the aortic valve leaflets with   restriction in leaflet excursion. Transaortic gradients are slightly   elevated but do not meet the criteria for aortic stenosis. No aortic   insufficiency noted.   Trace to mild tricuspid valve regurgitation is present.   Normal appearing pulmonic valve structure and function.   Normal appearing left atrium.   The left ventricle is normal in size, wall thickness, wall motion and   contractility.   Estimated left ventricular ejection fraction is 55-60 %.     Signature     ----------------------------------------------------------------   Electronically signed by Laney Clark MD(Interpreting   physician) on 03/08/2021 05:31 PM   ----------------------------------------------------------------    < end of copied text >   Patient is a 87y old  Male who presents with a chief complaint of Slurred Speech.       HPI:  85 YO M with a PMH of RA, Diverticulitis, presents to hospital for Slurred Speech that began on 5/13 since 1AM, witnessed by pt's wife. Pt endorses difficulty word finding at this time.  Per pt is started with a dull HA and he is still having a mild headache.    5/17-   Pt seen this am.  He denies any CP or SOB.  Just woke up the patient and he appears confused.   Spoke to the overnight nurse and pt was AAO-3.  No overnight issues.         PAST MEDICAL & SURGICAL HISTORY:  Rheumatoid arthritis    Diverticulitis    History of hernia repair    History of appendectomy        MEDICATIONS  (STANDING):  aspirin enteric coated 81 milliGRAM(s) Oral daily  atorvastatin 80 milliGRAM(s) Oral at bedtime  heparin   Injectable 5000 Unit(s) SubCutaneous every 12 hours  pantoprazole    Tablet 40 milliGRAM(s) Oral before breakfast    MEDICATIONS  (PRN):  ALPRAZolam 0.5 milliGRAM(s) Oral at bedtime PRN insomnia  dicyclomine 20 milliGRAM(s) Oral four times a day before meals PRN cramps abdominal  traMADol 50 milliGRAM(s) Oral every 4 hours PRN Moderate Pain (4 - 6)      FAMILY HISTORY:      SOCIAL HISTORY: no recent smoking     REVIEW OF SYSTEMS:  CONSTITUTIONAL:    No fatigue, malaise, lethargy.  No fever or chills.  RESPIRATORY:  No cough.  No wheeze.  No hemoptysis.  No shortness of breath.  CARDIOVASCULAR:  No chest pains.  No palpitations. No shortness of breath, No orthopnea or PND.  GASTROINTESTINAL:  No abdominal pain.  No nausea or vomiting.    GENITOURINARY:    No hematuria.    MUSCULOSKELETAL:  c/o musculoskeletal pain.  No joint swelling.  No arthritis.  NEUROLOGICAL:  No tingling or numbness or weakness.  c/o slurred speech.   PSYCHIATRIC:  c/o confusion  SKIN:  No rashes.             ICU Vital Signs Last 24 Hrs  T(C): 36.7 (17 May 2021 08:21), Max: 36.7 (17 May 2021 08:21)  T(F): 98 (17 May 2021 08:21), Max: 98 (17 May 2021 08:21)  HR: 68 (17 May 2021 08:21) (68 - 92)  BP: 147/64 (17 May 2021 08:21) (124/54 - 147/64)  BP(mean): --  ABP: --  ABP(mean): --  RR: 98 (17 May 2021 08:21) (17 - 98)  SpO2: 95% (16 May 2021 19:55) (95% - 99%)      PHYSICAL EXAM-    Constitutional: elderly male in no acute distress     Head: Head is normocephalic and atraumatic.      Neck:  No JVD.     Cardiovascular: Regular rate and rhythm without S3, S4. No murmurs or rubs are appreciated.      Respiratory: Breath sounds are normal. No rales. No wheezing.    Abdomen: Soft, nontender, nondistended with positive bowel sounds.      Extremity: No tenderness. No  pitting edema     Neurologic: The patient is alert and oriented- 1.     Skin: No rash, no obvious lesions noted.      Psychiatric: The patient appears to be emotionally stable.      INTERPRETATION OF TELEMETRY: sinus rythm    ECG: Sinus rythm.     I&O's Detail      LABS:                             05-14 Chol 119 LDL -- HDL 47 Trig 96    PTT - ( 13 May 2021 15:21 )  PTT:29.9 sec    I&O's Summary    BNP  RADIOLOGY & ADDITIONAL STUDIES:  < from: CT Angio Neck w/ IV Cont (05.13.21 @ 15:31) >  IMPRESSION:        1.   Right carotid system:  No hemodynamically significant stenosis.        2.   Left carotid system:  No hemodynamically significant stenosis.        3.   Intracranial circulation:  No significant vascular lesion.        4.   Brain:  moderate periventricular white matter ischemia. Old lacunar infarction seen in the LEFT basalganglia. Old cortical infarction seen in the RIGHT parietal convexity.        5. Perfusion: No acute infarct. No hypoperfused tissue.    Critical value:  I discussed the finding of this report with Dr. Cagle at 3:30 PM on 05/13/2021.  Critical value policy of the hospital was followed.  Read back and confirmation of receipt of this communication was performed.  This verbal communication supplements the text report of this document.            AMADOR ARMAS MD; Attending Radiologist  This document has been electronically signed. May 13 2021  3:43PM    < end of copied text >  < from: TTE Echo Complete w/o Contrast w/ Doppler (03.08.21 @ 08:32) >     Impression     Summary     Mild to Moderate mitral regurgitation is present.   Fibrocalcific changes noted to the mitral valve leaflets with preserved   leaflet excursion.   Mild mitral annular calcification is present.   Mild to Moderate mitral regurgitation is present.   EA reversal of the mitral inflow consistent with reduced compliance of the   left ventricle   There are fibrocalcific changes noted to the aortic valve leaflets with   restriction in leaflet excursion. Transaortic gradients are slightly   elevated but do not meet the criteria for aortic stenosis. No aortic   insufficiency noted.   Trace to mild tricuspid valve regurgitation is present.   Normal appearing pulmonic valve structure and function.   Normal appearing left atrium.   The left ventricle is normal in size, wall thickness, wall motion and   contractility.   Estimated left ventricular ejection fraction is 55-60 %.     Signature     ----------------------------------------------------------------   Electronically signed by Laney Clark MD(Interpreting   physician) on 03/08/2021 05:31 PM   ----------------------------------------------------------------    < end of copied text >  c< from: MR Head No Cont (05.14.21 @ 15:50) >      IMPRESSION:  Multiple left small acute/subacute infarcts as described above    No acute intracranial hemorrhage            CITLALLI PHILLIPS MD; Attending Radiologist  This document has been electronically signed. May 14 2021  4:52PM    < end of copied text >

## 2021-05-17 NOTE — PROCEDURAL SAFETY CHECKLIST WITH OR WITHOUT SEDATION - NSPOSTCOMMENTFT_GEN_ALL_CORE
Patient tolerated procedure well. Wound class 1. Dressing clean dry and intact.
Probe passed @ 0937 by Dr Taylor. Bubble study x1 @0939. Probe out @0918. patient tolerated procedure well. No findings of  PFO No Clots

## 2021-05-17 NOTE — CONSULT NOTE ADULT - SUBJECTIVE AND OBJECTIVE BOX
HPI:  87 YO M with a PMH of RA, Diverticulitis, presents to hospital for Slurred Speech that began on 5/13 since 1AM, witnessed by pt's wife. Pt endorses difficulty word finding at this time. Denies any other complaints at this time. Per pt is started with a dull HA and he is still having a mild headache; he doesn't have gaze preference but he is avoiding using LUE possibly due to mild dull pain; tongue is midline, no pronator drift, persistent intermittent slurring of words ( has nothing to go with the lack of dentures) and at times has difficulty saying wife's name. Unable to count months fw or backwards    (13 May 2021 16:54)      PAST MEDICAL & SURGICAL HISTORY:  Rheumatoid arthritis    Diverticulitis    History of hernia repair    History of appendectomy        MEDICATIONS  (STANDING):  aspirin enteric coated 81 milliGRAM(s) Oral daily  atorvastatin 80 milliGRAM(s) Oral at bedtime  clopidogrel Tablet 75 milliGRAM(s) Oral daily  heparin   Injectable 5000 Unit(s) SubCutaneous every 12 hours  pantoprazole    Tablet 40 milliGRAM(s) Oral before breakfast    MEDICATIONS  (PRN):  acetaminophen   Tablet .. 650 milliGRAM(s) Oral every 6 hours PRN Temp greater or equal to 38C (100.4F), Mild Pain (1 - 3)  ALPRAZolam 0.5 milliGRAM(s) Oral at bedtime PRN insomnia  ondansetron Injectable 4 milliGRAM(s) IV Push every 6 hours PRN Nausea and/or Vomiting      Allergies    penicillin (Unknown)      SOCIAL HISTORY:     FAMILY HISTORY:      Vital Signs Last 24 Hrs  T(C): 36.7 (17 May 2021 09:18), Max: 36.7 (17 May 2021 08:21)  T(F): 98 (17 May 2021 09:18), Max: 98 (17 May 2021 08:21)  HR: 64 (17 May 2021 10:15) (64 - 92)  BP: 133/66 (17 May 2021 10:15) (111/67 - 175/75)  BP(mean): --  RR: 16 (17 May 2021 10:15) (16 - 98)  SpO2: 97% (17 May 2021 10:15) (95% - 99%)      CONSTITUTIONAL: No fever, weight loss, chills, shakes, or fatigue  EYES: No eye pain, visual disturbances, or discharge  ENMT:  No difficulty hearing, tinnitus, vertigo; No sinus or throat pain  RESPIRATORY: No cough, wheezing, hemoptysis, or shortness of breath  CARDIOVASCULAR: No chest pain, dyspnea, palpitations, dizziness, syncope  GASTROINTESTINAL: No abdominal  pain, nausea, vomiting, diarrhea, constipation, melena or bright red blood.  GENITOURINARY: No dysuria, nocturia, hematuria, or urinary incontinence  NEUROLOGICAL: No headaches, memory loss, slurred speech, limb weakness, loss of strength, numbness, or tremors  SKIN: No itching, burning, rashes, or lesions   ENDOCRINE: No heat or cold intolerance, or hair loss  MUSCULOSKELETAL: No joint pain or swelling, muscle, back, or extremity pain  PSYCHIATRIC: No depression, anxiety, or difficulty sleeping  HEME/LYMPH: No easy bruising or bleeding gums  ALLERY AND IMMUNOLOGIC: No hives or rash.    PHYSICAL EXAMINATION:    Constitutional: NAD, awake and alert, well-developed  HEENT: PERR, EOMI,  No oral cyananosis.  Neck:  supple,  No JVD  Respiratory: Breath sounds are clear bilaterally, No wheezing, rales or rhonchi  Cardiovascular: S1 and S2, regular rate and rhythm, no Murmurs, gallops or rubs  Gastrointestinal: Bowel Sounds present, soft, nontender.   Extremities: No peripheral edema. No clubbing or cyanosis.  Vascular: 2+ peripheral pulses  Neurological: A/O x 3, no focal deficits  Musculoskeletal: no calf tenderness.  Skin: No rashes.      LABS:                  EKG: sinus rhythm 70bpm with 1st degree AVB, Jeanette 216ms, QRS 156ms, QTc 501ms, IVCD    TELEMETRY: sinus rhythm in 80s, episodes of ST in 120s    CARDIAC TESTS:  ECHO 5/15/2021   Summary     Mild (1+) mitral regurgitation is present.   Trace tricuspid valve regurgitation is present.   Mild aortic sclerosis is present with normal valvular opening.   Normal appearing pulmonic valve structure and function.   The left atrium is mildly dilated.   The left ventricle is normal in size, wall thickness, wall motion and   contractility.   Normal appearing right ventricle structure and function.   Normal appearing right atrium.   No intracardiac masses, thrombi or vegetations were seen.   Mild atheroscleorsis of the thoracic aorta.   No PFO/ASD.     Signature     ----------------------------------------------------------------   Electronically signed by Lashawn España MD([facility] [place]   physician) on 05/17/2021 10:16 AM   ----------------------------------------------------------------   HPI:  85 YO M with a PMH of RA, Diverticulitis, presents to hospital for Slurred Speech that began on 5/13 since 1AM, witnessed by pt's wife. Pt endorses difficulty word finding at this time. Denies any other complaints at this time. Per pt is started with a dull HA and he is still having a mild headache; he doesn't have gaze preference but he is avoiding using LUE possibly due to mild dull pain; tongue is midline, no pronator drift, persistent intermittent slurring of words ( has nothing to go with the lack of dentures) and at times has difficulty saying wife's name. Unable to count months fw or backwards    (13 May 2021 16:54)      PAST MEDICAL & SURGICAL HISTORY:  Rheumatoid arthritis    Diverticulitis    History of hernia repair    History of appendectomy        MEDICATIONS  (STANDING):  aspirin enteric coated 81 milliGRAM(s) Oral daily  atorvastatin 80 milliGRAM(s) Oral at bedtime  clopidogrel Tablet 75 milliGRAM(s) Oral daily  heparin   Injectable 5000 Unit(s) SubCutaneous every 12 hours  pantoprazole    Tablet 40 milliGRAM(s) Oral before breakfast    MEDICATIONS  (PRN):  acetaminophen   Tablet .. 650 milliGRAM(s) Oral every 6 hours PRN Temp greater or equal to 38C (100.4F), Mild Pain (1 - 3)  ALPRAZolam 0.5 milliGRAM(s) Oral at bedtime PRN insomnia  ondansetron Injectable 4 milliGRAM(s) IV Push every 6 hours PRN Nausea and/or Vomiting      Allergies    penicillin (Unknown)      Vital Signs Last 24 Hrs  T(C): 36.7 (17 May 2021 09:18), Max: 36.7 (17 May 2021 08:21)  T(F): 98 (17 May 2021 09:18), Max: 98 (17 May 2021 08:21)  HR: 64 (17 May 2021 10:15) (64 - 92)  BP: 133/66 (17 May 2021 10:15) (111/67 - 175/75)  BP(mean): --  RR: 16 (17 May 2021 10:15) (16 - 98)  SpO2: 97% (17 May 2021 10:15) (95% - 99%)      CONSTITUTIONAL: No fever, weight loss, chills, shakes, or fatigue  EYES: No eye pain, visual disturbances, or discharge  ENMT:  No difficulty hearing, tinnitus, vertigo; No sinus or throat pain  RESPIRATORY: No cough, wheezing, hemoptysis, or shortness of breath  CARDIOVASCULAR: No chest pain, dyspnea, palpitations, dizziness, syncope  GASTROINTESTINAL: No abdominal  pain, nausea, vomiting, diarrhea, constipation, melena or bright red blood.  GENITOURINARY: No dysuria, nocturia, hematuria, or urinary incontinence  NEUROLOGICAL: No headaches, memory loss, slurred speech, limb weakness, loss of strength, numbness, or tremors  SKIN: No itching, burning, rashes, or lesions   ENDOCRINE: No heat or cold intolerance, or hair loss  MUSCULOSKELETAL: No joint pain or swelling, muscle, back, or extremity pain  PSYCHIATRIC: No depression, anxiety, or difficulty sleeping  HEME/LYMPH: No easy bruising or bleeding gums  ALLERY AND IMMUNOLOGIC: No hives or rash.    PHYSICAL EXAMINATION:    Constitutional: NAD, awake and alert, well-developed  HEENT: PERR, EOMI,  No oral cyananosis.  Neck:  supple,  No JVD  Respiratory: Breath sounds are clear bilaterally, No wheezing, rales or rhonchi  Cardiovascular: S1 and S2, regular rate and rhythm, no Murmurs, gallops or rubs  Gastrointestinal: Bowel Sounds present, soft, nontender.   Extremities: No peripheral edema. No clubbing or cyanosis.  Vascular: 2+ peripheral pulses  Neurological: A/O x 3, no focal deficits  Musculoskeletal: no calf tenderness.  Skin: No rashes.      LABS:                  EKG: sinus rhythm 70bpm with 1st degree AVB, Jeanette 216ms, QRS 156ms, QTc 501ms, IVCD    TELEMETRY: sinus rhythm in 80s, episodes of ST in 120s, single episode of 3.3sec pause overnight    CARDIAC TESTS:  ECHO 5/15/2021   Summary     Mild (1+) mitral regurgitation is present.   Trace tricuspid valve regurgitation is present.   Mild aortic sclerosis is present with normal valvular opening.   Normal appearing pulmonic valve structure and function.   The left atrium is mildly dilated.   The left ventricle is normal in size, wall thickness, wall motion and   contractility.   Normal appearing right ventricle structure and function.   Normal appearing right atrium.   No intracardiac masses, thrombi or vegetations were seen.   Mild atheroscleorsis of the thoracic aorta.   No PFO/ASD.     Signature     ----------------------------------------------------------------   Electronically signed by Lashawn España MD([facility]   physician) on 05/17/2021 10:16 AM   ----------------------------------------------------------------

## 2021-05-17 NOTE — CONSULT NOTE ADULT - ASSESSMENT
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  ashasia resolved now.   EP consulted for ILR placement for long term arrhythmia monitoring.   will plan for this afternoon. 
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 In ED, NIHSS 1 per EDP, CT angio revealed no LVO or territorial stroke, hence no TPA given, pt was admitted to tele, has been c/o left side HA, word finding difficulty, has been nauseous, vomited few times and is somnolent.    MRI brain Multiple small acute/subacute infarcts noted in the left parietal lobe, left parietal/occipital and post left temporal lobe, largest area measuring approximately 1.9 x 1.1 cm in the left parietal-occipital region.    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 NIHSS 7-8, not accurate as pt is somnolent.    # Left side HA    - continue ASA 81 mg daily, add Plavix 75 mg daily  - Atorvastatin - 80 mg daily  - FABIO  - Rule out arrythmia   - DVT prophylaxis   -Speech and swallow eval  - PT eval  - Labs: ESR    Management d/w Pt's wife and Dr. Laxmi Pinedo is on call from 5/15/21, he will follow-up
Slurred speech- MRI pending.  Neurology team following pt.  D/W Dr Hair.  No afib on tele.  he will need long term monitoring for afib.  Continue asa.     Hyperlipidemia- continue statin.    HTN- BP mildy elevated.  BP goals per neuro team.    Other medical issues- Management per primary team.   Thank you for allowing me to participate in the care of this patient. Please feel free to contact me with any questions.

## 2021-05-17 NOTE — PROGRESS NOTE ADULT - ASSESSMENT
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    #Multiple embolic CVA  -expressive aphasia improving  -ASA, plavix, high dose statin (increased this admission)  - seen by SLP and PT  - s/p FABIO on 5/17 >> neg, no PFO,  No intracardiac masses, thrombi or vegetations were seen.  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 Need to clarify which read correct. (of  note, MRA was without contrast)  - EP consult for loop recorder placement     #HTN  - Monitor BP, stable    # Sinus Pause  - 5/14: 3.42 sec and 3.46 sec pause overnight but  none since then. not on AV gama blocking agents. updated on-call cardiologist  - monitor on tele.    # h/o RA not on Humira due to cost    # Nausea- IMPROVING  - PRN zofran    # DVT ppx  - SQ Heparin     #Wife is HCP  -also updated daughter, Starla, [phone] on 5/14 and 5/15    Dispo: inpatient.  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    #Multiple embolic CVA  - Expressive aphasia improving  - ASA, Plavix, high dose statin (increased this admission)  - Seen by SLP and PT  - S/p FABIO on 5/17 >> neg, no PFO,  No intracardiac masses, thrombi or vegetations were seen.  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 Need to clarify which read correct. (of  note, MRA was without contrast)  - EP consult for loop recorder placement td or tm.    #HTN  - Monitor BP, stable    # Sinus Pause  - 5/14: 3.42 sec and 3.46 sec pause overnight but  none since then. not on AV gama blocking agents.  - monitor on tele.    # h/o RA not on Humira due to cost    # Nausea- IMPROVING  - PRN Zofran     # DVT ppx - SQ Heparin     FULL CODE  #Wife is HCP -- updated 5/17 AM  -also updated daughter, Starla, [phone] on 5/14 and 5/15    Dispo: inpatient.

## 2021-05-17 NOTE — PROGRESS NOTE ADULT - SUBJECTIVE AND OBJECTIVE BOX
Chart and meds reviewed.  Patient seen and examined.    CC: slurred speech    Subjective: had 2 pauses on tele on 3/14, one for 3.42 sec and another for 3.46 sec. none since then. pt denies any complaints except for min numbness/tingling on the L side of the face/arm. also speech is imrpoving. no chest pain or dyspena    All 10 systems reviewed and found to be negative with the exception of what has been described above.    MEDICATIONS  (STANDING):  aspirin enteric coated 81 milliGRAM(s) Oral daily  atorvastatin 80 milliGRAM(s) Oral at bedtime  clopidogrel Tablet 75 milliGRAM(s) Oral daily  heparin   Injectable 5000 Unit(s) SubCutaneous every 12 hours  pantoprazole    Tablet 40 milliGRAM(s) Oral before breakfast    MEDICATIONS  (PRN):  acetaminophen   Tablet .. 650 milliGRAM(s) Oral every 6 hours PRN Temp greater or equal to 38C (100.4F), Mild Pain (1 - 3)  ALPRAZolam 0.5 milliGRAM(s) Oral at bedtime PRN insomnia  ondansetron Injectable 4 milliGRAM(s) IV Push every 6 hours PRN Nausea and/or Vomiting    Vital Signs Last 24 Hrs  T(C): 36.7 (17 May 2021 09:18), Max: 36.7 (17 May 2021 08:21)  T(F): 98 (17 May 2021 09:18), Max: 98 (17 May 2021 08:21)  HR: 64 (17 May 2021 10:15) (64 - 92)  BP: 133/66 (17 May 2021 10:15) (111/67 - 175/75)  BP(mean): --  RR: 16 (17 May 2021 10:15) (16 - 98)  SpO2: 97% (17 May 2021 10:15) (95% - 99%)    PHYSICAL EXAM:  General: NAD  HEENT:  pupils equal and reactive, EOMI, no oropharyngeal lesions, erythema, exudates, oral thrush  NECK:   supple, no carotid bruits, no palpable lymph nodes, no thyromegaly  CV:  +S1, +S2, regular, no murmurs or rubs  RESP:   lungs clear to auscultation bilaterally, no wheezing, rales, rhonchi, good air entry bilaterally  BREAST:  not examined  GI:  abdomen soft, non-tender, non-distended, normal BS, no bruits, no abdominal masses, no palpable masses  RECTAL:  not examined  :  not examined  MSK:   normal muscle tone, no atrophy, no rigidity, no contractions  EXT:  no clubbing, no cyanosis, no edema, no calf pain, swelling or erythema. deformity of L hand digits  VASCULAR:  pulses equal and symmetric in the upper and lower extremities  NEURO:  AAOX3, no focal neurological deficits, follows all commands, able to move extremities spontaneously  SKIN:  no ulcers, lesions or rashes    LABS: All Labs Reviewed:    ECHOCARDIOGRAM:  < from: Transesophageal Echocardiogram (05.15.21 @ 13:36) >   Mild (1+) mitral regurgitation is present.   Trace tricuspid valve regurgitation is present.   Mild aortic sclerosis is present with normal valvular opening.   Normal appearing pulmonic valve structure and function.   The left atrium is mildly dilated.   The left ventricle is normal in size, wall thickness, wall motion and   contractility.   Normal appearing right ventricle structure and function.   Normal appearing right atrium.   No intracardiac masses, thrombi or vegetations were seen.   Mild atheroscleorsis of the thoracic aorta.   No PFO/ASD.  < end of copied text >    CULTURES: Blood, Urine: Moderate (05-14 @ 14:05)    Additional results/Imaging: MRI brain 5/14/21: Multiple left small acute/subacute infarcts as described above. No acute intracranial hemorrhage.    CTH, CTA H/N 5/13/21:    1.   Right carotid system:  No hemodynamically significant stenosis.    2.   Left carotid system:  No hemodynamically significant stenosis.    3.   Intracranial circulation:  No significant vascular lesion.    4.   Brain:  moderate periventricular white matter ischemia. Old lacunar infarction seen in the LEFT basal ganglia. Old cortical infarction seen in the RIGHT parietal convexity.    5. Perfusion: No acute infarct. No hypoperfused tissue.   Chart and meds reviewed.  Patient seen and examined.    CC: slurred speech    Subjective: had 2 pauses on tele on 3/14, one for 3.42 sec and another for 3.46 sec. none since then. pt denies any complaints except for min numbness/tingling on the L side of the face/arm. also speech is imrpoving. no chest pain or dyspena    All 10 systems reviewed and found to be negative with the exception of what has been described above.    MEDICATIONS  (STANDING):  aspirin enteric coated 81 milliGRAM(s) Oral daily  atorvastatin 80 milliGRAM(s) Oral at bedtime  clopidogrel Tablet 75 milliGRAM(s) Oral daily  heparin   Injectable 5000 Unit(s) SubCutaneous every 12 hours  pantoprazole    Tablet 40 milliGRAM(s) Oral before breakfast    MEDICATIONS  (PRN):  acetaminophen   Tablet .. 650 milliGRAM(s) Oral every 6 hours PRN Temp greater or equal to 38C (100.4F), Mild Pain (1 - 3)  ALPRAZolam 0.5 milliGRAM(s) Oral at bedtime PRN insomnia  ondansetron Injectable 4 milliGRAM(s) IV Push every 6 hours PRN Nausea and/or Vomiting    Vital Signs Last 24 Hrs  T(C): 36.7 (17 May 2021 09:18), Max: 36.7 (17 May 2021 08:21)  T(F): 98 (17 May 2021 09:18), Max: 98 (17 May 2021 08:21)  HR: 64 (17 May 2021 10:15) (64 - 92)  BP: 133/66 (17 May 2021 10:15) (111/67 - 175/75)  BP(mean): --  RR: 16 (17 May 2021 10:15) (16 - 98)  SpO2: 97% (17 May 2021 10:15) (95% - 99%)    PHYSICAL EXAM:  General: NAD  HEENT:  pupils equal and reactive, EOMI, no oropharyngeal lesions, erythema, exudates, oral thrush  NECK:   supple, no carotid bruits, no palpable lymph nodes, no thyromegaly  CV:  +S1, +S2, regular, no murmurs or rubs  RESP:   lungs clear to auscultation bilaterally, no wheezing, rales, rhonchi, good air entry bilaterally  BREAST:  not examined  GI:  abdomen soft, non-tender, non-distended, normal BS, no bruits, no abdominal masses, no palpable masses  MSK:   normal muscle tone, no atrophy, no rigidity, no contractions  EXT:  no clubbing, no cyanosis, no edema, no calf pain, swelling or erythema. deformity of L hand digits  VASCULAR:  pulses equal and symmetric in the upper and lower extremities  NEURO:  AAOX3, no focal neurological deficits, follows all commands, able to move extremities spontaneously  SKIN:  no ulcers, lesions or rashes    LABS: All Labs Reviewed:    ECHOCARDIOGRAM:  < from: Transesophageal Echocardiogram (05.15.21 @ 13:36) >   Mild (1+) mitral regurgitation is present.   Trace tricuspid valve regurgitation is present.   Mild aortic sclerosis is present with normal valvular opening.   Normal appearing pulmonic valve structure and function.   The left atrium is mildly dilated.   The left ventricle is normal in size, wall thickness, wall motion and   contractility.   Normal appearing right ventricle structure and function.   Normal appearing right atrium.   No intracardiac masses, thrombi or vegetations were seen.   Mild atheroscleorsis of the thoracic aorta.   No PFO/ASD.  < end of copied text >    CULTURES: Blood, Urine: Moderate (05-14 @ 14:05)    Additional results/Imaging: MRI brain 5/14/21: Multiple left small acute/subacute infarcts as described above. No acute intracranial hemorrhage.    CTH, CTA H/N 5/13/21:    1.   Right carotid system:  No hemodynamically significant stenosis.    2.   Left carotid system:  No hemodynamically significant stenosis.    3.   Intracranial circulation:  No significant vascular lesion.    4.   Brain:  moderate periventricular white matter ischemia. Old lacunar infarction seen in the LEFT basal ganglia. Old cortical infarction seen in the RIGHT parietal convexity.    5. Perfusion: No acute infarct. No hypoperfused tissue.

## 2021-05-17 NOTE — PROGRESS NOTE ADULT - ASSESSMENT
Slurred speech- MRI pending.  Neurology team following pt.  D/W Dr Hair.  No afib on tele.  he will need long term monitoring for afib.  Continue asa.     Hyperlipidemia- continue statin.    HTN- BP mildy elevated.  BP goals per neuro team.    Other medical issues- Management per primary team.   Thank you for allowing me to participate in the care of this patient. Please feel free to contact me with any questions.    Slurred speech- MRI results as stated above.  On DAPT.    FABIO today 230pm.  Dr Ansari will be performing the FABIO.  NPO now.    Discussed with cath lab staff this am.  They do not have any slots atleast till 10.30am for FABIO to be performed.  Spoke with Dr Foster and Dr Buenrostro.  D/W primary team.       Hyperlipidemia- continue statin.    HTN- BP mildly elevated at times.  Continue current meds.    Bradycardia with pauses- no further pauses since 5/14- avoid AV gama blockers.      Other medical issues- Management per primary team.   Thank you for allowing me to participate in the care of this patient. Please feel free to contact me with any questions.

## 2021-05-17 NOTE — PACU DISCHARGE NOTE - COMMENTS
Report given to Monie Pan RN, confirmed CV tracing NSR. Patient denies complaint of pain or discomfort. Awaiting transporter to escort  patient to 3E/
Report given to Cecelia TRUONG. Patient A&Ox4, speech clear. V/S/S. . Patient placed on portable monitor, monitoring confirmed by Cecelia TRUONG

## 2021-05-18 ENCOUNTER — TRANSCRIPTION ENCOUNTER (OUTPATIENT)
Age: 86
End: 2021-05-18

## 2021-05-18 VITALS
HEART RATE: 87 BPM | RESPIRATION RATE: 18 BRPM | SYSTOLIC BLOOD PRESSURE: 101 MMHG | TEMPERATURE: 98 F | DIASTOLIC BLOOD PRESSURE: 53 MMHG | OXYGEN SATURATION: 98 %

## 2021-05-18 PROCEDURE — 99239 HOSP IP/OBS DSCHRG MGMT >30: CPT

## 2021-05-18 RX ORDER — JNJ-78436735 50000000000 [PFU]/.5ML
0.5 SUSPENSION INTRAMUSCULAR
Qty: 0 | Refills: 0 | DISCHARGE

## 2021-05-18 RX ORDER — CLOPIDOGREL BISULFATE 75 MG/1
1 TABLET, FILM COATED ORAL
Qty: 0 | Refills: 0 | DISCHARGE
Start: 2021-05-18

## 2021-05-18 RX ORDER — CLOPIDOGREL BISULFATE 75 MG/1
1 TABLET, FILM COATED ORAL
Qty: 30 | Refills: 0
Start: 2021-05-18 | End: 2021-06-16

## 2021-05-18 RX ADMIN — CLOPIDOGREL BISULFATE 75 MILLIGRAM(S): 75 TABLET, FILM COATED ORAL at 09:11

## 2021-05-18 RX ADMIN — Medication 650 MILLIGRAM(S): at 01:26

## 2021-05-18 RX ADMIN — PANTOPRAZOLE SODIUM 40 MILLIGRAM(S): 20 TABLET, DELAYED RELEASE ORAL at 05:59

## 2021-05-18 RX ADMIN — Medication 650 MILLIGRAM(S): at 01:45

## 2021-05-18 RX ADMIN — Medication 81 MILLIGRAM(S): at 09:11

## 2021-05-18 NOTE — PROGRESS NOTE ADULT - ASSESSMENT
Slurred speech- MRI results as stated above.  On DAPT.    s/p FABIO- no intracardiac thrombus.  S/p LOop recorder implant   DC planning.  D/W Wife yesterday,  Will followup as outpt, he has appt to followup with me.   D/W primary team.       Hyperlipidemia- continue statin.    HTN- BP mildly elevated at times.  Continue current meds.    Bradycardia with pauses- no further pauses since 5/14- avoid AV gama blockers.      Other medical issues- Management per primary team.   Thank you for allowing me to participate in the care of this patient. Please feel free to contact me with any questions.

## 2021-05-18 NOTE — DISCHARGE NOTE PROVIDER - NSDCCPCAREPLAN_GEN_ALL_CORE_FT
PRINCIPAL DISCHARGE DIAGNOSIS  Diagnosis: CVA (cerebrovascular accident)  Assessment and Plan of Treatment: WHAT IS A STROKE? A stroke (CVA) occurs when blood flow to brain is interrupted causing lack of oxygen.  THINGS TO DO: (1) Manage your health conditions like diabetes or hypertension (2) Monitor your blood pressure (3) Avoid nicotine products – smoking can cause damage to your blood vessels and increase your risk for a stroke (4) Maintain a healthy weight (5) Stay active with exercise (6) Limit or avoid alcohol intake – alcohol can raise your blood pressure (7) Eat heart healthy foods like fruits, vegetables, and whole grains  MONITOR THESE SIGNS AND SYMPTOMS: (1) Loss of balance or confusion (2) Double vision or vision loss (3) Chest pain or shortness of breath (4) Trouble swallowing. If you experience any of these, DO alert your primary care provider, or return to the Emergency Department if you feel very sick.   - You had a loop recorder placed - Follow up with the EP team - appointment made. Follow up with Dr. Taylor (Cardiologist) and Dr. Fitzgerald (Neurologist) for further management.   - Continue Plavix 75mg once a day (new medication, sent to pharmacy)

## 2021-05-18 NOTE — DISCHARGE NOTE PROVIDER - NSDCFUADDAPPT_GEN_ALL_CORE_FT
** Cardiologist - Dr. Taylor appointment made on Friday at 11:15AM - [address] [facility] [address] [phone] ext [phone] **

## 2021-05-18 NOTE — DISCHARGE NOTE PROVIDER - NSDCMRMEDTOKEN_GEN_ALL_CORE_FT
ALPRAZolam 0.5 mg oral tablet: 1 tab(s) orally once a day (at bedtime)  Aspirin Enteric Coated 81 mg oral delayed release tablet: 1 tab(s) orally once a day  atorvastatin 40 mg oral tablet: 1 tab(s) orally once a day (at bedtime)   clopidogrel 75 mg oral tablet: 1 tab(s) orally once a day  hydrocodone-acetaminophen 5 mg-325 mg oral tablet: 1 tab(s) orally every 8 hours, As Needed - for severe pain  omeprazole 40 mg oral delayed release capsule: 1 cap(s) orally once a day

## 2021-05-18 NOTE — DISCHARGE NOTE PROVIDER - NSDCPNSUBOBJ_GEN_ALL_CORE
Chart and meds reviewed.  Patient seen and examined.    CC: slurred speech    Subjective: had 2 pauses on tele on 3/14, one for 3.42 sec and another for 3.46 sec. none since then. pt denies any complaints except for min numbness/tingling on the L side of the face/arm. also speech is imrpoving. no chest pain or dyspena    All 10 systems reviewed and found to be negative with the exception of what has been described above.    MEDICATIONS  (STANDING):  aspirin enteric coated 81 milliGRAM(s) Oral daily  atorvastatin 80 milliGRAM(s) Oral at bedtime  clopidogrel Tablet 75 milliGRAM(s) Oral daily  pantoprazole    Tablet 40 milliGRAM(s) Oral before breakfast    MEDICATIONS  (PRN):  acetaminophen   Tablet .. 650 milliGRAM(s) Oral every 6 hours PRN Temp greater or equal to 38C (100.4F), Mild Pain (1 - 3)  ALPRAZolam 0.5 milliGRAM(s) Oral at bedtime PRN insomnia  ondansetron Injectable 4 milliGRAM(s) IV Push every 6 hours PRN Nausea and/or Vomiting    Vital Signs Last 24 Hrs  T(C): 36.5 (18 May 2021 08:45), Max: 36.8 (17 May 2021 19:42)  T(F): 97.7 (18 May 2021 08:45), Max: 98.2 (17 May 2021 19:42)  HR: 87 (18 May 2021 08:45) (81 - 90)  BP: 101/53 (18 May 2021 08:45) (101/53 - 154/67)  BP(mean): --  RR: 18 (18 May 2021 08:45) (18 - 18)  SpO2: 98% (18 May 2021 08:45) (96% - 99%)    PHYSICAL EXAM:  General: NAD  HEENT:  pupils equal and reactive, EOMI, no oropharyngeal lesions, erythema, exudates, oral thrush  NECK:   supple, no carotid bruits, no palpable lymph nodes, no thyromegaly  CV:  +S1, +S2, regular, no murmurs or rubs  RESP:   lungs clear to auscultation bilaterally, no wheezing, rales, rhonchi, good air entry bilaterally  BREAST:  not examined  GI:  abdomen soft, non-tender, non-distended, normal BS, no bruits, no abdominal masses, no palpable masses  MSK:   normal muscle tone, no atrophy, no rigidity, no contractions  EXT:  no clubbing, no cyanosis, no edema, no calf pain, swelling or erythema. deformity of L hand digits  VASCULAR:  pulses equal and symmetric in the upper and lower extremities  NEURO:  AAOX3, no focal neurological deficits, follows all commands, able to move extremities spontaneously  SKIN:  no ulcers, lesions or rashes    LABS: All Labs Reviewed:  ECHOCARDIOGRAM:  < from: Transesophageal Echocardiogram (05.15.21 @ 13:36) >   Mild (1+) mitral regurgitation is present.   Trace tricuspid valve regurgitation is present.   Mild aortic sclerosis is present with normal valvular opening.   Normal appearing pulmonic valve structure and function.   The left atrium is mildly dilated.   The left ventricle is normal in size, wall thickness, wall motion and   contractility.   Normal appearing right ventricle structure and function.   Normal appearing right atrium.   No intracardiac masses, thrombi or vegetations were seen.   Mild atheroscleorsis of the thoracic aorta.   No PFO/ASD.  < end of copied text >    CULTURES: Blood, Urine: Moderate (05-14 @ 14:05)    Additional results/Imaging: MRI brain 5/14/21: Multiple left small acute/subacute infarcts as described above. No acute intracranial hemorrhage.    CTH, CTA H/N 5/13/21:    1.   Right carotid system:  No hemodynamically significant stenosis.    2.   Left carotid system:  No hemodynamically significant stenosis.    3.   Intracranial circulation:  No significant vascular lesion.    4.   Brain:  moderate periventricular white matter ischemia. Old lacunar infarction seen in the LEFT basal ganglia. Old cortical infarction seen in the RIGHT parietal convexity.    5. Perfusion: No acute infarct. No hypoperfused tissue.    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    #Multiple embolic CVA  - Expressive aphasia improving  - ASA, Plavix (NEW), high dose statin (increased this admission)  - Seen by SLP and PT  - S/p FABIO on 5/17 >> neg, no PFO,  No intracardiac masses, thrombi or vegetations were seen.  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 Need to clarify which read correct. (of  note, MRA was without contrast)  - S/P loop recorder placement ON 5/17  - f/u EP outpt.    #HTN  - Monitor BP, stable    # Sinus Pause  - 5/14: 3.42 sec and 3.46 sec pause overnight but  none since then. not on AV gama blocking agents.  - s/p loop recorder placement - f/u EP    # h/o RA not on Humira due to cost    # Nausea- IMPROVING  - PRN Zofran     # DVT ppx - SQ Heparin     FULL CODE  #Wife is HCP -- updated 5/17, 5/18 AM  -also updated daughter, Starla, [phone] on 5/14 and 5/15       Chart and meds reviewed.  Patient seen and examined.    CC: slurred speech    Subjective: had 2 pauses on tele on 3/14, one for 3.42 sec and another for 3.46 sec. none since then. pt denies any complaints. no chest pain or dyspena    All 10 systems reviewed and found to be negative with the exception of what has been described above.    MEDICATIONS  (STANDING):  aspirin enteric coated 81 milliGRAM(s) Oral daily  atorvastatin 80 milliGRAM(s) Oral at bedtime  clopidogrel Tablet 75 milliGRAM(s) Oral daily  pantoprazole    Tablet 40 milliGRAM(s) Oral before breakfast    MEDICATIONS  (PRN):  acetaminophen   Tablet .. 650 milliGRAM(s) Oral every 6 hours PRN Temp greater or equal to 38C (100.4F), Mild Pain (1 - 3)  ALPRAZolam 0.5 milliGRAM(s) Oral at bedtime PRN insomnia  ondansetron Injectable 4 milliGRAM(s) IV Push every 6 hours PRN Nausea and/or Vomiting    Vital Signs Last 24 Hrs  T(C): 36.5 (18 May 2021 08:45), Max: 36.8 (17 May 2021 19:42)  T(F): 97.7 (18 May 2021 08:45), Max: 98.2 (17 May 2021 19:42)  HR: 87 (18 May 2021 08:45) (81 - 90)  BP: 101/53 (18 May 2021 08:45) (101/53 - 154/67)  BP(mean): --  RR: 18 (18 May 2021 08:45) (18 - 18)  SpO2: 98% (18 May 2021 08:45) (96% - 99%)    PHYSICAL EXAM:  General: NAD  HEENT:  pupils equal and reactive, EOMI, no oropharyngeal lesions, erythema, exudates, oral thrush  NECK:   supple, no carotid bruits, no palpable lymph nodes, no thyromegaly  CV:  +S1, +S2, regular, no murmurs or rubs  RESP:   lungs clear to auscultation bilaterally, no wheezing, rales, rhonchi, good air entry bilaterally  BREAST:  not examined  GI:  abdomen soft, non-tender, non-distended, normal BS, no bruits, no abdominal masses, no palpable masses  MSK:   normal muscle tone, no atrophy, no rigidity, no contractions  EXT:  no clubbing, no cyanosis, no edema, no calf pain, swelling or erythema. deformity of L hand digits  VASCULAR:  pulses equal and symmetric in the upper and lower extremities  NEURO:  AAOX3, no focal neurological deficits, follows all commands, able to move extremities spontaneously  SKIN:  no ulcers, lesions or rashes    LABS: All Labs Reviewed:  ECHOCARDIOGRAM:  < from: Transesophageal Echocardiogram (05.15.21 @ 13:36) >   Mild (1+) mitral regurgitation is present.   Trace tricuspid valve regurgitation is present.   Mild aortic sclerosis is present with normal valvular opening.   Normal appearing pulmonic valve structure and function.   The left atrium is mildly dilated.   The left ventricle is normal in size, wall thickness, wall motion and   contractility.   Normal appearing right ventricle structure and function.   Normal appearing right atrium.   No intracardiac masses, thrombi or vegetations were seen.   Mild atheroscleorsis of the thoracic aorta.   No PFO/ASD.  < end of copied text >    CULTURES: Blood, Urine: Moderate (05-14 @ 14:05)    Additional results/Imaging: MRI brain 5/14/21: Multiple left small acute/subacute infarcts as described above. No acute intracranial hemorrhage.    CTH, CTA H/N 5/13/21:    1.   Right carotid system:  No hemodynamically significant stenosis.    2.   Left carotid system:  No hemodynamically significant stenosis.    3.   Intracranial circulation:  No significant vascular lesion.    4.   Brain:  moderate periventricular white matter ischemia. Old lacunar infarction seen in the LEFT basal ganglia. Old cortical infarction seen in the RIGHT parietal convexity.    5. Perfusion: No acute infarct. No hypoperfused tissue.    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    #Multiple embolic CVA  - Expressive aphasia improving  - ASA, Plavix (NEW), high dose statin (increased this admission)  - Seen by SLP and PT  - S/p FABIO on 5/17 >> neg, no PFO,  No intracardiac masses, thrombi or vegetations were seen.  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 Need to clarify which read correct. (of  note, MRA was without contrast)  - S/P loop recorder placement ON 5/17  - f/u EP outpt.    #HTN  - Monitor BP, stable    # Sinus Pause  - 5/14: 3.42 sec and 3.46 sec pause overnight but  none since then. not on AV gama blocking agents.  - s/p loop recorder placement - f/u EP    # h/o RA not on Humira due to cost    # Nausea- IMPROVING  - PRN Zofran     # DVT ppx - SQ Heparin     FULL CODE  #Wife is HCP -- updated 5/17, 5/18 AM  -also updated daughterStarla, [phone] on 5/14 and 5/15

## 2021-05-18 NOTE — DISCHARGE NOTE PROVIDER - HOSPITAL COURSE
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    #Multiple embolic CVA  - Expressive aphasia improving  - ASA, Plavix (NEW), high dose statin (increased this admission)  - Seen by SLP and PT  - S/p FABIO on 5/17 >> neg, no PFO,  No intracardiac masses, thrombi or vegetations were seen.  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 Need to clarify which read correct. (of  note, MRA was without contrast)  - S/P loop recorder placement ON 5/17  - f/u EP outpt.    #HTN  - Monitor BP, stable    # Sinus Pause  - 5/14: 3.42 sec and 3.46 sec pause overnight but  none since then. not on AV gama blocking agents.  - s/p loop recorder placement - f/u EP    # h/o RA not on Humira due to cost    # Nausea- IMPROVING  - PRN Zofran     # DVT ppx - SQ Heparin     FULL CODE  #Wife is HCP -- updated 5/17, 5/18 AM  -also updated daughter, Starla, [phone] on 5/14 and 5/15    Dispo: inpatient.     Dispo: discharge to HOME w/ home care/PT in stable condition    Final diagnosis, treatment plan, and follow-up recommendations were discussed and explained to the patient. The patient was given an opportunity to ask questions concerning the diagnosis and treatment plan. The patient acknowledged understanding of the diagnosis, treatment, and follow-up recommendations. The patient was advised to seek urgent care upon discharge if worsening symptoms develop prior to scheduled follow-up. Time spent on discharge included time with the patient, and also coordinating discharge care as outlined below.    Total time spent: 50 min

## 2021-05-18 NOTE — DISCHARGE NOTE PROVIDER - CARE PROVIDER_API CALL
Lashawn España  CARDIOVASCULAR DISEASE  [address] [place] [address]  Phone: [phone]  Fax: [phone]  Follow Up Time:    Lashawn España  CARDIOVASCULAR DISEASE  [address] [place] [address]  Phone: [phone]  Fax: [phone]  Follow Up Time:     Tyron Sandoval)  Neurology  [address] [facility], [address]  [place] [address]  Phone: [phone]  Fax: [phone]  Follow Up Time:     Edd Moreno)  Cardiac Electrophysiology; Cardiovascular Disease  [address] [place] [address]  Phone: [phone]  Fax: [phone]  Follow Up Time:

## 2021-05-18 NOTE — DISCHARGE NOTE PROVIDER - NSDCFUSCHEDAPPT_GEN_ALL_CORE_FT
JOZEF SOLORZANO ; 06/01/2021 ; NPP CardioElectro [address] JOZEF Michelle ; 06/21/2021 ; NPP Neurology [address]

## 2021-05-18 NOTE — PROGRESS NOTE ADULT - SUBJECTIVE AND OBJECTIVE BOX
Patient is a 87y old  Male who presents with a chief complaint of Slurred Speech.       HPI:  85 YO M with a PMH of RA, Diverticulitis, presents to hospital for Slurred Speech that began on 5/13 since 1AM, witnessed by pt's wife. Pt endorses difficulty word finding at this time.  Per pt is started with a dull HA and he is still having a mild headache.    5/17-   Pt seen this am.  He denies any CP or SOB.  Just woke up the patient and he appears confused.   Spoke to the overnight nurse and pt was AAO-3.  No overnight issues.       5/18- s/p FABIO and loop recorder implant yesterday.  Pt denies any symptoms this am .   PAST MEDICAL & SURGICAL HISTORY:  Rheumatoid arthritis    Diverticulitis    History of hernia repair    History of appendectomy        MEDICATIONS  (STANDING):  aspirin enteric coated 81 milliGRAM(s) Oral daily  atorvastatin 80 milliGRAM(s) Oral at bedtime  heparin   Injectable 5000 Unit(s) SubCutaneous every 12 hours  pantoprazole    Tablet 40 milliGRAM(s) Oral before breakfast    MEDICATIONS  (PRN):  ALPRAZolam 0.5 milliGRAM(s) Oral at bedtime PRN insomnia  dicyclomine 20 milliGRAM(s) Oral four times a day before meals PRN cramps abdominal  traMADol 50 milliGRAM(s) Oral every 4 hours PRN Moderate Pain (4 - 6)      FAMILY HISTORY:      SOCIAL HISTORY: no recent smoking     REVIEW OF SYSTEMS:  CONSTITUTIONAL:    No fatigue, malaise, lethargy.  No fever or chills.  RESPIRATORY:  No cough.  No wheeze.  No hemoptysis.  No shortness of breath.  CARDIOVASCULAR:  No chest pains.  No palpitations. No shortness of breath, No orthopnea or PND.  GASTROINTESTINAL:  No abdominal pain.  No nausea or vomiting.    GENITOURINARY:    No hematuria.    MUSCULOSKELETAL:  c/o musculoskeletal pain.  No joint swelling.  No arthritis.  NEUROLOGICAL:  No tingling or numbness or weakness.  c/o slurred speech.   PSYCHIATRIC:  c/o confusion  SKIN:  No rashes.           ICU Vital Signs Last 24 Hrs  T(C): 36.8 (17 May 2021 19:42), Max: 36.8 (17 May 2021 19:42)  T(F): 98.2 (17 May 2021 19:42), Max: 98.2 (17 May 2021 19:42)  HR: 86 (17 May 2021 19:42) (64 - 90)  BP: 133/59 (17 May 2021 19:42) (108/77 - 175/75)  BP(mean): --  ABP: --  ABP(mean): --  RR: 18 (17 May 2021 16:15) (16 - 98)  SpO2: 96% (17 May 2021 19:42) (96% - 99%)      PHYSICAL EXAM-    Constitutional: elderly male in no acute distress     Head: Head is normocephalic and atraumatic.      Neck:  No JVD.     Cardiovascular: Regular rate and rhythm without S3, S4. No murmurs or rubs are appreciated.      Respiratory: Breath sounds are normal. No rales. No wheezing.    Abdomen: Soft, nontender, nondistended with positive bowel sounds.      Extremity: No tenderness. No  pitting edema     Neurologic: The patient is alert and oriented- 1.     Skin: No rash, no obvious lesions noted.      Psychiatric: The patient appears to be emotionally stable.      INTERPRETATION OF TELEMETRY: sinus rythm    ECG: Sinus rythm.     I&O's Detail      LABS:                             05-14 Chol 119 LDL -- HDL 47 Trig 96    PTT - ( 13 May 2021 15:21 )  PTT:29.9 sec    I&O's Summary    BNP  RADIOLOGY & ADDITIONAL STUDIES:  < from: CT Angio Neck w/ IV Cont (05.13.21 @ 15:31) >  IMPRESSION:        1.   Right carotid system:  No hemodynamically significant stenosis.        2.   Left carotid system:  No hemodynamically significant stenosis.        3.   Intracranial circulation:  No significant vascular lesion.        4.   Brain:  moderate periventricular white matter ischemia. Old lacunar infarction seen in the LEFT basalganglia. Old cortical infarction seen in the RIGHT parietal convexity.        5. Perfusion: No acute infarct. No hypoperfused tissue.    Critical value:  I discussed the finding of this report with Dr. Cagle at 3:30 PM on 05/13/2021.  Critical value policy of the hospital was followed.  Read back and confirmation of receipt of this communication was performed.  This verbal communication supplements the text report of this document.            AMADOR ARMAS MD; Attending Radiologist  This document has been electronically signed. May 13 2021  3:43PM    < end of copied text >  < from: TTE Echo Complete w/o Contrast w/ Doppler (03.08.21 @ 08:32) >     Impression     Summary     Mild to Moderate mitral regurgitation is present.   Fibrocalcific changes noted to the mitral valve leaflets with preserved   leaflet excursion.   Mild mitral annular calcification is present.   Mild to Moderate mitral regurgitation is present.   EA reversal of the mitral inflow consistent with reduced compliance of the   left ventricle   There are fibrocalcific changes noted to the aortic valve leaflets with   restriction in leaflet excursion. Transaortic gradients are slightly   elevated but do not meet the criteria for aortic stenosis. No aortic   insufficiency noted.   Trace to mild tricuspid valve regurgitation is present.   Normal appearing pulmonic valve structure and function.   Normal appearing left atrium.   The left ventricle is normal in size, wall thickness, wall motion and   contractility.   Estimated left ventricular ejection fraction is 55-60 %.     Signature     ----------------------------------------------------------------   Electronically signed by Laney Clark MD(Interpreting   physician) on 03/08/2021 05:31 PM   ----------------------------------------------------------------    < end of copied text >  c< from: MR Head No Cont (05.14.21 @ 15:50) >      IMPRESSION:  Multiple left small acute/subacute infarcts as described above    No acute intracranial hemorrhage            CITLALLI PHILLIPS MD; Attending Radiologist  This document has been electronically signed. May 14 2021  4:52PM    < end of copied text >  < from: Transesophageal Echocardiogram (05.15.21 @ 13:36) >   Summary     Mild (1+) mitral regurgitation is present.   Trace tricuspid valve regurgitation is present.   Mild aortic sclerosis is present with normal valvular opening.   Normal appearing pulmonic valve structure and function.   The left atrium is mildly dilated.   The left ventricle is normal in size, wall thickness, wall motion and   contractility.   Normal appearing right ventricle structure and function.   Normal appearing right atrium.   No intracardiac masses, thrombi or vegetations were seen.   Mild atheroscleorsis of the thoracic aorta.   No PFO/ASD.     Signature     ----------------------------------------------------------------   Electronically signed by Lashawn España MD(Interpreting   physician) on 05/17/2021 10:16 AM    < end of copied text >

## 2021-05-18 NOTE — PROGRESS NOTE ADULT - REASON FOR ADMISSION
Slurred Speech

## 2021-05-18 NOTE — DISCHARGE NOTE PROVIDER - NSDCCAREPROVSEEN_GEN_ALL_CORE_FT
Brigid Linn (Nurse Practitioner)  Jodi Jara (Hospitalist)  Praful Pinedo (Neurologist)  Lashawn España (Cardiologist)

## 2021-05-18 NOTE — CDI QUERY NOTE - NSCDIOTHERTXTBX_GEN_ALL_CORE_HH
This patient under went a procedure for the insertion of an implantable loop recorder. There is no mention of the site or approach for this in the Procedure Note and there is not any procedure notes in [facility]:    Procedure Note General 5-17-21 @ 16:54  · Procedure Name: implantation of Implantable cardiac monitor  · Anatomic Location: Left  PROCEDURE SELECTOR:   Procedure Selector:  PROCEDURES:  Insertion, loop recorder 17-May-2021 16:57:33  Linda Roque.    Can you please specify the site and approach of the implantable loop recorder?  A) Chest (Open or Percutaneous) Please specify which approach.  B) Abdomen (Open or Percutaneous) Please specify which approach.  C) Other site and other approach, please specify:  D) Unable to determine.

## 2021-05-18 NOTE — DISCHARGE NOTE PROVIDER - INSTRUCTIONS
Choose foods that are low in salt - examples include: fresh meats, poultry, fish, eggs, milk and yogurt. Avoid packaged/processed foods and excessive table salt use.

## 2021-05-18 NOTE — DISCHARGE NOTE PROVIDER - PROVIDER TOKENS
PROVIDER:[TOKEN:[[id number]]] PROVIDER:[TOKEN:[[id number]]],PROVIDER:[TOKEN:[[id number]]],PROVIDER:[TOKEN:[[id number]]]

## 2021-05-24 ENCOUNTER — INPATIENT (INPATIENT)
Facility: HOSPITAL | Age: 86
LOS: 10 days | Discharge: NURSING, REHAB OR LONG-TERM CARE | DRG: 871 | End: 2021-06-04
Attending: INTERNAL MEDICINE | Admitting: SURGERY
Payer: MEDICARE

## 2021-05-24 VITALS
RESPIRATION RATE: 15 BRPM | HEIGHT: 73 IN | OXYGEN SATURATION: 96 % | TEMPERATURE: 98 F | WEIGHT: 179.9 LBS | HEART RATE: 125 BPM | SYSTOLIC BLOOD PRESSURE: 150 MMHG | DIASTOLIC BLOOD PRESSURE: 85 MMHG

## 2021-05-24 DIAGNOSIS — R00.1 BRADYCARDIA, UNSPECIFIED: ICD-10-CM

## 2021-05-24 DIAGNOSIS — R47.01 APHASIA: ICD-10-CM

## 2021-05-24 DIAGNOSIS — A41.9 SEPSIS, UNSPECIFIED ORGANISM: ICD-10-CM

## 2021-05-24 DIAGNOSIS — Z98.890 OTHER SPECIFIED POSTPROCEDURAL STATES: Chronic | ICD-10-CM

## 2021-05-24 DIAGNOSIS — R29.701 NIHSS SCORE 1: ICD-10-CM

## 2021-05-24 DIAGNOSIS — I63.412 CEREBRAL INFARCTION DUE TO EMBOLISM OF LEFT MIDDLE CEREBRAL ARTERY: ICD-10-CM

## 2021-05-24 DIAGNOSIS — M06.9 RHEUMATOID ARTHRITIS, UNSPECIFIED: ICD-10-CM

## 2021-05-24 DIAGNOSIS — I45.5 OTHER SPECIFIED HEART BLOCK: ICD-10-CM

## 2021-05-24 DIAGNOSIS — I10 ESSENTIAL (PRIMARY) HYPERTENSION: ICD-10-CM

## 2021-05-24 DIAGNOSIS — E78.5 HYPERLIPIDEMIA, UNSPECIFIED: ICD-10-CM

## 2021-05-24 DIAGNOSIS — Z79.82 LONG TERM (CURRENT) USE OF ASPIRIN: ICD-10-CM

## 2021-05-24 DIAGNOSIS — Z88.0 ALLERGY STATUS TO PENICILLIN: ICD-10-CM

## 2021-05-24 DIAGNOSIS — Z87.891 PERSONAL HISTORY OF NICOTINE DEPENDENCE: ICD-10-CM

## 2021-05-24 DIAGNOSIS — R11.0 NAUSEA: ICD-10-CM

## 2021-05-24 DIAGNOSIS — Z90.49 ACQUIRED ABSENCE OF OTHER SPECIFIED PARTS OF DIGESTIVE TRACT: Chronic | ICD-10-CM

## 2021-05-24 LAB
ALBUMIN SERPL ELPH-MCNC: 3.6 G/DL — SIGNIFICANT CHANGE UP (ref 3.3–5)
ALP SERPL-CCNC: 117 U/L — SIGNIFICANT CHANGE UP (ref 40–120)
ALT FLD-CCNC: 27 U/L — SIGNIFICANT CHANGE UP (ref 12–78)
ANION GAP SERPL CALC-SCNC: 8 MMOL/L — SIGNIFICANT CHANGE UP (ref 5–17)
ANION GAP SERPL CALC-SCNC: 9 MMOL/L — SIGNIFICANT CHANGE UP (ref 5–17)
APPEARANCE UR: CLEAR — SIGNIFICANT CHANGE UP
APTT BLD: 24.1 SEC — LOW (ref 27.5–35.5)
AST SERPL-CCNC: 22 U/L — SIGNIFICANT CHANGE UP (ref 15–37)
BASOPHILS # BLD AUTO: 0 K/UL — SIGNIFICANT CHANGE UP (ref 0–0.2)
BASOPHILS NFR BLD AUTO: 0 % — SIGNIFICANT CHANGE UP (ref 0–2)
BILIRUB SERPL-MCNC: 1.2 MG/DL — SIGNIFICANT CHANGE UP (ref 0.2–1.2)
BILIRUB UR-MCNC: NEGATIVE — SIGNIFICANT CHANGE UP
BUN SERPL-MCNC: 16 MG/DL — SIGNIFICANT CHANGE UP (ref 7–23)
BUN SERPL-MCNC: 16 MG/DL — SIGNIFICANT CHANGE UP (ref 7–23)
CALCIUM SERPL-MCNC: 8.3 MG/DL — LOW (ref 8.5–10.1)
CALCIUM SERPL-MCNC: 9.7 MG/DL — SIGNIFICANT CHANGE UP (ref 8.5–10.1)
CHLORIDE SERPL-SCNC: 112 MMOL/L — HIGH (ref 96–108)
CHLORIDE SERPL-SCNC: 116 MMOL/L — HIGH (ref 96–108)
CO2 SERPL-SCNC: 19 MMOL/L — LOW (ref 22–31)
CO2 SERPL-SCNC: 21 MMOL/L — LOW (ref 22–31)
COLOR SPEC: YELLOW — SIGNIFICANT CHANGE UP
CREAT SERPL-MCNC: 1 MG/DL — SIGNIFICANT CHANGE UP (ref 0.5–1.3)
CREAT SERPL-MCNC: 1.21 MG/DL — SIGNIFICANT CHANGE UP (ref 0.5–1.3)
DIFF PNL FLD: ABNORMAL
EOSINOPHIL # BLD AUTO: 0 K/UL — SIGNIFICANT CHANGE UP (ref 0–0.5)
EOSINOPHIL NFR BLD AUTO: 0 % — SIGNIFICANT CHANGE UP (ref 0–6)
GLUCOSE SERPL-MCNC: 127 MG/DL — HIGH (ref 70–99)
GLUCOSE SERPL-MCNC: 149 MG/DL — HIGH (ref 70–99)
GLUCOSE UR QL: 100 MG/DL
HCT VFR BLD CALC: 43.9 % — SIGNIFICANT CHANGE UP (ref 39–50)
HGB BLD-MCNC: 14.5 G/DL — SIGNIFICANT CHANGE UP (ref 13–17)
INR BLD: 1.11 RATIO — SIGNIFICANT CHANGE UP (ref 0.88–1.16)
KETONES UR-MCNC: NEGATIVE — SIGNIFICANT CHANGE UP
LACTATE SERPL-SCNC: 3.5 MMOL/L — HIGH (ref 0.7–2)
LEUKOCYTE ESTERASE UR-ACNC: NEGATIVE — SIGNIFICANT CHANGE UP
LIDOCAIN IGE QN: 137 U/L — SIGNIFICANT CHANGE UP (ref 73–393)
LYMPHOCYTES # BLD AUTO: 0.37 K/UL — LOW (ref 1–3.3)
LYMPHOCYTES # BLD AUTO: 19 % — SIGNIFICANT CHANGE UP (ref 13–44)
MCHC RBC-ENTMCNC: 29.9 PG — SIGNIFICANT CHANGE UP (ref 27–34)
MCHC RBC-ENTMCNC: 33 GM/DL — SIGNIFICANT CHANGE UP (ref 32–36)
MCV RBC AUTO: 90.5 FL — SIGNIFICANT CHANGE UP (ref 80–100)
MONOCYTES # BLD AUTO: 0.1 K/UL — SIGNIFICANT CHANGE UP (ref 0–0.9)
MONOCYTES NFR BLD AUTO: 5 % — SIGNIFICANT CHANGE UP (ref 2–14)
NEUTROPHILS # BLD AUTO: 1.47 K/UL — LOW (ref 1.8–7.4)
NEUTROPHILS NFR BLD AUTO: 76 % — SIGNIFICANT CHANGE UP (ref 43–77)
NITRITE UR-MCNC: NEGATIVE — SIGNIFICANT CHANGE UP
NRBC # BLD: SIGNIFICANT CHANGE UP /100 WBCS (ref 0–0)
PH UR: 5 — SIGNIFICANT CHANGE UP (ref 5–8)
PLATELET # BLD AUTO: 153 K/UL — SIGNIFICANT CHANGE UP (ref 150–400)
POTASSIUM SERPL-MCNC: 3.2 MMOL/L — LOW (ref 3.5–5.3)
POTASSIUM SERPL-MCNC: 3.8 MMOL/L — SIGNIFICANT CHANGE UP (ref 3.5–5.3)
POTASSIUM SERPL-SCNC: 3.2 MMOL/L — LOW (ref 3.5–5.3)
POTASSIUM SERPL-SCNC: 3.8 MMOL/L — SIGNIFICANT CHANGE UP (ref 3.5–5.3)
PROT SERPL-MCNC: 7.4 GM/DL — SIGNIFICANT CHANGE UP (ref 6–8.3)
PROT UR-MCNC: 15 MG/DL
PROTHROM AB SERPL-ACNC: 12.8 SEC — SIGNIFICANT CHANGE UP (ref 10.6–13.6)
RAPID RVP RESULT: SIGNIFICANT CHANGE UP
RBC # BLD: 4.85 M/UL — SIGNIFICANT CHANGE UP (ref 4.2–5.8)
RBC # FLD: 13 % — SIGNIFICANT CHANGE UP (ref 10.3–14.5)
SARS-COV-2 RNA SPEC QL NAA+PROBE: SIGNIFICANT CHANGE UP
SODIUM SERPL-SCNC: 142 MMOL/L — SIGNIFICANT CHANGE UP (ref 135–145)
SODIUM SERPL-SCNC: 143 MMOL/L — SIGNIFICANT CHANGE UP (ref 135–145)
SP GR SPEC: 1.01 — SIGNIFICANT CHANGE UP (ref 1.01–1.02)
UROBILINOGEN FLD QL: NEGATIVE MG/DL — SIGNIFICANT CHANGE UP
WBC # BLD: 1.93 K/UL — LOW (ref 3.8–10.5)
WBC # FLD AUTO: 1.93 K/UL — LOW (ref 3.8–10.5)

## 2021-05-24 PROCEDURE — 93320 DOPPLER ECHO COMPLETE: CPT

## 2021-05-24 PROCEDURE — 70450 CT HEAD/BRAIN W/O DYE: CPT | Mod: 26,MA,59

## 2021-05-24 PROCEDURE — 83735 ASSAY OF MAGNESIUM: CPT

## 2021-05-24 PROCEDURE — 92526 ORAL FUNCTION THERAPY: CPT | Mod: GN

## 2021-05-24 PROCEDURE — 84484 ASSAY OF TROPONIN QUANT: CPT

## 2021-05-24 PROCEDURE — 93325 DOPPLER ECHO COLOR FLOW MAPG: CPT

## 2021-05-24 PROCEDURE — 74018 RADEX ABDOMEN 1 VIEW: CPT

## 2021-05-24 PROCEDURE — 83090 ASSAY OF HOMOCYSTEINE: CPT

## 2021-05-24 PROCEDURE — 99285 EMERGENCY DEPT VISIT HI MDM: CPT

## 2021-05-24 PROCEDURE — 97116 GAIT TRAINING THERAPY: CPT | Mod: GP

## 2021-05-24 PROCEDURE — 70496 CT ANGIOGRAPHY HEAD: CPT | Mod: 26,MA

## 2021-05-24 PROCEDURE — 70450 CT HEAD/BRAIN W/O DYE: CPT

## 2021-05-24 PROCEDURE — 82962 GLUCOSE BLOOD TEST: CPT

## 2021-05-24 PROCEDURE — 97530 THERAPEUTIC ACTIVITIES: CPT | Mod: GP

## 2021-05-24 PROCEDURE — 74177 CT ABD & PELVIS W/CONTRAST: CPT | Mod: 26,ME

## 2021-05-24 PROCEDURE — 83605 ASSAY OF LACTIC ACID: CPT

## 2021-05-24 PROCEDURE — 76700 US EXAM ABDOM COMPLETE: CPT

## 2021-05-24 PROCEDURE — 97162 PT EVAL MOD COMPLEX 30 MIN: CPT | Mod: GP

## 2021-05-24 PROCEDURE — 84100 ASSAY OF PHOSPHORUS: CPT

## 2021-05-24 PROCEDURE — 95700 EEG CONT REC W/VID EEG TECH: CPT

## 2021-05-24 PROCEDURE — 70498 CT ANGIOGRAPHY NECK: CPT | Mod: 26,MA

## 2021-05-24 PROCEDURE — 85027 COMPLETE CBC AUTOMATED: CPT

## 2021-05-24 PROCEDURE — 87086 URINE CULTURE/COLONY COUNT: CPT

## 2021-05-24 PROCEDURE — 80053 COMPREHEN METABOLIC PANEL: CPT

## 2021-05-24 PROCEDURE — 85025 COMPLETE CBC W/AUTO DIFF WBC: CPT

## 2021-05-24 PROCEDURE — 92523 SPEECH SOUND LANG COMPREHEN: CPT | Mod: GN

## 2021-05-24 PROCEDURE — 80048 BASIC METABOLIC PNL TOTAL CA: CPT

## 2021-05-24 PROCEDURE — 93312 ECHO TRANSESOPHAGEAL: CPT

## 2021-05-24 PROCEDURE — 97110 THERAPEUTIC EXERCISES: CPT | Mod: GP

## 2021-05-24 PROCEDURE — 86769 SARS-COV-2 COVID-19 ANTIBODY: CPT

## 2021-05-24 PROCEDURE — 95816 EEG AWAKE AND DROWSY: CPT

## 2021-05-24 PROCEDURE — 95714 VEEG EA 12-26 HR UNMNTR: CPT

## 2021-05-24 PROCEDURE — 83036 HEMOGLOBIN GLYCOSYLATED A1C: CPT

## 2021-05-24 PROCEDURE — 36415 COLL VENOUS BLD VENIPUNCTURE: CPT

## 2021-05-24 PROCEDURE — C9113: CPT

## 2021-05-24 PROCEDURE — G1004: CPT

## 2021-05-24 PROCEDURE — 86147 CARDIOLIPIN ANTIBODY EA IG: CPT

## 2021-05-24 PROCEDURE — 70551 MRI BRAIN STEM W/O DYE: CPT

## 2021-05-24 PROCEDURE — 99291 CRITICAL CARE FIRST HOUR: CPT

## 2021-05-24 PROCEDURE — 81001 URINALYSIS AUTO W/SCOPE: CPT

## 2021-05-24 PROCEDURE — 71045 X-RAY EXAM CHEST 1 VIEW: CPT

## 2021-05-24 PROCEDURE — 87635 SARS-COV-2 COVID-19 AMP PRB: CPT

## 2021-05-24 PROCEDURE — 0042T: CPT

## 2021-05-24 PROCEDURE — 71045 X-RAY EXAM CHEST 1 VIEW: CPT | Mod: 26

## 2021-05-24 PROCEDURE — 93010 ELECTROCARDIOGRAM REPORT: CPT

## 2021-05-24 PROCEDURE — 92610 EVALUATE SWALLOWING FUNCTION: CPT | Mod: GN

## 2021-05-24 PROCEDURE — 87040 BLOOD CULTURE FOR BACTERIA: CPT

## 2021-05-24 PROCEDURE — 92507 TX SP LANG VOICE COMM INDIV: CPT | Mod: GN

## 2021-05-24 PROCEDURE — 80061 LIPID PANEL: CPT

## 2021-05-24 PROCEDURE — 86146 BETA-2 GLYCOPROTEIN ANTIBODY: CPT

## 2021-05-24 RX ORDER — MORPHINE SULFATE 50 MG/1
4 CAPSULE, EXTENDED RELEASE ORAL ONCE
Refills: 0 | Status: DISCONTINUED | OUTPATIENT
Start: 2021-05-24 | End: 2021-05-24

## 2021-05-24 RX ORDER — SODIUM CHLORIDE 9 MG/ML
1500 INJECTION INTRAMUSCULAR; INTRAVENOUS; SUBCUTANEOUS ONCE
Refills: 0 | Status: COMPLETED | OUTPATIENT
Start: 2021-05-24 | End: 2021-05-24

## 2021-05-24 RX ORDER — SODIUM CHLORIDE 9 MG/ML
1000 INJECTION INTRAMUSCULAR; INTRAVENOUS; SUBCUTANEOUS ONCE
Refills: 0 | Status: COMPLETED | OUTPATIENT
Start: 2021-05-24 | End: 2021-05-24

## 2021-05-24 RX ORDER — CEFEPIME 1 G/1
1000 INJECTION, POWDER, FOR SOLUTION INTRAMUSCULAR; INTRAVENOUS ONCE
Refills: 0 | Status: DISCONTINUED | OUTPATIENT
Start: 2021-05-24 | End: 2021-05-24

## 2021-05-24 RX ORDER — ONDANSETRON 8 MG/1
4 TABLET, FILM COATED ORAL ONCE
Refills: 0 | Status: COMPLETED | OUTPATIENT
Start: 2021-05-24 | End: 2021-05-24

## 2021-05-24 RX ORDER — OMEPRAZOLE 10 MG/1
1 CAPSULE, DELAYED RELEASE ORAL
Qty: 0 | Refills: 0 | DISCHARGE

## 2021-05-24 RX ORDER — ASPIRIN/CALCIUM CARB/MAGNESIUM 324 MG
300 TABLET ORAL DAILY
Refills: 0 | Status: DISCONTINUED | OUTPATIENT
Start: 2021-05-24 | End: 2021-05-25

## 2021-05-24 RX ORDER — PANTOPRAZOLE SODIUM 20 MG/1
40 TABLET, DELAYED RELEASE ORAL DAILY
Refills: 0 | Status: DISCONTINUED | OUTPATIENT
Start: 2021-05-24 | End: 2021-05-29

## 2021-05-24 RX ORDER — ALPRAZOLAM 0.25 MG
1 TABLET ORAL
Qty: 0 | Refills: 0 | DISCHARGE

## 2021-05-24 RX ORDER — ASPIRIN/CALCIUM CARB/MAGNESIUM 324 MG
1 TABLET ORAL
Qty: 0 | Refills: 0 | DISCHARGE

## 2021-05-24 RX ORDER — SODIUM CHLORIDE 9 MG/ML
1000 INJECTION, SOLUTION INTRAVENOUS ONCE
Refills: 0 | Status: COMPLETED | OUTPATIENT
Start: 2021-05-24 | End: 2021-05-24

## 2021-05-24 RX ORDER — VANCOMYCIN HCL 1 G
1000 VIAL (EA) INTRAVENOUS ONCE
Refills: 0 | Status: DISCONTINUED | OUTPATIENT
Start: 2021-05-24 | End: 2021-05-24

## 2021-05-24 RX ORDER — HEPARIN SODIUM 5000 [USP'U]/ML
5000 INJECTION INTRAVENOUS; SUBCUTANEOUS EVERY 8 HOURS
Refills: 0 | Status: DISCONTINUED | OUTPATIENT
Start: 2021-05-24 | End: 2021-06-04

## 2021-05-24 RX ORDER — ATORVASTATIN CALCIUM 80 MG/1
80 TABLET, FILM COATED ORAL AT BEDTIME
Refills: 0 | Status: DISCONTINUED | OUTPATIENT
Start: 2021-05-24 | End: 2021-06-04

## 2021-05-24 RX ORDER — ACETAMINOPHEN 500 MG
1000 TABLET ORAL ONCE
Refills: 0 | Status: COMPLETED | OUTPATIENT
Start: 2021-05-24 | End: 2021-05-24

## 2021-05-24 RX ORDER — VANCOMYCIN HCL 1 G
1250 VIAL (EA) INTRAVENOUS ONCE
Refills: 0 | Status: COMPLETED | OUTPATIENT
Start: 2021-05-24 | End: 2021-05-24

## 2021-05-24 RX ORDER — CEFEPIME 1 G/1
1000 INJECTION, POWDER, FOR SOLUTION INTRAMUSCULAR; INTRAVENOUS ONCE
Refills: 0 | Status: COMPLETED | OUTPATIENT
Start: 2021-05-24 | End: 2021-05-24

## 2021-05-24 RX ORDER — CEFEPIME 1 G/1
2000 INJECTION, POWDER, FOR SOLUTION INTRAMUSCULAR; INTRAVENOUS EVERY 24 HOURS
Refills: 0 | Status: DISCONTINUED | OUTPATIENT
Start: 2021-05-25 | End: 2021-05-27

## 2021-05-24 RX ORDER — CHLORHEXIDINE GLUCONATE 213 G/1000ML
1 SOLUTION TOPICAL
Refills: 0 | Status: DISCONTINUED | OUTPATIENT
Start: 2021-05-24 | End: 2021-06-04

## 2021-05-24 RX ORDER — POTASSIUM CHLORIDE 20 MEQ
10 PACKET (EA) ORAL
Refills: 0 | Status: COMPLETED | OUTPATIENT
Start: 2021-05-24 | End: 2021-05-25

## 2021-05-24 RX ADMIN — Medication 166.67 MILLIGRAM(S): at 18:00

## 2021-05-24 RX ADMIN — CEFEPIME 1000 MILLIGRAM(S): 1 INJECTION, POWDER, FOR SOLUTION INTRAMUSCULAR; INTRAVENOUS at 13:26

## 2021-05-24 RX ADMIN — Medication 100 MILLIEQUIVALENT(S): at 22:55

## 2021-05-24 RX ADMIN — Medication 400 MILLIGRAM(S): at 13:01

## 2021-05-24 RX ADMIN — SODIUM CHLORIDE 1000 MILLILITER(S): 9 INJECTION, SOLUTION INTRAVENOUS at 22:57

## 2021-05-24 RX ADMIN — SODIUM CHLORIDE 1000 MILLILITER(S): 9 INJECTION INTRAMUSCULAR; INTRAVENOUS; SUBCUTANEOUS at 13:00

## 2021-05-24 RX ADMIN — SODIUM CHLORIDE 1000 MILLILITER(S): 9 INJECTION INTRAMUSCULAR; INTRAVENOUS; SUBCUTANEOUS at 13:01

## 2021-05-24 RX ADMIN — SODIUM CHLORIDE 3000 MILLILITER(S): 9 INJECTION INTRAMUSCULAR; INTRAVENOUS; SUBCUTANEOUS at 13:26

## 2021-05-24 RX ADMIN — HEPARIN SODIUM 5000 UNIT(S): 5000 INJECTION INTRAVENOUS; SUBCUTANEOUS at 22:55

## 2021-05-24 RX ADMIN — SODIUM CHLORIDE 1500 MILLILITER(S): 9 INJECTION INTRAMUSCULAR; INTRAVENOUS; SUBCUTANEOUS at 13:15

## 2021-05-24 RX ADMIN — MORPHINE SULFATE 4 MILLIGRAM(S): 50 CAPSULE, EXTENDED RELEASE ORAL at 12:59

## 2021-05-24 RX ADMIN — ONDANSETRON 4 MILLIGRAM(S): 8 TABLET, FILM COATED ORAL at 13:00

## 2021-05-24 RX ADMIN — Medication 300 MILLIGRAM(S): at 18:30

## 2021-05-24 RX ADMIN — PANTOPRAZOLE SODIUM 40 MILLIGRAM(S): 20 TABLET, DELAYED RELEASE ORAL at 18:29

## 2021-05-24 NOTE — ED PROVIDER NOTE - CARE PLAN
Principal Discharge DX:	Sepsis   Principal Discharge DX:	Sepsis  Secondary Diagnosis:	Cerebrovascular accident (CVA), unspecified mechanism

## 2021-05-24 NOTE — ED ADULT NURSE NOTE - CHIEF COMPLAINT QUOTE
vomiting x30 min. hx of CVA, evaluated at triage for code stroke and is not a code stroke as per dr lion.

## 2021-05-24 NOTE — ED PROVIDER NOTE - OBJECTIVE STATEMENT
86 y/o male with PMHx of CVA x3, diverticulitis, RA, s/p appendectomy, s/p hernia repair presents to ED for abdominal pain, vomiting, shaking 30 minutes PTA. Pt states he got up to take a shower, began having left sided abdominal pain, vomiting, and trembling hands. Denies fever, chills, diarrhea, bloody stools, blood in vomit. No known sick contacts. Takes baby ASA daily. Allergies: penicillin.

## 2021-05-24 NOTE — ED PROVIDER NOTE - PROGRESS NOTE DETAILS
Gris Hensley for attending Dr. Mercado: Called to evaluate pt for stroke eval. Pt ARROYO, no slurred speech. Pt p/w vomiting, abdominal pain. Pt not a code stroke. Gris Hensley for attending Dr. Mercado: Called to pt's bedside at 12 23 pm. Pt now not moving right arm. Pt arouses when stimulated, however appears more somnolent than before. Pt not lifting right arm. Code stroke called. Attending Mercado, radiology called and ct head neg. Attending Mercado, pt with recent stroke  (may 3, 2021) and not tpa candidate. Gris Hensley for attending Dr. Mercado: Neuro evaluated pt. Pt not tpa candidate given. Given pt's possibly septic, not candidate for clot retrieval. Attending matt Mercado pt, wife and daughter on phone.  Pt is full code.  ICU paged for consult Gris Hensley for attending Dr. Mercado: ICU evaluated. Pt to be admitted to Dr. Martínez.

## 2021-05-24 NOTE — ED ADULT NURSE NOTE - NSIMPLEMENTINTERV_GEN_ALL_ED
Implemented All Fall with Harm Risk Interventions:  [place] to call system. Call bell, personal items and telephone within reach. Instruct patient to call for assistance. Room bathroom lighting operational. Non-slip footwear when patient is off stretcher. Physically safe environment: no spills, clutter or unnecessary equipment. Stretcher in lowest position, wheels locked, appropriate side rails in place. Provide visual cue, wrist band, yellow gown, etc. Monitor gait and stability. Monitor for mental status changes and reorient to person, place, and time. Review medications for side effects contributing to fall risk. Reinforce activity limits and safety measures with patient and family. Provide visual clues: red socks.

## 2021-05-24 NOTE — ED ADULT TRIAGE NOTE - CHIEF COMPLAINT QUOTE
vomiting x30 min. hx of CVA vomiting x30 min. hx of CVA, evaluated at triage for code stroke and is not a code stroke as per dr lion.

## 2021-05-24 NOTE — ED ADULT NURSE NOTE - NSFALLRSKHRMRISKTYPE_ED_ALL_ED
Patient Instructions   1. Call if temperature above 100.0°, chest pain, cough, abdominal pain, abdominal distension, weight gain of 2 pounds, nausea, vomiting, or flu-like symptoms  Clinic Phone # [phone]  2. Complete entire course of antibiotics as directed.   age(85 years old or older)

## 2021-05-24 NOTE — STROKE CODE NOTE - NSSTROKETPADOOR_OTHER_FT
becoming acutely hypoxic and recent infarcts in the last 2 months high risk of hemorrhagic conversion

## 2021-05-24 NOTE — H&P ADULT - HISTORY OF PRESENT ILLNESS
S:    Pt seen and examined  HD # 1  FULL CODE  PMHx s/p recent ischemic CVA's s/p d/c within past week (was ambulating with walker, speaking). RA  Pt BIBEMS from home for shaking, weakness, concern for infection  In ER, Pt became aphasic and R sided motor deficit; code stroke called, HCT without acute change, CTA w/o LVO, no tPA given  Pt now hypoxic on NRB  ICU consult for hypoxia    5/24: CT possible cholecystitis, UA with some bacteria On broad spectrum abx, fluid resuscitation ongoing.

## 2021-05-24 NOTE — STROKE CODE NOTE - CT READING
< from: CT Perfusion w/ Maps w/ IV Cont (05.24.21 @ 12:43) >1. The CT perfusion study suggests generalized hypoperfusion of both hemispheres as well as within the posterior fossa without a focal abnormality. No area of core infarction./No acute findings/Other

## 2021-05-24 NOTE — ED PROVIDER NOTE - NIH STROKE SCALE: 10. DYSARTHRIA, QM
DISPLAY PLAN FREE TEXT (1) Mild-to-moderate dysarthria; patient slurs at least some words and, at worst, can be understood with some difficulty

## 2021-05-24 NOTE — CONSULT NOTE ADULT - SUBJECTIVE AND OBJECTIVE BOX
Patient is a 87y old  Male who presents with a chief complaint of abdominal pain and projectile vomiting. In the ER with nurse present patient went from being able to move right arm to becoming more somnolent and unable to move his right side. Patient recently discharged 5/18 with stroke. On plavix and asa. Recent loop recorder placement. IN ER tachycardic to 135, wbc 1.93, lactate acid 3.5    PAST MEDICAL & SURGICAL HISTORY:  Rheumatoid arthritis    Diverticulitis    CVA    History of hernia repair    History of appendectomy    loop recorder     FAMILY HISTORY: non contributory         Social Hx:  former smoker, no drug or alcohol use, lives with family     MEDICATIONS  (STANDING):  cefepime  Injectable. 1000 milliGRAM(s) IV Push once  sodium chloride 0.9% Bolus 1500 milliLiter(s) IV Bolus once  plavix 75mg daily  asa 81mg daily  atorvastatin   Omeprazole       Allergies    penicillin     ROS: Pertinent positives in HPI, all other ROS were reviewed and are negative.      Vital Signs Last 24 Hrs  T(C): 36.9 (24 May 2021 11:22), Max: 36.9 (24 May 2021 11:22)  T(F): 98.4 (24 May 2021 11:22), Max: 98.4 (24 May 2021 11:22)  HR: 125 (24 May 2021 11:22) (125 - 125)  BP: 150/85 (24 May 2021 11:22) (150/85 - 150/85)  BP(mean): --  RR: 15 (24 May 2021 11:22) (15 - 15)  SpO2: 96% (24 May 2021 11:22) (96% - 96%)        Constitutional: lethargic but arousable. Oxygen level dropping to 88 on venti mask  HEENT: PERRLA, EOMI,   Neck: Supple.  Respiratory: Breath sounds are clear bilaterally  Cardiovascular: S1 and S2, tachycardia  Gastrointestinal: soft, nontender + vomiting in ER   Extremities:  no edema  Musculoskeletal: no joint swelling/tenderness, left 5th digit abnormality. see detailed exam  Skin: No rashes    Neurological exam:  Lethargic but arouses to voice, PERRL, EOMI, right facial droop. + expressive aphasia, able to state name and hospital but not speaking further. follows commands inconsistently  RUE 0/5, RLE 2/5 LUE 5/5 LLE 5/5 sensation mildly diminished on right.     NIHSS: 13      Labs:                        14.5   1.93  )-----------( 153      ( 24 May 2021 12:12 )             43.9     05-24    142  |  112<H>  |  16  ----------------------------<  149<H>  3.8   |  21<L>  |  1.21    Ca    9.7      24 May 2021 12:12    TPro  7.4  /  Alb  3.6  /  TBili  1.2  /  DBili  x   /  AST  22  /  ALT  27  /  AlkPhos  117  05-24 05-14 Chol 119 LDL -- HDL 47 Trig 96  PT/INR - ( 24 May 2021 12:12 )   PT: 12.8 sec;   INR: 1.11 ratio         PTT - ( 24 May 2021 12:12 )  PTT:24.1 sec    Lactate, Blood: 3.5    Troponin I, Serum: <0.015:COVID-19 Shaggy Domain Antibody Result: 2.37: Lipid Profile (05.14.21 @ 09:17)   Cholesterol, Serum: 119 mg/dL   Triglycerides, Serum: 96 mg/dL   HDL Cholesterol, Serum: 47 mg/dL   Non HDL Cholesterol: 72:   A1C with Estimated Average Glucose Result: 6.2:     RADIOLOGY:    < from: CT Perfusion w/ Maps w/ IV Cont (05.24.21 @ 12:43) >  IMPRESSION:      1. The CT perfusion study suggests generalized hypoperfusion of both hemispheres as well as within the posterior fossa without a focal abnormality. No area of core infarction. See above.  2. Moderate calcified plaque right carotid bifurcation but without significant stenosis. Focal stenosis left internal carotid 1 cm above the bifurcation with no tandemlesion noted.  3. Patent vertebrals.  4. Widely patent vasculature noted intracranially. Mild scattered atherosclerotic changes. No occlusion or tight stenosis suggested.    Follow-up MR imaging of the brain may be considered for further assessment    < end of copied text >  < from: CT Brain Stroke Protocol (05.24.21 @ 12:39) >    1)  chronic ischemic changes in both hemispheres with volume loss and involutional change. No acute infarct, hemorrhage, or space occupying lesion suggested.  2)  follow-up MR imaging may be considered for further assessment.    Dr. Mercado notified at 12:44 PM.      < from: CT Abdomen and Pelvis w/ IV Cont (05.24.21 @ 12:46) >    IMPRESSION:  Mildly distended gallbladder with mild gallbladder wall and common bile duct enhancement.    No gallbladder wall thickening or calcified gallstones; correlation is recommended with LFTs and a right upper quadrant ultrasound could be obtained for further evaluation.

## 2021-05-24 NOTE — H&P ADULT - NSHPPHYSICALEXAM_GEN_ALL_CORE
O:    Elderly M lying in bed  No JVD trachea midline  S1S2+  CTA B/L  Abd soft NTND  No LE edema/swelling noted  Aphasic, limited movement LUE to voice; not moving R side, occasionally tracks examiner  Skin otherwise intact throughout

## 2021-05-24 NOTE — SEPSIS NOTE - ASSESSMENT
87M  Recent ischemic CVA  Here with shaking, chills    Acute mental status change, concern for CVA--->code stroke, HCT negative, no tPA    Concern for sepsis, urine and gallbladder highest on ddx    P:    > 30 cc/kg IVF bolus (done)  Broad spectrum abx---> cefepime, vancomycin x 1  BC, UC  Lactate 3.2; trend s/p resuscitation  RUQ sono to better elucidate GB  f/u UOP, end points    Admit to ICU

## 2021-05-24 NOTE — ED ADULT NURSE NOTE - OBJECTIVE STATEMENT
pt. c/o LLQ pain since this morning, pt. states episodes of nausea/vomiting, RN at bedside pt. lethargic, 1200-pt. c/o LLQ pain since this morning, pt. states episodes of nausea/vomiting, RN at bedside pt. lethargic, no family at bedside. pt. states he feels nauseous, pt. vomited across room spilling on to floor, yellow vomit, airway clear, pt. moving all extremities, IV team called for difficulty gaining IV access.  mechanical issue with bedside monitor with BP cuff, new machine brought in by RN, WILLIE, 1223- when lifting pt. right arm for BP measurement right arm falls to bed, pt. unable to hold arm up, pt. able to lift left arm, ED MD called to beside for stroke eval, stroke called 1225, pt. brought to CT scan. Upon return pt. more lethargic and hypoxic, NC applied at 6LPM without improvement, Venti mask applied without improvement, NRB mask applied with improvement in oxygen status. Family at bedside states FULL CODE status. Rectal temp taken reveals fever. tylenol given, antibiotics given, IVF given. Hx of stroke

## 2021-05-24 NOTE — H&P ADULT - ASSESSMENT
A:    87M  HD # 1  FULL CODE    Here for:    1. Severe sepsis, 2/2  2. ? UTI, +/-  3. Acute cholecystitis  4. NAGMA/elevated lactic acidosis  5. Leukopenia (no neutropenia)  6. Acute CVA, ischemic  7. Acute hypoxic resp failure    This patient requires critical care for support of one or more vital organ systems with a high probability of imminent or life threatening deterioration in his/her condition    P:    Strongly suspect severe sepsis, unclear source, highest on DDx at this time UTI v cholecystitis v aspiration PNA  High risk for requiring intubation given poor mental status, hypoxia    Code stroke, neurology consult; deemed not a tPA given recent strokes. CTA w/o LVO. Pending MR. Neuro checks, post CVA care.  CVA stroke measures; lipid panel, HbA1c, PT/OT/SLP consult, statin, ASA  HD monitoring  On NRB, SpO2 near 100; deescalate as able. Possible aspiration PNA which has not showed on chest imaging at this time  NPO, SLP  Broad spectrum abx with cefepime 2g IV q24 (renal dosing CrCl 30-60), vancomycin 1.25g IV x 1. Most likely source UA, cholecystitis, maybe PNA. f/u Cx's.  s/p > 30 cc/kg IVF, vasopressors PRN.  Statin, ASA  VTE ppx SQH, PUD ppx protonix  f/u UOP  Trend LA  f/u AM labs, replete lytes PRN    Dispo: Accept to critical care.    Discussed with Dr Martínez    TOTAL CRITICAL CARE TIME: 50 minutes  (EXCLUSIVE of any non bundled procedures)    Note: This time spent INCLUDES time spent directly as this patient's bedside with evaluation, review of chart including review of laboratory and imaging studies, interpretation of vital signs and cardiac output measurements, any necessary ventilator management, and time spent discussing plan of care with patient and family, including goals of care discussion.

## 2021-05-24 NOTE — ED PROVIDER NOTE - NEUROLOGICAL, MLM
Alert and oriented, no focal deficits, no motor or sensory deficits. Contracture of left hand 5/5 strength throughout. Alert and oriented,  Contracture of left hand 5/5 strength throughout, speech clear, cranial nerve 2-12 intact.

## 2021-05-25 ENCOUNTER — TRANSCRIPTION ENCOUNTER (OUTPATIENT)
Age: 86
End: 2021-05-25

## 2021-05-25 LAB
A1C WITH ESTIMATED AVERAGE GLUCOSE RESULT: 6.4 % — HIGH (ref 4–5.6)
ALBUMIN SERPL ELPH-MCNC: 2.6 G/DL — LOW (ref 3.3–5)
ALP SERPL-CCNC: 162 U/L — HIGH (ref 40–120)
ALT FLD-CCNC: 272 U/L — HIGH (ref 12–78)
ANION GAP SERPL CALC-SCNC: 6 MMOL/L — SIGNIFICANT CHANGE UP (ref 5–17)
AST SERPL-CCNC: 287 U/L — HIGH (ref 15–37)
BASOPHILS # BLD AUTO: 0 K/UL — SIGNIFICANT CHANGE UP (ref 0–0.2)
BASOPHILS NFR BLD AUTO: 0 % — SIGNIFICANT CHANGE UP (ref 0–2)
BILIRUB SERPL-MCNC: 1 MG/DL — SIGNIFICANT CHANGE UP (ref 0.2–1.2)
BUN SERPL-MCNC: 17 MG/DL — SIGNIFICANT CHANGE UP (ref 7–23)
CALCIUM SERPL-MCNC: 8.4 MG/DL — LOW (ref 8.5–10.1)
CHLORIDE SERPL-SCNC: 111 MMOL/L — HIGH (ref 96–108)
CHOLEST SERPL-MCNC: 62 MG/DL — SIGNIFICANT CHANGE UP
CO2 SERPL-SCNC: 24 MMOL/L — SIGNIFICANT CHANGE UP (ref 22–31)
COVID-19 SPIKE DOMAIN AB INTERP: POSITIVE
COVID-19 SPIKE DOMAIN ANTIBODY RESULT: 1.63 U/ML — HIGH
CREAT SERPL-MCNC: 1.18 MG/DL — SIGNIFICANT CHANGE UP (ref 0.5–1.3)
CULTURE RESULTS: NO GROWTH — SIGNIFICANT CHANGE UP
E COLI DNA BLD POS QL NAA+NON-PROBE: SIGNIFICANT CHANGE UP
EOSINOPHIL # BLD AUTO: 0 K/UL — SIGNIFICANT CHANGE UP (ref 0–0.5)
EOSINOPHIL NFR BLD AUTO: 0 % — SIGNIFICANT CHANGE UP (ref 0–6)
ESTIMATED AVERAGE GLUCOSE: 137 MG/DL — HIGH (ref 68–114)
GLUCOSE SERPL-MCNC: 86 MG/DL — SIGNIFICANT CHANGE UP (ref 70–99)
GRAM STN FLD: SIGNIFICANT CHANGE UP
GRAM STN FLD: SIGNIFICANT CHANGE UP
HCT VFR BLD CALC: 37.3 % — LOW (ref 39–50)
HDLC SERPL-MCNC: 41 MG/DL — SIGNIFICANT CHANGE UP
HGB BLD-MCNC: 12.3 G/DL — LOW (ref 13–17)
LIPID PNL WITH DIRECT LDL SERPL: 9 MG/DL — SIGNIFICANT CHANGE UP
LYMPHOCYTES # BLD AUTO: 1.25 K/UL — SIGNIFICANT CHANGE UP (ref 1–3.3)
LYMPHOCYTES # BLD AUTO: 6 % — LOW (ref 13–44)
MAGNESIUM SERPL-MCNC: 1.7 MG/DL — SIGNIFICANT CHANGE UP (ref 1.6–2.6)
MANUAL SMEAR VERIFICATION: SIGNIFICANT CHANGE UP
MCHC RBC-ENTMCNC: 30.7 PG — SIGNIFICANT CHANGE UP (ref 27–34)
MCHC RBC-ENTMCNC: 33 GM/DL — SIGNIFICANT CHANGE UP (ref 32–36)
MCV RBC AUTO: 93 FL — SIGNIFICANT CHANGE UP (ref 80–100)
METAMYELOCYTES # FLD: 3 % — HIGH (ref 0–0)
METHOD TYPE: SIGNIFICANT CHANGE UP
MONOCYTES # BLD AUTO: 1.04 K/UL — HIGH (ref 0–0.9)
MONOCYTES NFR BLD AUTO: 5 % — SIGNIFICANT CHANGE UP (ref 2–14)
NEUTROPHILS # BLD AUTO: 17.89 K/UL — HIGH (ref 1.8–7.4)
NEUTROPHILS NFR BLD AUTO: 67 % — SIGNIFICANT CHANGE UP (ref 43–77)
NEUTS BAND # BLD: 19 % — HIGH (ref 0–8)
NON HDL CHOLESTEROL: 21 MG/DL — SIGNIFICANT CHANGE UP
NRBC # BLD: 0 /100 — SIGNIFICANT CHANGE UP (ref 0–0)
NRBC # BLD: SIGNIFICANT CHANGE UP /100 WBCS (ref 0–0)
PHOSPHATE SERPL-MCNC: 3.3 MG/DL — SIGNIFICANT CHANGE UP (ref 2.5–4.5)
PLAT MORPH BLD: NORMAL — SIGNIFICANT CHANGE UP
PLATELET # BLD AUTO: 138 K/UL — LOW (ref 150–400)
POTASSIUM SERPL-MCNC: 4 MMOL/L — SIGNIFICANT CHANGE UP (ref 3.5–5.3)
POTASSIUM SERPL-SCNC: 4 MMOL/L — SIGNIFICANT CHANGE UP (ref 3.5–5.3)
PROT SERPL-MCNC: 6 GM/DL — SIGNIFICANT CHANGE UP (ref 6–8.3)
RBC # BLD: 4.01 M/UL — LOW (ref 4.2–5.8)
RBC # FLD: 13.6 % — SIGNIFICANT CHANGE UP (ref 10.3–14.5)
RBC BLD AUTO: NORMAL — SIGNIFICANT CHANGE UP
SARS-COV-2 IGG+IGM SERPL QL IA: 1.63 U/ML — HIGH
SARS-COV-2 IGG+IGM SERPL QL IA: POSITIVE
SODIUM SERPL-SCNC: 141 MMOL/L — SIGNIFICANT CHANGE UP (ref 135–145)
SPECIMEN SOURCE: SIGNIFICANT CHANGE UP
TRIGL SERPL-MCNC: 60 MG/DL — SIGNIFICANT CHANGE UP
WBC # BLD: 20.8 K/UL — HIGH (ref 3.8–10.5)
WBC # FLD AUTO: 20.8 K/UL — HIGH (ref 3.8–10.5)

## 2021-05-25 PROCEDURE — 99232 SBSQ HOSP IP/OBS MODERATE 35: CPT

## 2021-05-25 PROCEDURE — 99223 1ST HOSP IP/OBS HIGH 75: CPT

## 2021-05-25 PROCEDURE — 76700 US EXAM ABDOM COMPLETE: CPT | Mod: 26

## 2021-05-25 PROCEDURE — 99291 CRITICAL CARE FIRST HOUR: CPT

## 2021-05-25 PROCEDURE — 74018 RADEX ABDOMEN 1 VIEW: CPT | Mod: 26

## 2021-05-25 PROCEDURE — 71045 X-RAY EXAM CHEST 1 VIEW: CPT | Mod: 26

## 2021-05-25 PROCEDURE — 95816 EEG AWAKE AND DROWSY: CPT | Mod: 26

## 2021-05-25 PROCEDURE — 70450 CT HEAD/BRAIN W/O DYE: CPT | Mod: 26

## 2021-05-25 PROCEDURE — 93285 PRGRMG DEV EVAL SCRMS IP: CPT | Mod: 26

## 2021-05-25 RX ORDER — SODIUM CHLORIDE 9 MG/ML
1000 INJECTION, SOLUTION INTRAVENOUS
Refills: 0 | Status: DISCONTINUED | OUTPATIENT
Start: 2021-05-25 | End: 2021-05-26

## 2021-05-25 RX ORDER — CLOPIDOGREL BISULFATE 75 MG/1
75 TABLET, FILM COATED ORAL DAILY
Refills: 0 | Status: DISCONTINUED | OUTPATIENT
Start: 2021-05-25 | End: 2021-06-04

## 2021-05-25 RX ORDER — ASPIRIN/CALCIUM CARB/MAGNESIUM 324 MG
81 TABLET ORAL DAILY
Refills: 0 | Status: DISCONTINUED | OUTPATIENT
Start: 2021-05-25 | End: 2021-06-04

## 2021-05-25 RX ADMIN — CHLORHEXIDINE GLUCONATE 1 APPLICATION(S): 213 SOLUTION TOPICAL at 06:58

## 2021-05-25 RX ADMIN — PANTOPRAZOLE SODIUM 40 MILLIGRAM(S): 20 TABLET, DELAYED RELEASE ORAL at 09:29

## 2021-05-25 RX ADMIN — HEPARIN SODIUM 5000 UNIT(S): 5000 INJECTION INTRAVENOUS; SUBCUTANEOUS at 06:58

## 2021-05-25 RX ADMIN — SODIUM CHLORIDE 75 MILLILITER(S): 9 INJECTION, SOLUTION INTRAVENOUS at 03:32

## 2021-05-25 RX ADMIN — Medication 300 MILLIGRAM(S): at 09:29

## 2021-05-25 RX ADMIN — SODIUM CHLORIDE 75 MILLILITER(S): 9 INJECTION, SOLUTION INTRAVENOUS at 17:56

## 2021-05-25 RX ADMIN — Medication 100 MILLIEQUIVALENT(S): at 01:01

## 2021-05-25 RX ADMIN — CLOPIDOGREL BISULFATE 75 MILLIGRAM(S): 75 TABLET, FILM COATED ORAL at 13:01

## 2021-05-25 RX ADMIN — Medication 100 MILLIEQUIVALENT(S): at 03:32

## 2021-05-25 RX ADMIN — ATORVASTATIN CALCIUM 80 MILLIGRAM(S): 80 TABLET, FILM COATED ORAL at 21:44

## 2021-05-25 RX ADMIN — HEPARIN SODIUM 5000 UNIT(S): 5000 INJECTION INTRAVENOUS; SUBCUTANEOUS at 21:45

## 2021-05-25 RX ADMIN — CEFEPIME 100 MILLIGRAM(S): 1 INJECTION, POWDER, FOR SOLUTION INTRAMUSCULAR; INTRAVENOUS at 12:27

## 2021-05-25 RX ADMIN — HEPARIN SODIUM 5000 UNIT(S): 5000 INJECTION INTRAVENOUS; SUBCUTANEOUS at 13:01

## 2021-05-25 NOTE — SWALLOW BEDSIDE ASSESSMENT ADULT - ASR SWALLOW DENTITION
Note that pt is edentulous. He does have U/L dentures that are being held by family. Their use is not necessary at this time.

## 2021-05-25 NOTE — PROGRESS NOTE ADULT - SUBJECTIVE AND OBJECTIVE BOX
· Reason for Admission	sepsis  5/25/21 : Pt more responsive today still aphasic, Rt side weakness persisting. Febrile. Blood C/s +ve.     MEDICATIONS  (STANDING):  aspirin Suppository 300 milliGRAM(s) Rectal daily  atorvastatin 80 milliGRAM(s) Oral at bedtime  cefepime   IVPB 2000 milliGRAM(s) IV Intermittent every 24 hours  chlorhexidine 4% Liquid 1 Application(s) Topical <User Schedule>  heparin   Injectable 5000 Unit(s) SubCutaneous every 8 hours  lactated ringers. 1000 milliLiter(s) (75 mL/Hr) IV Continuous <Continuous>  pantoprazole  Injectable 40 milliGRAM(s) IV Push daily      Vital Signs Last 24 Hrs  T(C): 36.9 (25 May 2021 09:00), Max: 38.9 (24 May 2021 13:30)  T(F): 98.5 (25 May 2021 09:00), Max: 102 (24 May 2021 13:30)  HR: 77 (25 May 2021 11:00) (77 - 135)  BP: 111/50 (25 May 2021 11:00) (91/44 - 151/96)  BP(mean): 65 (25 May 2021 11:00) (53 - 91)  RR: 14 (25 May 2021 11:00) (14 - 44)  SpO2: 100% (25 May 2021 11:00) (87% - 100%)    Neurological exam:  Lethargic but arouses to voice Expressive aphasia follows commands moving left side   , PERRL, EOMI, right facial droop.   RUE 0/5, RLE 2/5 LUE 5/5 LLE 5/5   sensation mildly diminished on right.   Reflexes +2 Rt brisk +2 left UE Plantars Up going Rt down going Left  TED not able to test on RT   Gait Unable to test    NIHSS: 13                        12.3   20.80 )-----------( 138      ( 25 May 2021 06:17 )             37.3     05-25    141  |  111<H>  |  17  ----------------------------<  86  4.0   |  24  |  1.18    Ca    8.4<L>      25 May 2021 06:17  Phos  3.3     05-25  Mg     1.7     05-25    TPro  6.0  /  Alb  2.6<L>  /  TBili  1.0  /  DBili  x   /  AST  287<H>  /  ALT  272<H>  /  AlkPhos  162<H>  05-25 05-25 Chol 62 LDL -- HDL 41 Trig 60, 05-14 Chol 119 LDL -- HDL 47 Trig 96    Radiology report:  - CT Head    1. The CT perfusion study suggests generalized hypoperfusion of both hemispheres as well as within the posterior fossa without a focal abnormality. No area of core infarction. See above.  2. Moderate calcified plaque right carotid bifurcation but without significant stenosis. Focal stenosis left internal carotid 1 cm above the bifurcation with no tandemlesion noted.  3. Patent vertebrals.  4. Widely patent vasculature noted intracranially. Mild scattered atherosclerotic changes. No occlusion or tight stenosis suggested.    Follow-up MR imaging of the brain may be considered for further assessment    < end of copied text >  < from: CT Brain Stroke Protocol (05.24.21 @ 12:39) >    1)  chronic ischemic changes in both hemispheres with volume loss and involutional change. No acute infarct, hemorrhage, or space occupying lesion suggested.  2)  follow-up MR imaging may be considered for further assessment.

## 2021-05-25 NOTE — CONSULT NOTE ADULT - ATTENDING COMMENTS
88 year old in ICU for CVA, with concnern for choleystitis.     F/y RUQ sonogram to r/o acalculous cholecystitis vs calculous cholecystitis vs stones/sludge.

## 2021-05-25 NOTE — PROGRESS NOTE ADULT - ASSESSMENT
Patient with history of recent stroke  presented with fever, sepsis, bacteremia with E. coli  ct no gall stomes, but slightly enhanced gb, now abnormal lfts  In ED had Acute stroke, was not TPA candidate given recent stroke,  no large vessel occlusion  was on aspirin plavix    Plan:    Bacteremia, sepsis, with e. coli, on cefepime, will get ruq sono, possible cholecystitis, abnormla lfts, will ask gi to see    CVA - will get f/u ct of head today on aspirin, had mri last week, no vascular disease, will get loop recorder interrogated        no afib on monitor    needs PT    will ask speech swallow to see    hydration

## 2021-05-25 NOTE — DISCHARGE NOTE NURSING/CASE MANAGEMENT/SOCIAL WORK - PATIENT PORTAL LINK FT
You can access the FollowMyHealth Patient Portal offered by [facility] by registering at the following website: [email or web address]. By joining [facility] FollowMyHealth portal, you will also be able to view your health information using other applications (apps) compatible with our system.

## 2021-05-25 NOTE — SWALLOW BEDSIDE ASSESSMENT ADULT - ASR SWALLOW RECOMMEND DIAG
DEFER MBS AS PT APPEARED CLINICALLY TOLERANT OF SUGGESTED PO TEXTURES FROM AN OROPHARYNGEAL SWALLOWING STANCE, DYSPHAGIA WITH A PROPENSITY TO FLUCTUATE IN SEVERITY GIVEN PROFILE AND CONSIDERING HIS REDUCED COMPREHENSION.

## 2021-05-25 NOTE — PHYSICAL THERAPY INITIAL EVALUATION ADULT - MANUAL MUSCLE TESTING RESULTS, REHAB EVAL
LUE shld at least 3+, elbow 4, dec L  (apparent RA), 0/5 R UE wxcept tace biceps, trace hip ext/knee flex RLE, ankle appears at least 2-

## 2021-05-25 NOTE — PHYSICAL THERAPY INITIAL EVALUATION ADULT - ACTIVE RANGE OF MOTION EXAMINATION, REHAB EVAL
GEORGE BOYKIN/JERRY WFL, LLE ltd hip/knee flex, lacks TKE, A/AA DF/PF WFL, no appreciable mvt RUE, obs some ankle mvt R

## 2021-05-25 NOTE — SWALLOW BEDSIDE ASSESSMENT ADULT - SWALLOW EVAL: DIAGNOSIS
1) Pt exhibits feeding compromise due to right sided weakness/left gaze preference/altered alertness-sensorium which is atop Oropharyngeal Dysphagia which subjectively appeared to be a grossly functional condition with a restricted inventory of modified food consistencies when alert/cognizant enough to be fed. Above in setting of sepsis and acute left CVA.  2) On encounter, a Corpak was in his nare. Right sided weakness/right facial droop were evident.  A right visual inattention was apparent. Pt was arousable but fatigued, communicatively passive & internally distractible. Pt occasionally followed a gestural directive but did not follow verbal commands and did not attempt to verbalize. Unable to direct to structured communication tasks. He appears to exhibits Global Aphasia in setting of acute left CVA with aphasia sequel that may appear heightened by encephalopathy features associated with sepsis. Communicative integrity declined compared to time of prior hospitalization.

## 2021-05-25 NOTE — PHYSICAL THERAPY INITIAL EVALUATION ADULT - PERTINENT HX OF CURRENT PROBLEM, REHAB EVAL
Patient was brought in from home with fever, weakness nausea and vomiting,  Patient was in hospital one week PTA with acute stroke, loop recorder placed. n ED the patient had acute right sided weakness and aphasia, cta no large vessel occlusion, given recent stroke was not deemed TPA candidate.  MRI last wk showed lacunar infarcts. CTH neg. ac findings, [facility] this date suggesting acute/subacute infarct. No evidence of hemorrhagic transformation.

## 2021-05-25 NOTE — SWALLOW BEDSIDE ASSESSMENT ADULT - SWALLOW EVAL: RECOMMENDED DIET
SUGGEST A DYSPHAGIA 1 DIET WITH HONEY THICK LIQUIDS AS THIS IS CURRENTLY THE LEAST RESTRICTIVE TOLERABLE DIET CONSISTENCIES FROM AN OROPHARYNGEAL SWALLOWING STANCE ON CLINICAL EXAM WHEN IN AN ALERT STATE(NOT ALWAYS THE CASE).

## 2021-05-25 NOTE — PROGRESS NOTE ADULT - SUBJECTIVE AND OBJECTIVE BOX
Events Overnight: blood culture positive for E. coli, temp was 101 dec to 99.2    HPI:      Patient was brought in from home with fever, weakness nausea and vomitting,  Patient was in hospital one week PTA with acute stroke      Has loop recorder      Had FABIO 5/15 and was on aspirin.      In ED the patient had acute right sided weakness and aphasia, cta no large vessel occlusion, given recent stroke was not deemed TPA candidate.       CTA no large vessel occlusion      MRI last weak no vascular occlusion      last week MRI showed lacunar infarcts      was on aspirin and plavix PTA    ROS cant obtain secondary to aphasia    PMH:            Rheumatoid arthritis        DiverticulitisC        CVA        History of hernia repair        History of appendectomy        loop recorder        MEDICATIONS  (STANDING):  aspirin Suppository 300 milliGRAM(s) Rectal daily  atorvastatin 80 milliGRAM(s) Oral at bedtime  cefepime   IVPB 2000 milliGRAM(s) IV Intermittent every 24 hours  chlorhexidine 4% Liquid 1 Application(s) Topical <User Schedule>  heparin   Injectable 5000 Unit(s) SubCutaneous every 8 hours  lactated ringers. 1000 milliLiter(s) (75 mL/Hr) IV Continuous <Continuous>  pantoprazole  Injectable 40 milliGRAM(s) IV Push daily    MEDICATIONS  (PRN):    Height (cm): 185.4 ( @ 11:22)  Weight (kg): 81.6 ( @ 11:22)  BMI (kg/m2): 23.7 ( @ 11:22)    ICU Vital Signs Last 24 Hrs  T(C): 37.3 (25 May 2021 05:00), Max: 38.9 (24 May 2021 13:30)  T(F): 99.2 (25 May 2021 05:00), Max: 102 (24 May 2021 13:30)  HR: 82 (25 May 2021 07:00) (78 - 135)  BP: 112/50 (25 May 2021 07:00) (91/44 - 151/96)  BP(mean): 65 (25 May 2021 07:00) (56 - 91)  ABP: --  ABP(mean): --  RR: 21 (25 May 2021 07:00) (15 - 44)  SpO2: 100% (25 May 2021 07:00) (87% - 100%)    I&O's Summary    24 May 2021 07:01  -  25 May 2021 07:00  --------------------------------------------------------  IN: 1544 mL / OUT: 416 mL / NET: 1128 mL                        12.3   20.80 )-----------( 138      ( 25 May 2021 06:17 )             37.3           141  |  111<H>  |  17  ----------------------------<  86  4.0   |  24  |  1.18    Ca    8.4<L>      25 May 2021 06:17  Phos  3.3       Mg     1.7         TPro  6.0  /  Alb  2.6<L>  /  TBili  1.0  /  DBili  x   /  AST  287<H>  /  ALT  272<H>  /  AlkPhos  162<H>        CARDIAC MARKERS ( 24 May 2021 14:50 )  0.046 ng/mL / x     / x     / x     / x      CARDIAC MARKERS ( 24 May 2021 12:12 )  <0.015 ng/mL / x     / x     / x     / x        Urinalysis Basic - ( 24 May 2021 15:04 )    Color: Yellow / Appearance: Clear / S.010 / pH: x  Gluc: x / Ketone: Negative  / Bili: Negative / Urobili: Negative mg/dL   Blood: x / Protein: 15 mg/dL / Nitrite: Negative   Leuk Esterase: Negative / RBC: 6-10 /HPF / WBC 0-2   Sq Epi: x / Non Sq Epi: Occasional / Bacteria: Occasional    DVT Prophylaxis:   Heparin subq                                                              Advanced Directives: Full Code    PE    General - Pale, weak, lethargic    HEENT - nc/at, facial droop    Neuro - weakly follow commands on left, paretic on right,lethargic, non verbal    neck - no bruid    lungs occasional rhonchi    cv rrr    abdomen - ? slight epigastric discomfort    ext warm

## 2021-05-25 NOTE — PROVIDER CONTACT NOTE (OTHER) - SITUATION
Emailed Sirena regarding ICU stroke/sepsis admit.
Notified Primary care physician Dr. Meño Carrera of patients admission to ICU
cholecystitis

## 2021-05-25 NOTE — PROGRESS NOTE ADULT - ASSESSMENT
85 YO M with a PMH of RA, Diverticulitis, CVA in 3/2021 and CVA 5/14. In ER acutely developed RUE weakness and aphasia. Neutropenic with elevated lactic acid .    # Acute Left CVA with Rt Hemiparesis   -Not a TPA candidate due to Recent CVA and Pt's medical condition with Hypoxia and Sepsis-NO Intervention as no LVO  -Continue Aspirin with Statin and Plavix.  -Repeat CT.  -MRI brain when medically stable  -Speech and swallow eval  -Bed side PT.  -Cardiology follow up.    # Sepsis   -Follow up with ID/ Antibiotics  -Discussed with Pt's wife and Dr. norwood

## 2021-05-25 NOTE — CONSULT NOTE ADULT - SUBJECTIVE AND OBJECTIVE BOX
Patient is a 87y old  Male who presents with a chief complaint of nausea, vomitting, acute stroke (25 May 2021 08:13)    HPI:  86yo male admitted with acute stroke who has expressive asphasia and right sided motor deficit.   Unable to obtain any information from patient.    PAST MEDICAL & SURGICAL HISTORY:  Rheumatoid arthritis    Diverticulitis    History of hernia repair    History of appendectomy      MEDICATIONS  (STANDING):  aspirin Suppository 300 milliGRAM(s) Rectal daily  atorvastatin 80 milliGRAM(s) Oral at bedtime  cefepime   IVPB 2000 milliGRAM(s) IV Intermittent every 24 hours  chlorhexidine 4% Liquid 1 Application(s) Topical <User Schedule>  heparin   Injectable 5000 Unit(s) SubCutaneous every 8 hours  lactated ringers. 1000 milliLiter(s) (75 mL/Hr) IV Continuous <Continuous>  pantoprazole  Injectable 40 milliGRAM(s) IV Push daily    MEDICATIONS  (PRN):    Allergies    penicillin (Unknown)    Intolerances      SOCIAL HISTORY:  FAMILY HISTORY:    REVIEW OF SYSTEMS:  Unable to obtain due to recent CVA- asphasia.    Vital Signs Last 24 Hrs  T(C): 36.9 (25 May 2021 09:00), Max: 38.9 (24 May 2021 13:30)  T(F): 98.5 (25 May 2021 09:00), Max: 102 (24 May 2021 13:30)  HR: 79 (25 May 2021 09:00) (78 - 135)  BP: 105/45 (25 May 2021 09:00) (91/44 - 151/96)  BP(mean): 60 (25 May 2021 09:00) (53 - 91)  RR: 21 (25 May 2021 09:00) (15 - 44)  SpO2: 99% (25 May 2021 09:00) (87% - 100%)    PHYSICAL EXAM:  Constitutional: Elderly male in NAD, nonverbal-expressive asphasia.   HEENT: MMM  Neck: No LAD  Respiratory: CTAB  Cardiovascular: S1 and S2, RRR, no M/G/R  Gastrointestinal: BS+, soft, NT/ND  Extremities: right sided motor deficit.   Vascular: 2+ peripheral pulses  Neurological: expressive asphasia.  Psychiatric: Normal mood, normal affect  Skin: No rashes    LABS:                        12.3   20.80 )-----------( 138      ( 25 May 2021 06:17 )             37.3     05-25    141  |  111<H>  |  17  ----------------------------<  86  4.0   |  24  |  1.18    Ca    8.4<L>      25 May 2021 06:17  Phos  3.3     05-25  Mg     1.7     05-25    TPro  6.0  /  Alb  2.6<L>  /  TBili  1.0  /  DBili  x   /  AST  287<H>  /  ALT  272<H>  /  AlkPhos  162<H>  05-25    PT/INR - ( 24 May 2021 12:12 )   PT: 12.8 sec;   INR: 1.11 ratio         PTT - ( 24 May 2021 12:12 )  PTT:24.1 sec  LIVER FUNCTIONS - ( 25 May 2021 06:17 )  Alb: 2.6 g/dL / Pro: 6.0 gm/dL / ALK PHOS: 162 U/L / ALT: 272 U/L / AST: 287 U/L / GGT: x             RADIOLOGY & ADDITIONAL STUDIES:

## 2021-05-25 NOTE — PHYSICAL THERAPY INITIAL EVALUATION ADULT - GENERAL OBSERVATIONS, REHAB EVAL
pt rec'd supine in bed in ICU, IV, monitors, O2, SCDs, rodriguez in place. pt appears drowsy, +eye contact, no verbalizations. L hand w/ ulnar drift of fingers, 5th digit flexed and abd.

## 2021-05-25 NOTE — PROCEDURE NOTE - ADDITIONAL PROCEDURE DETAILS
Normal ILR function.  Implanted 5/17/21, site intact with dermabond and steri strips.  Findings discussed with Dr. Martínez.

## 2021-05-25 NOTE — SWALLOW BEDSIDE ASSESSMENT ADULT - SLP GENERAL OBSERVATIONS
On encounter, a Corpak was in his nare. Right sided weakness/right facial droop were evident.  A right visual inattention was apparent. Pt was arousable but fatigued, communicatively passive & internally distractible. Pt occasionally followed a gestural directive but did not follow verbal commands and did not attempt to verbalize. Unable to direct to structured communication tasks. He appears to exhibits Global Aphasia in setting of acute left CVA with aphasia sequel that may appear heightened by encephalopathy features associated with sepsis. Communicative integrity declined compared to time of prior hospitalization.

## 2021-05-25 NOTE — SWALLOW BEDSIDE ASSESSMENT ADULT - NS SPL SWALLOW CLINIC TRIAL FT
NO BEHAVIORAL ASPIRATION SIGNS EXHIBITED WITH HONEY THICK LIQUIDS AND PUREE FOODS. LIQUIDS THINNER THAN HONEY CONSISTENCY AND SOLIDS NOT OFFERED GIVEN DYSPHAGIC PROFILE.

## 2021-05-25 NOTE — CONSULT NOTE ADULT - SUBJECTIVE AND OBJECTIVE BOX
Patient is a 87y old  Male who presents with a chief complaint of nausea, vomiting, acute stroke.       HPI:  87 yom with recent CVA presented with bacteremia and was noted to have recurrent CVA in the ER.  Pt seen this am . Pt is unable to answer questions.  He was unable to sit up in bed also.  Spoke to Dr Martínez.       PAST MEDICAL & SURGICAL HISTORY:  Rheumatoid arthritis    Diverticulitis    History of hernia repair    History of appendectomy        MEDICATIONS  (STANDING):  aspirin Suppository 300 milliGRAM(s) Rectal daily  atorvastatin 80 milliGRAM(s) Oral at bedtime  cefepime   IVPB 2000 milliGRAM(s) IV Intermittent every 24 hours  chlorhexidine 4% Liquid 1 Application(s) Topical <User Schedule>  heparin   Injectable 5000 Unit(s) SubCutaneous every 8 hours  lactated ringers. 1000 milliLiter(s) (75 mL/Hr) IV Continuous <Continuous>  pantoprazole  Injectable 40 milliGRAM(s) IV Push daily    MEDICATIONS  (PRN):      FAMILY HISTORY: unable to obtain from pt.       SOCIAL HISTORY: no recent smoking      REVIEW OF SYSTEMS:  CONSTITUTIONAL:    No fatigue, malaise, lethargy.  No fever or chills.  RESPIRATORY:  No cough.  No wheeze.  No hemoptysis.  No shortness of breath.  CARDIOVASCULAR:  No chest pains.  No palpitations. No shortness of breath, No orthopnea or PND.  GASTROINTESTINAL:  No abdominal pain.  No nausea or vomiting.    GENITOURINARY:    No hematuria.    MUSCULOSKELETAL:  No musculoskeletal pain.  No joint swelling.  No arthritis.  NEUROLOGICAL:  per HPI  PSYCHIATRIC:  No confusion  SKIN:  No rashes.            Vital Signs Last 24 Hrs  T(C): 36.9 (25 May 2021 09:00), Max: 38.9 (24 May 2021 13:30)  T(F): 98.5 (25 May 2021 09:00), Max: 102 (24 May 2021 13:30)  HR: 79 (25 May 2021 09:00) (78 - 135)  BP: 105/45 (25 May 2021 09:00) (91/44 - 151/96)  BP(mean): 60 (25 May 2021 09:00) (53 - 91)  RR: 21 (25 May 2021 09:00) (15 - 44)  SpO2: 99% (25 May 2021 09:00) (87% - 100%)    PHYSICAL EXAM-    Constitutional:  no acute distress     Head: Head is normocephalic and atraumatic.      Neck: No JVD.     Cardiovascular: Regular rate and rhythm without S3, S4. No murmurs or rubs are appreciated.      Respiratory: Breathsounds are normal. No rales. No wheezing.    Abdomen: Soft, nontender, nondistended with positive bowel sounds.      Extremity: No tenderness. No  pitting edema     Neurologic: The patient is alert and oriented.      Skin: No rash, no obvious lesions noted.      Psychiatric: The patient appears to be emotionally stable.      INTERPRETATION OF TELEMETRY: sinus rythm    ECG: Sinus rythm , RBBB    I&O's Detail    24 May 2021 07:01  -  25 May 2021 07:00  --------------------------------------------------------  IN:    IV PiggyBack: 400 mL    Lactated Ringers: 144 mL    Lactated Ringers Bolus: 1000 mL  Total IN: 1544 mL    OUT:    Indwelling Catheter - Urethral (mL): 416 mL  Total OUT: 416 mL    Total NET: 1128 mL          LABS:                        12.3   20.80 )-----------( 138      ( 25 May 2021 06:17 )             37.3     05-    141  |  111<H>  |  17  ----------------------------<  86  4.0   |  24  |  1.18    Ca    8.4<L>      25 May 2021 06:17  Phos  3.3       Mg     1.7         TPro  6.0  /  Alb  2.6<L>  /  TBili  1.0  /  DBili  x   /  AST  287<H>  /  ALT  272<H>  /  AlkPhos  162<H>      CARDIAC MARKERS ( 24 May 2021 14:50 )  0.046 ng/mL / x     / x     / x     / x      CARDIAC MARKERS ( 24 May 2021 12:12 )  <0.015 ng/mL / x     / x     / x     / x          PT/INR - ( 24 May 2021 12:12 )   PT: 12.8 sec;   INR: 1.11 ratio         PTT - ( 24 May 2021 12:12 )  PTT:24.1 sec  Urinalysis Basic - ( 24 May 2021 15:04 )    Color: Yellow / Appearance: Clear / S.010 / pH: x  Gluc: x / Ketone: Negative  / Bili: Negative / Urobili: Negative mg/dL   Blood: x / Protein: 15 mg/dL / Nitrite: Negative   Leuk Esterase: Negative / RBC: 6-10 /HPF / WBC 0-2   Sq Epi: x / Non Sq Epi: Occasional / Bacteria: Occasional      I&O's Summary    24 May 2021 07:01  -  25 May 2021 07:00  --------------------------------------------------------  IN: 1544 mL / OUT: 416 mL / NET: 1128 mL      BNP  RADIOLOGY & ADDITIONAL STUDIES:  ch< from: Xray Chest 1 View- PORTABLE-Urgent (21 @ 11:41) >  EXAM:  XR CHEST PORTABLE URGENT 1V                            PROCEDURE DATE:  2021          INTERPRETATION:  AP erect chest on May 24, 2021 at 11:38 AM. Patient has chest pain.    Heart is magnified by technique.    Presently there is a leftloop recorder new since .    Lungs remain clear.    Bilateral shoulder degeneration noted.    IMPRESSION: Left loop recorder presently seen. No acute finding or change.            JEM PARHAM MD; Attending Radiologist  This document has been electronically signed. May 24 2021  3:31PM    < end of copied text >  < from: TTE Echo Complete w/o Contrast w/ Doppler (21 @ 08:32) >   Impression     Summary     Mild to Moderate mitral regurgitation is present.   Fibrocalcific changes noted to the mitral valve leaflets with preserved   leaflet excursion.   Mild mitral annular calcification is present.   Mild to Moderate mitral regurgitation is present.   EA reversal of the mitral inflow consistent with reduced compliance of the   left ventricle   There are fibrocalcific changes noted to the aortic valve leaflets with   restriction in leaflet excursion. Transaortic gradients are slightly   elevated but do not meet the criteria for aortic stenosis. No aortic   insufficiency noted.   Trace to mild tricuspid valve regurgitation is present.   Normal appearing pulmonic valve structure and function.   Normal appearing left atrium.   The left ventricle is normal in size, wall thickness, wall motion and   contractility.   Estimated left ventricular ejection fraction is 55-60 %.     Signature     ----------------------------------------------------------------   Electronically signed by Laney AMADORInterpreting   physician) on 2021 05:31 PM   ----------------------------------------------------------------    < end of copied text >

## 2021-05-25 NOTE — SWALLOW BEDSIDE ASSESSMENT ADULT - ORAL PHASE
Bolus formation/transfer were moderately prolonged/disorganized/discontinuous but felt to be grossly mechanically functional with the above modified food consistencies. 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 Mild tongue debris noted with puree foods. Bolus formation/transfer were moderately prolonged/disorganized/discontinuous but felt to be grossly mechanically functional with the above modified food consistencies. 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 Mild tongue debris noted with puree foods on the right > left. .

## 2021-05-25 NOTE — SWALLOW BEDSIDE ASSESSMENT ADULT - ORAL PREPARATORY PHASE
Pt willingly accepted PO. Oral aperture was reduced. Labial grading was functionally decreased on the right. Pt was fatigued and distractible. Pt willingly accepted PO. Oral aperture was reduced. Labial grading was functionally decreased on the right.

## 2021-05-25 NOTE — SWALLOW BEDSIDE ASSESSMENT ADULT - SWALLOW EVAL: FEEDING ASSISTANCE
PT HAS ARTHRITIC DEFORMITIES OF HIS FINGERS & IS VARIABLY FATIGUED/DISTRACTIBLE. PT ALSO WITH RIGHT VISUAL INATTENTION. ASSIST WAS PROVIDED TO FEED.

## 2021-05-25 NOTE — SWALLOW BEDSIDE ASSESSMENT ADULT - SWALLOW EVAL: SECRETION MANAGEMENT
Oral saliva management is reduced on the right and strength of non nutritive cough is somewhat decreased, based on limited clinical testing.

## 2021-05-25 NOTE — SWALLOW BEDSIDE ASSESSMENT ADULT - PHARYNGEAL PHASE
Swallow trigger was timely to minimally latent. Laryngeal lift on palpation during swallowing trials was moderately reduced but felt to be functional with the above modified food textures./Within functional limits

## 2021-05-25 NOTE — SWALLOW BEDSIDE ASSESSMENT ADULT - COMMENTS
The patient was admitted to  lethargy and fever. Initially, pt found to have hypoxia, positive blood cultures, bacteria in urine abnormal LFT's and possible cholecystitis. While in hospital, he developed onset of right sided weakness with Aphasia consistent with acute left CVA.  Note that pt was recently hospitalized at this facility last week with Dysarthria/Aphasia that resolved. Pt was also previously hospitalized at this facility in 3/21 and was diagnosed with mild functional Dysarthria WITHOUT APHASIA that also eventually resolved. Further, his oropharyngeal swallowing abilities were felt to be functional for age at the times when he was wearing his U/L dentures. Imaging of brain during past hospitalizations was notable for tiny punctate lacunar infarcts in left Globus Pallidus region with restricted diffusion. Other prior history is remarkable for RA, diverticulosis, prior appy, and past hernia repair. The patient was admitted to  with lethargy and fever. Initially, pt found to have hypoxia, positive blood cultures, bacteria in urine abnormal LFT's and possible cholecystitis. While in ED, he developed onset of right sided weakness with Aphasia consistent with acute left CVA.  Note that pt was recently hospitalized at this facility last week with Dysarthria/Aphasia that resolved. Pt was also previously hospitalized at this facility in 3/21 and was diagnosed with mild functional Dysarthria WITHOUT APHASIA that also eventually resolved. Further, his oropharyngeal swallowing abilities were felt to be functional for age at the times when he was wearing his U/L dentures. Imaging of brain during past hospitalizations was notable for tiny punctate lacunar infarcts in left Globus Pallidus region with restricted diffusion. Other prior history is remarkable for RA, diverticulosis, prior appy, and past hernia repair.

## 2021-05-25 NOTE — PROVIDER CONTACT NOTE (CRITICAL VALUE NOTIFICATION) - TEST AND RESULT REPORTED:
Prelim. Blood cultures x2 have gram negative rods
lactate=3.5
trop 0.046
blood cultures drawn 5/24  preliminary results growth in aerobic and anaerobic gram negative rods

## 2021-05-26 LAB
-  AMIKACIN: SIGNIFICANT CHANGE UP
-  AMPICILLIN/SULBACTAM: SIGNIFICANT CHANGE UP
-  AMPICILLIN: SIGNIFICANT CHANGE UP
-  AZTREONAM: SIGNIFICANT CHANGE UP
-  CEFAZOLIN: SIGNIFICANT CHANGE UP
-  CEFEPIME: SIGNIFICANT CHANGE UP
-  CEFOXITIN: SIGNIFICANT CHANGE UP
-  CEFTRIAXONE: SIGNIFICANT CHANGE UP
-  CIPROFLOXACIN: SIGNIFICANT CHANGE UP
-  ERTAPENEM: SIGNIFICANT CHANGE UP
-  GENTAMICIN: SIGNIFICANT CHANGE UP
-  IMIPENEM: SIGNIFICANT CHANGE UP
-  LEVOFLOXACIN: SIGNIFICANT CHANGE UP
-  MEROPENEM: SIGNIFICANT CHANGE UP
-  PIPERACILLIN/TAZOBACTAM: SIGNIFICANT CHANGE UP
-  TOBRAMYCIN: SIGNIFICANT CHANGE UP
-  TRIMETHOPRIM/SULFAMETHOXAZOLE: SIGNIFICANT CHANGE UP
ALBUMIN SERPL ELPH-MCNC: 2.4 G/DL — LOW (ref 3.3–5)
ALP SERPL-CCNC: 143 U/L — HIGH (ref 40–120)
ALT FLD-CCNC: 160 U/L — HIGH (ref 12–78)
ANION GAP SERPL CALC-SCNC: 6 MMOL/L — SIGNIFICANT CHANGE UP (ref 5–17)
AST SERPL-CCNC: 114 U/L — HIGH (ref 15–37)
BILIRUB SERPL-MCNC: 0.9 MG/DL — SIGNIFICANT CHANGE UP (ref 0.2–1.2)
BUN SERPL-MCNC: 20 MG/DL — SIGNIFICANT CHANGE UP (ref 7–23)
CALCIUM SERPL-MCNC: 8.9 MG/DL — SIGNIFICANT CHANGE UP (ref 8.5–10.1)
CHLORIDE SERPL-SCNC: 110 MMOL/L — HIGH (ref 96–108)
CO2 SERPL-SCNC: 23 MMOL/L — SIGNIFICANT CHANGE UP (ref 22–31)
CREAT SERPL-MCNC: 0.78 MG/DL — SIGNIFICANT CHANGE UP (ref 0.5–1.3)
CULTURE RESULTS: SIGNIFICANT CHANGE UP
CULTURE RESULTS: SIGNIFICANT CHANGE UP
GLUCOSE SERPL-MCNC: 108 MG/DL — HIGH (ref 70–99)
HCT VFR BLD CALC: 36.9 % — LOW (ref 39–50)
HGB BLD-MCNC: 12.6 G/DL — LOW (ref 13–17)
MCHC RBC-ENTMCNC: 30.9 PG — SIGNIFICANT CHANGE UP (ref 27–34)
MCHC RBC-ENTMCNC: 34.1 GM/DL — SIGNIFICANT CHANGE UP (ref 32–36)
MCV RBC AUTO: 90.4 FL — SIGNIFICANT CHANGE UP (ref 80–100)
METHOD TYPE: SIGNIFICANT CHANGE UP
ORGANISM # SPEC MICROSCOPIC CNT: SIGNIFICANT CHANGE UP
PLATELET # BLD AUTO: 107 K/UL — LOW (ref 150–400)
POTASSIUM SERPL-MCNC: 3.8 MMOL/L — SIGNIFICANT CHANGE UP (ref 3.5–5.3)
POTASSIUM SERPL-SCNC: 3.8 MMOL/L — SIGNIFICANT CHANGE UP (ref 3.5–5.3)
PROT SERPL-MCNC: 5.7 GM/DL — LOW (ref 6–8.3)
RBC # BLD: 4.08 M/UL — LOW (ref 4.2–5.8)
RBC # FLD: 13.6 % — SIGNIFICANT CHANGE UP (ref 10.3–14.5)
SODIUM SERPL-SCNC: 139 MMOL/L — SIGNIFICANT CHANGE UP (ref 135–145)
SPECIMEN SOURCE: SIGNIFICANT CHANGE UP
SPECIMEN SOURCE: SIGNIFICANT CHANGE UP
WBC # BLD: 16.11 K/UL — HIGH (ref 3.8–10.5)
WBC # FLD AUTO: 16.11 K/UL — HIGH (ref 3.8–10.5)

## 2021-05-26 PROCEDURE — 99232 SBSQ HOSP IP/OBS MODERATE 35: CPT

## 2021-05-26 PROCEDURE — 70551 MRI BRAIN STEM W/O DYE: CPT | Mod: 26

## 2021-05-26 PROCEDURE — 99291 CRITICAL CARE FIRST HOUR: CPT

## 2021-05-26 RX ORDER — ACETAMINOPHEN 500 MG
1000 TABLET ORAL ONCE
Refills: 0 | Status: COMPLETED | OUTPATIENT
Start: 2021-05-26 | End: 2021-05-26

## 2021-05-26 RX ADMIN — Medication 400 MILLIGRAM(S): at 14:54

## 2021-05-26 RX ADMIN — HEPARIN SODIUM 5000 UNIT(S): 5000 INJECTION INTRAVENOUS; SUBCUTANEOUS at 13:56

## 2021-05-26 RX ADMIN — CLOPIDOGREL BISULFATE 75 MILLIGRAM(S): 75 TABLET, FILM COATED ORAL at 10:40

## 2021-05-26 RX ADMIN — Medication 81 MILLIGRAM(S): at 10:40

## 2021-05-26 RX ADMIN — CHLORHEXIDINE GLUCONATE 1 APPLICATION(S): 213 SOLUTION TOPICAL at 05:45

## 2021-05-26 RX ADMIN — PANTOPRAZOLE SODIUM 40 MILLIGRAM(S): 20 TABLET, DELAYED RELEASE ORAL at 10:40

## 2021-05-26 RX ADMIN — HEPARIN SODIUM 5000 UNIT(S): 5000 INJECTION INTRAVENOUS; SUBCUTANEOUS at 05:44

## 2021-05-26 RX ADMIN — HEPARIN SODIUM 5000 UNIT(S): 5000 INJECTION INTRAVENOUS; SUBCUTANEOUS at 21:27

## 2021-05-26 RX ADMIN — CEFEPIME 100 MILLIGRAM(S): 1 INJECTION, POWDER, FOR SOLUTION INTRAMUSCULAR; INTRAVENOUS at 18:20

## 2021-05-26 RX ADMIN — CEFEPIME 100 MILLIGRAM(S): 1 INJECTION, POWDER, FOR SOLUTION INTRAMUSCULAR; INTRAVENOUS at 12:44

## 2021-05-26 RX ADMIN — Medication 1000 MILLIGRAM(S): at 15:24

## 2021-05-26 RX ADMIN — SODIUM CHLORIDE 75 MILLILITER(S): 9 INJECTION, SOLUTION INTRAVENOUS at 05:46

## 2021-05-26 RX ADMIN — ATORVASTATIN CALCIUM 80 MILLIGRAM(S): 80 TABLET, FILM COATED ORAL at 21:27

## 2021-05-26 NOTE — PROGRESS NOTE ADULT - ASSESSMENT
Patient with history of recent stroke  presented with fever, sepsis, bacteremia with E. coli  ct no gall stones, but sludge in  gb, now abnormal lfts but improving  In ED had Acute stroke, was not TPA candidate given recent stroke,  no large vessel occlusion  was on aspirin , plavix    Plan:    Bacteremia, sepsis, with e. coli, on cefepime, no thickened wall, but tumefactic sludge in GB            bp, fever is better, t max 100.4, will d/w gi sonographic findings    CVA -  will get f/u MRI, on aspirin , plavix,    needs PT    will ask speech swallow to see, receiving tube feeds

## 2021-05-26 NOTE — DIETITIAN INITIAL EVALUATION ADULT. - ADD RECOMMEND
1) if TF to continue, rec'd change TF Rx to Glucerna 1.5 with goal rate of 70mL/hr with 1pkt Prosource TF.  2) monitor level of alertness for when pt can start having PO diet 3)  monitor hydration status; add free water flushes of 30mL q1hr (=600mL additional free water daily) 4) monitor TF tolerance; keep back of bed > 35 degrees 5) daily wt checks to track/trend changes 6) monitor BM: if > 3 days without BM, add bowel regimen

## 2021-05-26 NOTE — PROGRESS NOTE ADULT - SUBJECTIVE AND OBJECTIVE BOX
· Reason for Admission	CVA/ Rt side weakness/ Sepsis  5/26/21 : Pt lethargic, opens eyes  to calling name, follows simple commands moves left side and RT LE slightly. T Max 100.4 .     MEDICATIONS  (STANDING):  aspirin  chewable 81 milliGRAM(s) Oral daily  atorvastatin 80 milliGRAM(s) Oral at bedtime  cefepime   IVPB 2000 milliGRAM(s) IV Intermittent every 24 hours  chlorhexidine 4% Liquid 1 Application(s) Topical <User Schedule>  clopidogrel Tablet 75 milliGRAM(s) Oral daily  heparin   Injectable 5000 Unit(s) SubCutaneous every 8 hours  pantoprazole  Injectable 40 milliGRAM(s) IV Push daily      Vital Signs Last 24 Hrs  T(C): 36.9 (26 May 2021 10:00), Max: 38 (25 May 2021 21:00)  T(F): 98.4 (26 May 2021 10:00), Max: 100.4 (25 May 2021 21:00)  HR: 83 (26 May 2021 10:00) (74 - 97)  BP: 124/56 (26 May 2021 10:00) (100/45 - 149/71)  BP(mean): 73 (26 May 2021 10:00) (59 - 92)  RR: 22 (26 May 2021 10:00) (14 - 22)  SpO2: 94% (26 May 2021 10:00) (93% - 100%)    Neurological exam:  Lethargic but arouses to voice Expressive aphasia follows commands moving left side   PERRL, EOMI,  right facial droop.   RUE 0/5, RLE 2/5 LUE 5/5 LLE 5/5   sensation mildly diminished on right.   Reflexes +2 Rt brisk +2 left UE Plantars Up going Rt down going Left  TED not able to test on RT   Gait Unable to test    NIHSS: 13                        12.6   16.11 )-----------( 107      ( 26 May 2021 06:15 )             36.9     05-26    139  |  110<H>  |  20  ----------------------------<  108<H>  3.8   |  23  |  0.78    Ca    8.9      26 May 2021 06:15  Phos  3.3     05-25  Mg     1.7     05-25    TPro  5.7<L>  /  Alb  2.4<L>  /  TBili  0.9  /  DBili  x   /  AST  114<H>  /  ALT  160<H>  /  AlkPhos  143<H>  05-26 05-25 Chol 62 LDL -- HDL 41 Trig 60, 05-14 Chol 119 LDL -- HDL 47 Trig 96    Radiology report:  - CT Head  < from: CT Head No Cont (05.25.21 @ 09:25) >  IMPRESSION: Area of hypodensity in the left parietal occipital region suggesting acute/subacute infarct. No evidence of hemorrhagic transformation.    CT Brain from 5/24/21 :    1. The CT perfusion study suggests generalized hypoperfusion of both hemispheres as well as within the posterior fossa without a focal abnormality. No area of core infarction. See above.  2. Moderate calcified plaque right carotid bifurcation but without significant stenosis. Focal stenosis left internal carotid 1 cm above the bifurcation with no tandemlesion noted.  3. Patent vertebrals.  4. Widely patent vasculature noted intracranially. Mild scattered atherosclerotic changes. No occlusion or tight stenosis suggested.    Follow-up MR imaging of the brain may be considered for further assessment    < end of copied text >  < from: CT Brain Stroke Protocol (05.24.21 @ 12:39) >    1)  chronic ischemic changes in both hemispheres with volume loss and involutional change. No acute infarct, hemorrhage, or space occupying lesion suggested.  2)  follow-up MR imaging may be considered for further assessment.  - EEG  < from: EEG Awake or Drowsy (05.25.21 @ 15:00) >  Impression:  1.This is an abnormal EEG due to the presence of mild generalized background slowing may be on the basis of metabolic causes.  2. Marked left frontal and temporal slowing indicative of underlyingstructural pathology. Clinical correlation recommended.  3. No epileptiform activity noted. Clinical correlation recommended.

## 2021-05-26 NOTE — PROGRESS NOTE ADULT - SUBJECTIVE AND OBJECTIVE BOX
Patient is a 87y old  Male who presents with a chief complaint of CVA (26 May 2021 09:19)    HPI:  88 yo male with expressive asphasia, nonverbal and recent CVA.    Low grade fevers improving.    PAST MEDICAL & SURGICAL HISTORY:  Rheumatoid arthritis    Diverticulitis    History of hernia repair    History of appendectomy      MEDICATIONS  (STANDING):  aspirin  chewable 81 milliGRAM(s) Oral daily  atorvastatin 80 milliGRAM(s) Oral at bedtime  cefepime   IVPB 2000 milliGRAM(s) IV Intermittent every 24 hours  chlorhexidine 4% Liquid 1 Application(s) Topical <User Schedule>  clopidogrel Tablet 75 milliGRAM(s) Oral daily  heparin   Injectable 5000 Unit(s) SubCutaneous every 8 hours  pantoprazole  Injectable 40 milliGRAM(s) IV Push daily    MEDICATIONS  (PRN):    Allergies    penicillin (Unknown)    Intolerances      SOCIAL HISTORY:  FAMILY HISTORY:    REVIEW OF SYSTEMS:  Unable to obtain due to above.     Vital Signs Last 24 Hrs  T(C): 36.9 (26 May 2021 10:00), Max: 38 (25 May 2021 21:00)  T(F): 98.4 (26 May 2021 10:00), Max: 100.4 (25 May 2021 21:00)  HR: 83 (26 May 2021 10:00) (74 - 97)  BP: 124/56 (26 May 2021 10:00) (100/45 - 149/71)  BP(mean): 73 (26 May 2021 10:00) (59 - 92)  RR: 22 (26 May 2021 10:00) (14 - 22)  SpO2: 94% (26 May 2021 10:00) (93% - 100%)    PHYSICAL EXAM:  Constitutional: Elderly male in NAD, nonverbal.   Respiratory: CTAB  Cardiovascular: S1 and S2, RRR, no M/G/R  Gastrointestinal: BS+, soft, NT/ND    LABS:                        12.6   16.11 )-----------( 107      ( 26 May 2021 06:15 )             36.9     05-26    139  |  110<H>  |  20  ----------------------------<  108<H>  3.8   |  23  |  0.78    Ca    8.9      26 May 2021 06:15  Phos  3.3     05-25  Mg     1.7     05-25    TPro  5.7<L>  /  Alb  2.4<L>  /  TBili  0.9  /  DBili  x   /  AST  114<H>  /  ALT  160<H>  /  AlkPhos  143<H>  05-26    PT/INR - ( 24 May 2021 12:12 )   PT: 12.8 sec;   INR: 1.11 ratio         PTT - ( 24 May 2021 12:12 )  PTT:24.1 sec  LIVER FUNCTIONS - ( 26 May 2021 06:15 )  Alb: 2.4 g/dL / Pro: 5.7 gm/dL / ALK PHOS: 143 U/L / ALT: 160 U/L / AST: 114 U/L / GGT: x             RADIOLOGY & ADDITIONAL STUDIES:

## 2021-05-26 NOTE — DIETITIAN INITIAL EVALUATION ADULT. - PHYSCIAL ASSESSMENT
jazmyn score of 14; stage 1 PU on sacrum  no BM x 2 days; if > 3 days without BM, add bowel regimen underweight

## 2021-05-26 NOTE — DIETITIAN INITIAL EVALUATION ADULT. - PERTINENT MEDS FT
MEDICATIONS  (STANDING):  aspirin  chewable 81 milliGRAM(s) Oral daily  atorvastatin 80 milliGRAM(s) Oral at bedtime  cefepime   IVPB 2000 milliGRAM(s) IV Intermittent every 24 hours  chlorhexidine 4% Liquid 1 Application(s) Topical <User Schedule>  clopidogrel Tablet 75 milliGRAM(s) Oral daily  heparin   Injectable 5000 Unit(s) SubCutaneous every 8 hours  pantoprazole  Injectable 40 milliGRAM(s) IV Push daily

## 2021-05-26 NOTE — DIETITIAN INITIAL EVALUATION ADULT. - ENTERAL
if TF to continue, rec'd change TF Rx to Glucerna 1.5 with goal rate of 70mL/hr with 1pkt Prosource TF.  Which will provide ~2140Kcal, 127g protein, 1063mL free water, and total TF volume of 1400mL

## 2021-05-26 NOTE — DIETITIAN NUTRITION RISK NOTIFICATION - TREATMENT: THE FOLLOWING DIET HAS BEEN RECOMMENDED
Diet, NPO with Tube Feed:   Tube Feeding Modality: Nasogastric  Glucerna 1.5 Chi (GLUCERNA1.5)  Total Volume for 24 Hours (mL): 1680  Continuous  Until Goal Tube Feed Rate (mL per Hour): 70  Tube Feed Duration (in Hours): 24  Tube Feed Start Time: 00:00  Free Water Flush  Pump   Rate (mL per Hour): 0   Frequency: Every Hour    Duration (Hours): 24    Start Time: 00:00  No Carb Prosource TF     Qty per Day:  1 (05-26-21 @ 09:38) [Pending Verification By Attending]  Diet, NPO with Tube Feed:   Tube Feeding Modality: Orogastric  Glucerna 1.5 Chi (GLUCERNA1.5)  Total Volume for 24 Hours (mL): 1320  Continuous  Starting Tube Feed Rate {mL per Hour}: 20  Increase Tube Feed Rate by (mL): 20     Every 6 hours  Until Goal Tube Feed Rate (mL per Hour): 55  Tube Feed Duration (in Hours): 24  Tube Feed Start Time: 20:00 (05-25-21 @ 17:36) [Active]

## 2021-05-26 NOTE — PROGRESS NOTE ADULT - SUBJECTIVE AND OBJECTIVE BOX
Events Overnight:  Patient alert, but remains aphasic with severe right sided paresis    HPI:      Patient was brought in from home with fever, weakness nausea and vomitting,  Patient was in hospital one week PTA with acute stroke      Had loop recorder was interogated now afib      Had FABIO 5/15 and was on aspirin, and plavix      In ED the patient had acute right sided weakness and aphasia, cta no large vessel occlusion, given recent stroke was not deemed TPA candidate.       CTA no large vessel occlusion      MRI last weak no vascular occlusion      last week MRI showed lacunar infarcts      was on aspirin and plavix PTA      Blood cultures were positive for ecoli,       abdominal workup only shows sludge in gb  ROS cant obtain secondary to aphasia    PMH:            Rheumatoid arthritis        DiverticulitisC        CVA        History of hernia repair        History of appendectomy        loop recorder        MEDICATIONS  (STANDING):  aspirin  chewable 81 milliGRAM(s) Oral daily  atorvastatin 80 milliGRAM(s) Oral at bedtime  cefepime   IVPB 2000 milliGRAM(s) IV Intermittent every 24 hours  chlorhexidine 4% Liquid 1 Application(s) Topical <User Schedule>  clopidogrel Tablet 75 milliGRAM(s) Oral daily  heparin   Injectable 5000 Unit(s) SubCutaneous every 8 hours  lactated ringers. 1000 milliLiter(s) (75 mL/Hr) IV Continuous <Continuous>  pantoprazole  Injectable 40 milliGRAM(s) IV Push daily    MEDICATIONS  (PRN):    ICU Vital Signs Last 24 Hrs  T(C): 37.4 (26 May 2021 06:00), Max: 38 (25 May 2021 21:00)  T(F): 99.4 (26 May 2021 06:00), Max: 100.4 (25 May 2021 21:00)  HR: 81 (26 May 2021 07:00) (74 - 97)  BP: 128/58 (26 May 2021 07:00) (100/45 - 149/71)  BP(mean): 77 (26 May 2021 07:00) (59 - 92)  ABP: --  ABP(mean): --  RR: 19 (26 May 2021 07:00) (14 - 21)  SpO2: 93% (26 May 2021 07:00) (93% - 100%)    I&O's Summary    25 May 2021 07:01  -  26 May 2021 07:00  --------------------------------------------------------  IN: 1973 mL / OUT: 1200 mL / NET: 773 mL                        12.6   16.11 )-----------( 107      ( 26 May 2021 06:15 )             36.9       05-26    139  |  110<H>  |  20  ----------------------------<  108<H>  3.8   |  23  |  0.78    Ca    8.9      26 May 2021 06:15  Phos  3.3     05-25  Mg     1.7     05-25    TPro  5.7<L>  /  Alb  2.4<L>  /  TBili  0.9  /  DBili  x   /  AST  114<H>  /  ALT  160<H>  /  AlkPhos  143<H>  05-26      CARDIAC MARKERS ( 24 May 2021 14:50 )  0.046 ng/mL / x     / x     / x     / x      CARDIAC MARKERS ( 24 May 2021 12:12 )  <0.015 ng/mL / x     / x     / x     / x        Urinalysis Basic - ( 24 May 2021 15:04 )    Color: Yellow / Appearance: Clear / S.010 / pH: x  Gluc: x / Ketone: Negative  / Bili: Negative / Urobili: Negative mg/dL   Blood: x / Protein: 15 mg/dL / Nitrite: Negative   Leuk Esterase: Negative / RBC: 6-10 /HPF / WBC 0-2   Sq Epi: x / Non Sq Epi: Occasional / Bacteria: Occasional    DVT Prophylaxis:   Heparin subq                                                              Advanced Directives: Full Code

## 2021-05-26 NOTE — PROGRESS NOTE ADULT - ASSESSMENT
· Assessment	  85 YO M with a PMH of RA, Diverticulitis, CVA in 3/2021 and CVA 5/14. In ER acutely developed RUE weakness and aphasia. Neutropenic with elevated lactic acid .    # Acute Left CVA with Rt Hemiparesis   -Not a TPA candidate due to Recent CVA and Pt's medical condition with Hypoxia and Sepsis-NO Intervention as no LVO  -Continue Aspirin with Statin and Plavix.  -Repeat CT showed evolving left CVA  -MRI brain when medically stable  -Speech and swallow eval  -Bed side PT.  -Cardiology follow up.  -EEG left > Rt slow . No epileptiform  activity    # Sepsis   -Follow up with ID/ Antibiotics  -Discussed with Pt's wife , daughter and Dr. Martínez

## 2021-05-26 NOTE — DIETITIAN INITIAL EVALUATION ADULT. - OTHER INFO
88yo male with PMH significant for ischemic CVA, diverticulitis, rheumatoid arthritis.  Pt admitted with acute cholecystitis with elevated LFT"s and fevers, sepsis, bacteremia.  In ED, pt had acute stroke, TPA not given due to recent stroke.    *SLP eval'd pt on 5/25; rec'd dysphagia 1 with honey thick liquids only when fully alert.  However, during RD visit, pt is not alert and unable to eat at this time.  *pt s/p corpak placement on 5/25 with start of TF.

## 2021-05-26 NOTE — PROGRESS NOTE ADULT - SUBJECTIVE AND OBJECTIVE BOX
Cardiology Progress Note    HPI: 87 yom with recent CVA presented with bacteremia and was noted to have recurrent CVA in the ER.  Pt seen this am . Pt is unable to answer questions.  He was unable to sit up in bed also.    PAST MEDICAL & SURGICAL HISTORY:  Rheumatoid arthritis  Diverticulitis  History of hernia repair  History of appendectomy    MEDICATIONS  (STANDING):  aspirin Suppository 300 milliGRAM(s) Rectal daily  atorvastatin 80 milliGRAM(s) Oral at bedtime  cefepime   IVPB 2000 milliGRAM(s) IV Intermittent every 24 hours  chlorhexidine 4% Liquid 1 Application(s) Topical <User Schedule>  heparin   Injectable 5000 Unit(s) SubCutaneous every 8 hours  lactated ringers. 1000 milliLiter(s) (75 mL/Hr) IV Continuous <Continuous>  pantoprazole  Injectable 40 milliGRAM(s) IV Push daily    MEDICATIONS  (PRN):    FAMILY HISTORY: unable to obtain from pt.     SOCIAL HISTORY: no recent smoking      REVIEW OF SYSTEMS:  CONSTITUTIONAL:    No fatigue, malaise, lethargy.  No fever or chills.  RESPIRATORY:  No cough.  No wheeze.  No hemoptysis.  No shortness of breath.  CARDIOVASCULAR:  No chest pains.  No palpitations. No shortness of breath, No orthopnea or PND.  GASTROINTESTINAL:  No abdominal pain.  No nausea or vomiting.    GENITOURINARY:    No hematuria.    MUSCULOSKELETAL:  No musculoskeletal pain.  No joint swelling.  No arthritis.  NEUROLOGICAL:  per HPI    Vital Signs Last 24 Hrs  T(C): 36.9 (25 May 2021 09:00), Max: 38.9 (24 May 2021 13:30)  T(F): 98.5 (25 May 2021 09:00), Max: 102 (24 May 2021 13:30)  HR: 79 (25 May 2021 09:00) (78 - 135)  BP: 105/45 (25 May 2021 09:00) (91/44 - 151/96)  BP(mean): 60 (25 May 2021 09:00) (53 - 91)  RR: 21 (25 May 2021 09:00) (15 - 44)  SpO2: 99% (25 May 2021 09:00) (87% - 100%)    PHYSICAL EXAM-    Constitutional:  no acute distress   Head: Head is normocephalic and atraumatic.    Neck: No JVD.   Cardiovascular: Regular rate and rhythm without S3, S4. No murmurs or rubs are appreciated.    Respiratory: Breathsounds are normal. No rales. No wheezing.  Abdomen: Soft, nontender, nondistended with positive bowel sounds.    Extremity: No tenderness. No  pitting edema     INTERPRETATION OF TELEMETRY: sinus rythm    ECG: Sinus rythm , RBBB    I&O's Detail    24 May 2021 07:01  -  25 May 2021 07:00  --------------------------------------------------------  IN:    IV PiggyBack: 400 mL    Lactated Ringers: 144 mL    Lactated Ringers Bolus: 1000 mL  Total IN: 1544 mL    OUT:    Indwelling Catheter - Urethral (mL): 416 mL  Total OUT: 416 mL    Total NET: 1128 mL    LABS:                        12.3   20.80 )-----------( 138      ( 25 May 2021 06:17 )             37.3     05-    141  |  111<H>  |  17  ----------------------------<  86  4.0   |  24  |  1.18    Ca    8.4<L>      25 May 2021 06:17  Phos  3.3       Mg     1.7         TPro  6.0  /  Alb  2.6<L>  /  TBili  1.0  /  DBili  x   /  AST  287<H>  /  ALT  272<H>  /  AlkPhos  162<H>  25    CARDIAC MARKERS ( 24 May 2021 14:50 )  0.046 ng/mL / x     / x     / x     / x      CARDIAC MARKERS ( 24 May 2021 12:12 )  <0.015 ng/mL / x     / x     / x     / x        PT/INR - ( 24 May 2021 12:12 )   PT: 12.8 sec;   INR: 1.11 ratio       PTT - ( 24 May 2021 12:12 )  PTT:24.1 sec  Urinalysis Basic - ( 24 May 2021 15:04 )    Color: Yellow / Appearance: Clear / S.010 / pH: x  Gluc: x / Ketone: Negative  / Bili: Negative / Urobili: Negative mg/dL   Blood: x / Protein: 15 mg/dL / Nitrite: Negative   Leuk Esterase: Negative / RBC: 6-10 /HPF / WBC 0-2   Sq Epi: x / Non Sq Epi: Occasional / Bacteria: Occasional    I&O's Summary    24 May 2021 07:01  -  25 May 2021 07:00  --------------------------------------------------------  IN: 1544 mL / OUT: 416 mL / NET: 1128 mL    BNP  RADIOLOGY & ADDITIONAL STUDIES:  ch< from: Xray Chest 1 View- PORTABLE-Urgent (21 @ 11:41) >  EXAM:  XR CHEST PORTABLE URGENT 1V                            PROCEDURE DATE:  2021      INTERPRETATION:  AP erect chest on May 24, 2021 at 11:38 AM. Patient has chest pain.  Heart is magnified by technique.  Presently there is a leftloop recorder new since .  Lungs remain clear.  Bilateral shoulder degeneration noted.  IMPRESSION: Left loop recorder presently seen. No acute finding or change.    JEM PARHAM MD; Attending Radiologist  This document has been electronically signed. May 24 2021  3:31PM    < end of copied text >  < from: TTE Echo Complete w/o Contrast w/ Doppler (21 @ 08:32) >   Impression     Summary     Mild to Moderate mitral regurgitation is present.   Fibrocalcific changes noted to the mitral valve leaflets with preserved   leaflet excursion.   Mild mitral annular calcification is present.   Mild to Moderate mitral regurgitation is present.   EA reversal of the mitral inflow consistent with reduced compliance of the   left ventricle   There are fibrocalcific changes noted to the aortic valve leaflets with   restriction in leaflet excursion. Transaortic gradients are slightly   elevated but do not meet the criteria for aortic stenosis. No aortic   insufficiency noted.   Trace to mild tricuspid valve regurgitation is present.   Normal appearing pulmonic valve structure and function.   Normal appearing left atrium.   The left ventricle is normal in size, wall thickness, wall motion and   contractility.   Estimated left ventricular ejection fraction is 55-60 %.     Signature     ----------------------------------------------------------------   Electronically signed by Laney Clark MD(Interpreting   physician) on 2021 05:31 PM   ----------------------------------------------------------------    < from: Transesophageal Echocardiogram (05.15.21 @ 13:36) >   Mild (1+) mitral regurgitation is present.   Trace tricuspid valve regurgitation is present.   Mild aortic sclerosis is present with normal valvular opening.   Normal appearing pulmonic valve structure and function.   The left atrium is mildly dilated.   The left ventricle is normal in size, wall thickness, wall motion and   contractility.   Normal appearing right ventricle structure and function.   Normal appearing right atrium.   No intracardiac masses, thrombi or vegetations were seen.   Mild atheroscleorsis of the thoracic aorta.   No PFO/ASD.    < end of copied text >

## 2021-05-26 NOTE — DIETITIAN INITIAL EVALUATION ADULT. - WEIGHT FOR BMI (LBS)
Comprehensive Nutrition Assessment    Type and Reason for Visit:  Initial, Consult(General Nutrition Management - Malnourishment, Chronic Alcoholism)    Nutrition Recommendations/Plan: Continue current diet. Will provide Ensure Enlive and frozen Magic Cups with meals. Encourage/monitor intakes. Monitor need for nutrition support. Nutrition Assessment:  Consulted for general nutrition management - malnourishment, chronic alcoholism. Pt admitted as a transfer from [address] [place] [address] with poor oral intakes. Pt passed a bedside swallow study on 2/16 and has been started on a Dysphagia 2 diet with thin liquids. Will provide ONS with meals. Pt with a DTI to her coccyx. Estimated Daily Nutrient Needs:  Energy (kcal):  25-28 kcal/kg = 8217-2140 kcals/day; Weight Used for Energy Requirements:  Admission     Protein (g):  1.2-1.5 gm/kg = 60-75 gm pro/day; Weight Used for Protein Requirements:  Ideal          Nutrition Related Findings:  Labs: K 3.6 mmol/L. Meds: Folic Acid, Lasix, Humalog, Thiamine. BM 2/11.       Wounds:  Deep Tissue Injury(to coccyx)       Current Nutrition Therapies:    DIET DYSPHAGIA MINCED AND MOIST; No Added Salt (3-4 GM)    Anthropometric Measures:  · Height: 5' 2\" (157.5 cm)  · Current Body Weight: 130 lb 1.1 oz (59 kg)   · Admission Body Weight: 131 lb 2.8 oz (59.5 kg)    · Usual Body Weight: 143 lb 1.6 oz (64.9 kg)(8/20/20 per EHR review)     · Ideal Body Weight: 110 lbs; % Ideal Body Weight 118.2 %   · BMI: 23.8  · BMI Categories: Normal Weight (BMI 22.0 to 24.9) age over 72       Nutrition Diagnosis:   · Inadequate oral intake related to (decreased appetite, current medical condition) as evidenced by poor intake prior to admission, intake 0-25%, weight loss(need for ONS)    Nutrition Interventions:   Food and/or Nutrient Delivery:  Continue Current Diet, Start Oral Nutrition Supplement  Nutrition Education/Counseling:  No recommendation at this time   Coordination of Nutrition Care: Continue to monitor while inpatient    Goals:  Oral intakes to meet at least 50% of estimated nutrition needs.        Nutrition Monitoring and Evaluation:   Food/Nutrient Intake Outcomes:  Food and Nutrient Intake, Supplement Intake  Physical Signs/Symptoms Outcomes:  Biochemical Data, GI Status, Nutrition Focused Physical Findings, Skin, Weight     Electronically signed by Jobe Rubinstein, RD, LD on 2/17/21 at 4:03 PM EST    Contact: [phone] 179.8

## 2021-05-26 NOTE — PROGRESS NOTE ADULT - ATTENDING COMMENTS
87 year old man with stroke, now new fevers and bump of LFT's.   U/S with large sludge-ball but duct non-dilated, no signs of cholecystitis.   Continue abx as lft's downtrending.   If gets high fevers, worsening white count, would advocate for cholecystostomy tube despite plavix.

## 2021-05-26 NOTE — DIETITIAN INITIAL EVALUATION ADULT. - PERTINENT LABORATORY DATA
05-26    139  |  110<H>  |  20  ----------------------------<  108<H>  3.8   |  23  |  0.78    Ca    8.9      26 May 2021 06:15  Phos  3.3     05-25  Mg     1.7     05-25    TPro  5.7<L>  /  Alb  2.4<L>  /  TBili  0.9  /  DBili  x   /  AST  114<H>  /  ALT  160<H>  /  AlkPhos  143<H>  05-26  BMI: BMI (kg/m2): 23.7 (05-24-21 @ 11:22)  HbA1c: A1C with Estimated Average Glucose Result: 6.4 % (05-25-21 @ 06:17)    Glucose: POCT Blood Glucose.: 139 mg/dL (05-24-21 @ 12:29)    BP: 125/60 (05-26-21 @ 08:00) (91/44 - 151/96)  Lipid Panel: Date/Time: 05-25-21 @ 06:17  Cholesterol, Serum: 62  Direct LDL: --  HDL Cholesterol, Serum: 41  Total Cholesterol/HDL Ration Measurement: --  Triglycerides, Serum: 60

## 2021-05-26 NOTE — PHARMACOTHERAPY INTERVENTION NOTE - COMMENTS
Based on the patient’s renal function (CrCl =75 mL/min), would recommend changing dose of cefepime from 2g Q24H to 2g Q12H.

## 2021-05-26 NOTE — DIETITIAN INITIAL EVALUATION ADULT. - MALNUTRITION
severe malnutrition in chronic illness severe malnutrition in chronic illness r/t decreased PO intake s/p CVA AEB severe muscle/fat wasting

## 2021-05-26 NOTE — PROGRESS NOTE ADULT - ASSESSMENT
88 yo male with hx of recent stroke, and in ED with acute stroke and was not a TPA candidate given his recent stroke.      Consulted for concerns for acute cholecystitis w/ elevated lfts and patient with fevers/sepsis/bacteremia.     On PE, abdomen is not tender however ct scan did note no gall stones, sonogram noted to have gallbladder mass which is likely tumefactive sludge.      At this time, lfts and fevers are slowly improving, would hold off on c - tube as patient is on asa and plavix.  However, if lfts/fevers worsen despite abx, will likely need c-tube in IR?      Discussed with ICU team, Dr. Thrasher/Benjie.

## 2021-05-27 LAB
ANION GAP SERPL CALC-SCNC: 6 MMOL/L — SIGNIFICANT CHANGE UP (ref 5–17)
BUN SERPL-MCNC: 22 MG/DL — SIGNIFICANT CHANGE UP (ref 7–23)
CALCIUM SERPL-MCNC: 9.3 MG/DL — SIGNIFICANT CHANGE UP (ref 8.5–10.1)
CHLORIDE SERPL-SCNC: 109 MMOL/L — HIGH (ref 96–108)
CO2 SERPL-SCNC: 25 MMOL/L — SIGNIFICANT CHANGE UP (ref 22–31)
CREAT SERPL-MCNC: 0.8 MG/DL — SIGNIFICANT CHANGE UP (ref 0.5–1.3)
GLUCOSE SERPL-MCNC: 134 MG/DL — HIGH (ref 70–99)
HCT VFR BLD CALC: 41.1 % — SIGNIFICANT CHANGE UP (ref 39–50)
HGB BLD-MCNC: 14 G/DL — SIGNIFICANT CHANGE UP (ref 13–17)
MCHC RBC-ENTMCNC: 30.5 PG — SIGNIFICANT CHANGE UP (ref 27–34)
MCHC RBC-ENTMCNC: 34.1 GM/DL — SIGNIFICANT CHANGE UP (ref 32–36)
MCV RBC AUTO: 89.5 FL — SIGNIFICANT CHANGE UP (ref 80–100)
PLATELET # BLD AUTO: 124 K/UL — LOW (ref 150–400)
POTASSIUM SERPL-MCNC: 4.1 MMOL/L — SIGNIFICANT CHANGE UP (ref 3.5–5.3)
POTASSIUM SERPL-SCNC: 4.1 MMOL/L — SIGNIFICANT CHANGE UP (ref 3.5–5.3)
RBC # BLD: 4.59 M/UL — SIGNIFICANT CHANGE UP (ref 4.2–5.8)
RBC # FLD: 13.4 % — SIGNIFICANT CHANGE UP (ref 10.3–14.5)
SODIUM SERPL-SCNC: 140 MMOL/L — SIGNIFICANT CHANGE UP (ref 135–145)
WBC # BLD: 10.22 K/UL — SIGNIFICANT CHANGE UP (ref 3.8–10.5)
WBC # FLD AUTO: 10.22 K/UL — SIGNIFICANT CHANGE UP (ref 3.8–10.5)

## 2021-05-27 PROCEDURE — 99233 SBSQ HOSP IP/OBS HIGH 50: CPT

## 2021-05-27 PROCEDURE — 99232 SBSQ HOSP IP/OBS MODERATE 35: CPT

## 2021-05-27 PROCEDURE — 95720 EEG PHY/QHP EA INCR W/VEEG: CPT

## 2021-05-27 RX ORDER — CEFTRIAXONE 500 MG/1
2000 INJECTION, POWDER, FOR SOLUTION INTRAMUSCULAR; INTRAVENOUS EVERY 24 HOURS
Refills: 0 | Status: DISCONTINUED | OUTPATIENT
Start: 2021-05-27 | End: 2021-06-02

## 2021-05-27 RX ADMIN — CLOPIDOGREL BISULFATE 75 MILLIGRAM(S): 75 TABLET, FILM COATED ORAL at 09:53

## 2021-05-27 RX ADMIN — CEFEPIME 100 MILLIGRAM(S): 1 INJECTION, POWDER, FOR SOLUTION INTRAMUSCULAR; INTRAVENOUS at 05:06

## 2021-05-27 RX ADMIN — CEFTRIAXONE 2000 MILLIGRAM(S): 500 INJECTION, POWDER, FOR SOLUTION INTRAMUSCULAR; INTRAVENOUS at 10:19

## 2021-05-27 RX ADMIN — HEPARIN SODIUM 5000 UNIT(S): 5000 INJECTION INTRAVENOUS; SUBCUTANEOUS at 05:06

## 2021-05-27 RX ADMIN — ATORVASTATIN CALCIUM 80 MILLIGRAM(S): 80 TABLET, FILM COATED ORAL at 21:01

## 2021-05-27 RX ADMIN — HEPARIN SODIUM 5000 UNIT(S): 5000 INJECTION INTRAVENOUS; SUBCUTANEOUS at 13:43

## 2021-05-27 RX ADMIN — Medication 81 MILLIGRAM(S): at 09:53

## 2021-05-27 RX ADMIN — CHLORHEXIDINE GLUCONATE 1 APPLICATION(S): 213 SOLUTION TOPICAL at 05:06

## 2021-05-27 RX ADMIN — PANTOPRAZOLE SODIUM 40 MILLIGRAM(S): 20 TABLET, DELAYED RELEASE ORAL at 09:53

## 2021-05-27 RX ADMIN — HEPARIN SODIUM 5000 UNIT(S): 5000 INJECTION INTRAVENOUS; SUBCUTANEOUS at 21:01

## 2021-05-27 NOTE — PROGRESS NOTE ADULT - ASSESSMENT
Recurrent CVA- discussed with neurology team and Dr Martínez ICU team.  Loop recorder interrogation did not reveal any afib.  No evidence of afib on tele. 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  Spoke with pts wife Mrs Noland.    on Aspirin   Management per primary team and neurology team.    Hyperlipidemia- continue statin.    HTN- BP optimal.    Sepsis- Management per primary team.     Other medical issues- Management per primary team.   Thank you for allowing me to participate in the care of this patient. Please feel free to contact me with any questions.

## 2021-05-27 NOTE — PROGRESS NOTE ADULT - SUBJECTIVE AND OBJECTIVE BOX
Events Overnight: no change in neuro exam, afebrile, bp ok, t max 99    HPI:      Patient was brought in from home with fever, weakness nausea and vomitting,  Patient was in hospital one week PTA with acute stroke      In ED had acute stroke with right sided weakness, no large vessel occlusion, did not giet TPA because of stroke 1 week prior      Had loop recorder was interogated no afib      Had FABIO 5/15 and was on aspirin, and plavix       MRI last weak no vascular occlusion      last week MRI showed lacunar infarcts. MRI 5/25 watershed infarcts      was on aspirin and plavix PTA      Blood cultures were positive for ecoli, sensitive to cefepime      abdominal workup only shows sludge in gb  ,  ROS cant obtain secondary to aphasia    PMH:            Rheumatoid arthritis        DiverticulitisC        CVA        History of hernia repair        History of appendectomy        loop recorder       MEDICATIONS  (STANDING):  aspirin  chewable 81 milliGRAM(s) Oral daily  atorvastatin 80 milliGRAM(s) Oral at bedtime  cefepime   IVPB 2000 milliGRAM(s) IV Intermittent every 12 hours  chlorhexidine 4% Liquid 1 Application(s) Topical <User Schedule>  clopidogrel Tablet 75 milliGRAM(s) Oral daily  heparin   Injectable 5000 Unit(s) SubCutaneous every 8 hours  pantoprazole  Injectable 40 milliGRAM(s) IV Push daily    MEDICATIONS  (PRN):    ICU Vital Signs Last 24 Hrs  T(C): 37.3 (27 May 2021 06:00), Max: 37.3 (27 May 2021 02:00)  T(F): 99.1 (27 May 2021 06:00), Max: 99.1 (27 May 2021 02:00)  HR: 82 (27 May 2021 07:00) (76 - 98)  BP: 129/60 (27 May 2021 07:00) (121/55 - 173/80)  BP(mean): 78 (27 May 2021 07:00) (71 - 110)  ABP: --  ABP(mean): --  RR: 20 (27 May 2021 07:00) (15 - 23)  SpO2: 93% (27 May 2021 07:00) (93% - 97%)    I&O's Summary    26 May 2021 07:01  -  27 May 2021 07:00  --------------------------------------------------------  IN: 1159 mL / OUT: 3050 mL / NET: -1891 mL                          12.6   16.11 )-----------( 107      ( 26 May 2021 06:15 )             36.9       05-26    139  |  110<H>  |  20  ----------------------------<  108<H>  3.8   |  23  |  0.78    Ca    8.9      26 May 2021 06:15    TPro  5.7<L>  /  Alb  2.4<L>  /  TBili  0.9  /  DBili  x   /  AST  114<H>  /  ALT  160<H>  /  AlkPhos  143<H>  05-26    DVT Prophylaxis:  Heparin subq                                                                Advanced Directives: Full Code

## 2021-05-27 NOTE — PROGRESS NOTE ADULT - SUBJECTIVE AND OBJECTIVE BOX
· Reason for Admission	CVA/ Sepsis  5/27/21 : Pt awake, aphasic, follows commands moving left side, Rt side weakness persisting. MRI done. EEG monitoring in progress.    MEDICATIONS  (STANDING):  aspirin  chewable 81 milliGRAM(s) Oral daily  atorvastatin 80 milliGRAM(s) Oral at bedtime  cefepime   IVPB 2000 milliGRAM(s) IV Intermittent every 12 hours  chlorhexidine 4% Liquid 1 Application(s) Topical <User Schedule>  clopidogrel Tablet 75 milliGRAM(s) Oral daily  heparin   Injectable 5000 Unit(s) SubCutaneous every 8 hours  pantoprazole  Injectable 40 milliGRAM(s) IV Push daily      Vital Signs Last 24 Hrs  T(C): 37.3 (27 May 2021 06:00), Max: 37.3 (27 May 2021 02:00)  T(F): 99.1 (27 May 2021 06:00), Max: 99.1 (27 May 2021 02:00)  HR: 82 (27 May 2021 07:00) (76 - 98)  BP: 129/60 (27 May 2021 07:00) (121/55 - 173/80)  BP(mean): 78 (27 May 2021 07:00) (71 - 110)  RR: 20 (27 May 2021 07:00) (15 - 23)  SpO2: 93% (27 May 2021 07:00) (93% - 97%)    Neurological exam:  More alert, follows commands  Expressive aphasia follows commands moving left side   PERRL, EOMI,  right facial droop.   RUE 0/5, RLE 0-2/5 LUE 5/5 LLE 5/5   sensation mildly diminished on right.   Reflexes +2 Rt brisk +2 left UE Plantars Up going Rt down going Left  TED not able to test on RT   Gait Unable to test    NIHSS: 13                        12.6   16.11 )-----------( 107      ( 26 May 2021 06:15 )             36.9     05-26    139  |  110<H>  |  20  ----------------------------<  108<H>  3.8   |  23  |  0.78    Ca    8.9      26 May 2021 06:15    TPro  5.7<L>  /  Alb  2.4<L>  /  TBili  0.9  /  DBili  x   /  AST  114<H>  /  ALT  160<H>  /  AlkPhos  143<H>  05-26 05-25 Chol 62 LDL -- HDL 41 Trig 60, 05-14 Chol 119 LDL -- HDL 47 Trig 96    Radiology report:  - CT Head  < from: CT Head No Cont (05.25.21 @ 09:25) >  IMPRESSION: Area of hypodensity in the left parietal occipital region suggesting acute/subacute infarct. No evidence of hemorrhagic transformation.    CT Brain from 5/24/21 :    1. The CT perfusion study suggests generalized hypoperfusion of both hemispheres as well as within the posterior fossa without a focal abnormality. No area of core infarction. See above.  2. Moderate calcified plaque right carotid bifurcation but without significant stenosis. Focal stenosis left internal carotid 1 cm above the bifurcation with no tandemlesion noted.  3. Patent vertebrals.  4. Widely patent vasculature noted intracranially. Mild scattered atherosclerotic changes. No occlusion or tight stenosis suggested.    Follow-up MR imaging of the brain may be considered for further assessment    < end of copied text >  < from: CT Brain Stroke Protocol (05.24.21 @ 12:39) >    1)  chronic ischemic changes in both hemispheres with volume loss and involutional change. No acute infarct, hemorrhage, or space occupying lesion suggested.  2)  follow-up MR imaging may be considered for further assessment.  - EEG  < from: EEG Awake or Drowsy (05.25.21 @ 15:00) >  Impression:  1.This is an abnormal EEG due to the presence of mild generalized background slowing may be on the basis of metabolic causes.  2. Marked left frontal and temporal slowing indicative of underlying structural pathology. Clinical correlation recommended.  3. No epileptiform activity noted. Clinical correlation recommended.    MRI BRAIN :  < from: MR Head No Cont (05.26.21 @ 14:23) >  IMPRESSION:    Numerous new areas of restricted diffusion in the leftfrontal, parietal, occipital, and temporal lobes, left basal ganglia, and right parietal lobe, suggesting acute infarcts. The left-sided infarcts have a watershed distribution. Areas of subacute infarct in the left parietal, occipital and temporal lobes are again seen. There are areas of susceptibility artifact associated with acute and subacute infarcts in the left parietal-occipital region, suggesting hemorrhagic transformation.

## 2021-05-27 NOTE — PROGRESS NOTE ADULT - ASSESSMENT
Patient with history of recent stroke  presented with fever, sepsis, bacteremia with E. coli, ? biliary  ct no gall stones, but sludge in  gb, slightly elevated lfts  In ED had Acute stroke, was not TPA candidate given recent stroke, watershed distribution on repeat MRI  no large vessel occlusion  was on aspirin , plavix  loop recorder interogated, no atrial fibrillatin    Plan:    Bacteremia, sepsis, with e. coli, on cefepime(S), no thickened wall, but tumefactic sludge in GB, day 3            bp, fever is better,  d/w GI no 9immediate neeed for perc, GB especially as patient is on plavix    CVA -   on aspirin , plavix, statin    needs PT    will ask speech swallow to see, receiving tube feeds, May need PEG  will need eventual palcement

## 2021-05-27 NOTE — PROGRESS NOTE ADULT - ASSESSMENT
· Assessment	  85 YO M with a PMH of RA, Diverticulitis, CVA in 3/2021 and CVA 5/14. In ER acutely developed RUE weakness and aphasia. Neutropenic with elevated lactic acid .    # Acute Left CVA with Rt Hemiparesis   -Not a TPA candidate due to Recent CVA and Pt's medical condition with Hypoxia and Sepsis-NO Intervention as no LVO  -Continue Aspirin with Statin and Plavix.  -Repeat CT showed evolving left CVA  -MRI brain when medically stable  -Speech and swallow eval  -Bed side PT.  -Cardiology follow up.  -EEG left > Rt slow . No epileptiform  activity  -Rehab when stable medically  # Sepsis   -Follow up with ID/ Antibiotics  -Discussed with Pt's wife , daughter and Dr. Martínez

## 2021-05-27 NOTE — PROGRESS NOTE ADULT - SUBJECTIVE AND OBJECTIVE BOX
Patient is a 87y old  Male who presents with a chief complaint of nausea, vomiting, acute stroke.       HPI:  87 yom with recent CVA presented with bacteremia and was noted to have recurrent CVA in the ER.  -   Pt seen this am . Pt is unable to answer questions.  He was unable to sit up in bed also.  Spoke to Dr Martínez.   - pt seen this am.  Pt sleeping soundly.  on NG tube.      PAST MEDICAL & SURGICAL HISTORY:  Rheumatoid arthritis    Diverticulitis    History of hernia repair    History of appendectomy        MEDICATIONS  (STANDING):  aspirin Suppository 300 milliGRAM(s) Rectal daily  atorvastatin 80 milliGRAM(s) Oral at bedtime  cefepime   IVPB 2000 milliGRAM(s) IV Intermittent every 24 hours  chlorhexidine 4% Liquid 1 Application(s) Topical <User Schedule>  heparin   Injectable 5000 Unit(s) SubCutaneous every 8 hours  lactated ringers. 1000 milliLiter(s) (75 mL/Hr) IV Continuous <Continuous>  pantoprazole  Injectable 40 milliGRAM(s) IV Push daily    MEDICATIONS  (PRN):      FAMILY HISTORY: unable to obtain from pt.       SOCIAL HISTORY: no recent smoking      REVIEW OF SYSTEMS:  CONSTITUTIONAL:    No fatigue, malaise, lethargy.  c/o fever or chills.  RESPIRATORY:  No cough.  No wheeze.  No hemoptysis.  No shortness of breath.  CARDIOVASCULAR:  No chest pains.  No palpitations. No shortness of breath, No orthopnea or PND.  GASTROINTESTINAL:  No abdominal pain.  No nausea or vomiting.    GENITOURINARY:    No hematuria.    MUSCULOSKELETAL:  No musculoskeletal pain.  No joint swelling.  No arthritis.  NEUROLOGICAL:  per HPI  PSYCHIATRIC:  No confusion  SKIN:  No rashes.            ICU Vital Signs Last 24 Hrs  T(C): 37.7 (27 May 2021 09:00), Max: 37.7 (27 May 2021 09:00)  T(F): 99.8 (27 May 2021 09:00), Max: 99.8 (27 May 2021 09:00)  HR: 89 (27 May 2021 08:00) (76 - 98)  BP: 141/64 (27 May 2021 08:00) (124/56 - 173/80)  BP(mean): 84 (27 May 2021 08:00) (73 - 110)  ABP: --  ABP(mean): --  RR: 15 (27 May 2021 08:00) (15 - 23)  SpO2: 96% (27 May 2021 08:00) (93% - 97%)    PHYSICAL EXAM-    Constitutional:  no acute distress     Head: Head is normocephalic and atraumatic.      Neck: No JVD.     Cardiovascular: Regular rate and rhythm without S3, S4. No murmurs or rubs are appreciated.      Respiratory: Breath sounds are normal. No rales. No wheezing.    Abdomen: Soft, nontender, nondistended with positive bowel sounds.      Extremity: No tenderness. No  pitting edema     Neurologic: The patient is sleeping      Skin: No rash, no obvious lesions noted.      Psychiatric: The patient appears to be emotionally stable.      INTERPRETATION OF TELEMETRY: sinus rythm    ECG: Sinus rythm , RBBB    I&O's Detail    24 May 2021 07:01  -  25 May 2021 07:00  --------------------------------------------------------  IN:    IV PiggyBack: 400 mL    Lactated Ringers: 144 mL    Lactated Ringers Bolus: 1000 mL  Total IN: 1544 mL    OUT:    Indwelling Catheter - Urethral (mL): 416 mL  Total OUT: 416 mL    Total NET: 1128 mL          LABS:                        12.3   20.80 )-----------( 138      ( 25 May 2021 06:17 )             37.3     05-25    141  |  111<H>  |  17  ----------------------------<  86  4.0   |  24  |  1.18    Ca    8.4<L>      25 May 2021 06:17  Phos  3.3     05-25  Mg     1.7         TPro  6.0  /  Alb  2.6<L>  /  TBili  1.0  /  DBili  x   /  AST  287<H>  /  ALT  272<H>  /  AlkPhos  162<H>  05    CARDIAC MARKERS ( 24 May 2021 14:50 )  0.046 ng/mL / x     / x     / x     / x      CARDIAC MARKERS ( 24 May 2021 12:12 )  <0.015 ng/mL / x     / x     / x     / x          PT/INR - ( 24 May 2021 12:12 )   PT: 12.8 sec;   INR: 1.11 ratio         PTT - ( 24 May 2021 12:12 )  PTT:24.1 sec  Urinalysis Basic - ( 24 May 2021 15:04 )    Color: Yellow / Appearance: Clear / S.010 / pH: x  Gluc: x / Ketone: Negative  / Bili: Negative / Urobili: Negative mg/dL   Blood: x / Protein: 15 mg/dL / Nitrite: Negative   Leuk Esterase: Negative / RBC: 6-10 /HPF / WBC 0-2   Sq Epi: x / Non Sq Epi: Occasional / Bacteria: Occasional      I&O's Summary    24 May 2021 07:01  -  25 May 2021 07:00  --------------------------------------------------------  IN: 1544 mL / OUT: 416 mL / NET: 1128 mL      BNP  RADIOLOGY & ADDITIONAL STUDIES:  ch< from: Xray Chest 1 View- PORTABLE-Urgent (21 @ 11:41) >  EXAM:  XR CHEST PORTABLE URGENT 1V                            PROCEDURE DATE:  2021          INTERPRETATION:  AP erect chest on May 24, 2021 at 11:38 AM. Patient has chest pain.    Heart is magnified by technique.    Presently there is a leftloop recorder new since .    Lungs remain clear.    Bilateral shoulder degeneration noted.    IMPRESSION: Left loop recorder presently seen. No acute finding or change.            JEM PARHAM MD; Attending Radiologist  This document has been electronically signed. May 24 2021  3:31PM    < end of copied text >  < from: TTE Echo Complete w/o Contrast w/ Doppler (21 @ 08:32) >   Impression     Summary     Mild to Moderate mitral regurgitation is present.   Fibrocalcific changes noted to the mitral valve leaflets with preserved   leaflet excursion.   Mild mitral annular calcification is present.   Mild to Moderate mitral regurgitation is present.   EA reversal of the mitral inflow consistent with reduced compliance of the   left ventricle   There are fibrocalcific changes noted to the aortic valve leaflets with   restriction in leaflet excursion. Transaortic gradients are slightly   elevated but do not meet the criteria for aortic stenosis. No aortic   insufficiency noted.   Trace to mild tricuspid valve regurgitation is present.   Normal appearing pulmonic valve structure and function.   Normal appearing left atrium.   The left ventricle is normal in size, wall thickness, wall motion and   contractility.   Estimated left ventricular ejection fraction is 55-60 %.     Signature     ----------------------------------------------------------------   Electronically signed by Laney Clark MD(Interpreting   physician) on 2021 05:31 PM   ----------------------------------------------------------------    < end of copied text >

## 2021-05-28 LAB
ALBUMIN SERPL ELPH-MCNC: 2.5 G/DL — LOW (ref 3.3–5)
ALP SERPL-CCNC: 197 U/L — HIGH (ref 40–120)
ALT FLD-CCNC: 118 U/L — HIGH (ref 12–78)
ANION GAP SERPL CALC-SCNC: 5 MMOL/L — SIGNIFICANT CHANGE UP (ref 5–17)
AST SERPL-CCNC: 67 U/L — HIGH (ref 15–37)
BILIRUB SERPL-MCNC: 0.4 MG/DL — SIGNIFICANT CHANGE UP (ref 0.2–1.2)
BUN SERPL-MCNC: 26 MG/DL — HIGH (ref 7–23)
CALCIUM SERPL-MCNC: 9.3 MG/DL — SIGNIFICANT CHANGE UP (ref 8.5–10.1)
CHLORIDE SERPL-SCNC: 107 MMOL/L — SIGNIFICANT CHANGE UP (ref 96–108)
CO2 SERPL-SCNC: 26 MMOL/L — SIGNIFICANT CHANGE UP (ref 22–31)
CREAT SERPL-MCNC: 0.87 MG/DL — SIGNIFICANT CHANGE UP (ref 0.5–1.3)
GLUCOSE SERPL-MCNC: 147 MG/DL — HIGH (ref 70–99)
HCT VFR BLD CALC: 40.1 % — SIGNIFICANT CHANGE UP (ref 39–50)
HCYS SERPL-MCNC: 15.4 UMOL/L — HIGH
HGB BLD-MCNC: 13.5 G/DL — SIGNIFICANT CHANGE UP (ref 13–17)
MCHC RBC-ENTMCNC: 30.3 PG — SIGNIFICANT CHANGE UP (ref 27–34)
MCHC RBC-ENTMCNC: 33.7 GM/DL — SIGNIFICANT CHANGE UP (ref 32–36)
MCV RBC AUTO: 90.1 FL — SIGNIFICANT CHANGE UP (ref 80–100)
PLATELET # BLD AUTO: 149 K/UL — LOW (ref 150–400)
POTASSIUM SERPL-MCNC: 4.5 MMOL/L — SIGNIFICANT CHANGE UP (ref 3.5–5.3)
POTASSIUM SERPL-SCNC: 4.5 MMOL/L — SIGNIFICANT CHANGE UP (ref 3.5–5.3)
PROT SERPL-MCNC: 6.5 GM/DL — SIGNIFICANT CHANGE UP (ref 6–8.3)
RBC # BLD: 4.45 M/UL — SIGNIFICANT CHANGE UP (ref 4.2–5.8)
RBC # FLD: 13.3 % — SIGNIFICANT CHANGE UP (ref 10.3–14.5)
SODIUM SERPL-SCNC: 138 MMOL/L — SIGNIFICANT CHANGE UP (ref 135–145)
WBC # BLD: 7.66 K/UL — SIGNIFICANT CHANGE UP (ref 3.8–10.5)
WBC # FLD AUTO: 7.66 K/UL — SIGNIFICANT CHANGE UP (ref 3.8–10.5)

## 2021-05-28 PROCEDURE — 99233 SBSQ HOSP IP/OBS HIGH 50: CPT

## 2021-05-28 PROCEDURE — 99232 SBSQ HOSP IP/OBS MODERATE 35: CPT

## 2021-05-28 RX ORDER — ACETAMINOPHEN 500 MG
650 TABLET ORAL EVERY 8 HOURS
Refills: 0 | Status: DISCONTINUED | OUTPATIENT
Start: 2021-05-28 | End: 2021-06-04

## 2021-05-28 RX ADMIN — PANTOPRAZOLE SODIUM 40 MILLIGRAM(S): 20 TABLET, DELAYED RELEASE ORAL at 09:01

## 2021-05-28 RX ADMIN — HEPARIN SODIUM 5000 UNIT(S): 5000 INJECTION INTRAVENOUS; SUBCUTANEOUS at 05:32

## 2021-05-28 RX ADMIN — CLOPIDOGREL BISULFATE 75 MILLIGRAM(S): 75 TABLET, FILM COATED ORAL at 09:00

## 2021-05-28 RX ADMIN — Medication 1 DROP(S): at 21:51

## 2021-05-28 RX ADMIN — CHLORHEXIDINE GLUCONATE 1 APPLICATION(S): 213 SOLUTION TOPICAL at 05:33

## 2021-05-28 RX ADMIN — ATORVASTATIN CALCIUM 80 MILLIGRAM(S): 80 TABLET, FILM COATED ORAL at 21:51

## 2021-05-28 RX ADMIN — HEPARIN SODIUM 5000 UNIT(S): 5000 INJECTION INTRAVENOUS; SUBCUTANEOUS at 15:38

## 2021-05-28 RX ADMIN — Medication 81 MILLIGRAM(S): at 09:00

## 2021-05-28 RX ADMIN — CEFTRIAXONE 2000 MILLIGRAM(S): 500 INJECTION, POWDER, FOR SOLUTION INTRAMUSCULAR; INTRAVENOUS at 10:11

## 2021-05-28 RX ADMIN — HEPARIN SODIUM 5000 UNIT(S): 5000 INJECTION INTRAVENOUS; SUBCUTANEOUS at 21:51

## 2021-05-28 NOTE — PROGRESS NOTE ADULT - SUBJECTIVE AND OBJECTIVE BOX
HPI: 87 y.o. 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    INTERVAL HPI: GIOVANNY UTO due to aphasia    Vital Signs Last 24 Hrs  T(C): 36.1 (28 May 2021 12:12), Max: 37.4 (27 May 2021 17:00)  T(F): 97 (28 May 2021 12:12), Max: 99.3 (27 May 2021 17:00)  HR: 97 (28 May 2021 13:00) (85 - 105)  BP: 136/61 (28 May 2021 13:00) (107/58 - 159/48)  BP(mean): 80 (28 May 2021 13:00) (66 - 87)  RR: 19 (28 May 2021 13:00) (13 - 24)  SpO2: 94% (28 May 2021 13:00) (91% - 95%)  I&O's Detail    27 May 2021 07:01  -  28 May 2021 07:00  --------------------------------------------------------  IN:    Glucerna 1.5: 1076 mL  Total IN: 1076 mL    OUT:    Incontinent per Collection Bag (mL): 1600 mL  Total OUT: 1600 mL    Total NET: -524 mL                       13.5   7.66  )-----------( 149      ( 28 May 2021 05:36 )             40.1     28 May 2021 05:36    138    |  107    |  26     ----------------------------<  147    4.5     |  26     |  0.87     Ca    9.3        28 May 2021 05:36    TPro  6.5    /  Alb  2.5    /  TBili  0.4    /  DBili  x      /  AST  67     /  ALT  118    /  AlkPhos  197    28 May 2021 05:36    CAPILLARY BLOOD GLUCOSE    LIVER FUNCTIONS - ( 28 May 2021 05:36 )  Alb: 2.5 g/dL / Pro: 6.5 gm/dL / ALK PHOS: 197 U/L / ALT: 118 U/L / AST: 67 U/L / GGT: x             MEDICATIONS  (STANDING):  aspirin  chewable 81 milliGRAM(s) Oral daily  atorvastatin 80 milliGRAM(s) Oral at bedtime  cefTRIAXone Injectable. 2000 milliGRAM(s) IV Push every 24 hours  chlorhexidine 4% Liquid 1 Application(s) Topical <User Schedule>  clopidogrel Tablet 75 milliGRAM(s) Oral daily  heparin   Injectable 5000 Unit(s) SubCutaneous every 8 hours  pantoprazole  Injectable 40 milliGRAM(s) IV Push daily    Radiology: personally visualized    PHYSICAL EXAM:    General: male in no acute distress  Eyes: conjunctiva and sclera clear  Head: Normocephalic; atraumatic, BL temporal wasting  ENMT: corpak in place  Neck: Supple; non tender; no masses  Respiratory: No wheezes, rales or rhonchi  Cardiovascular: S1, S2 reg  Gastrointestinal: Soft non-tender non-distended; Normal bowel sounds  Genitourinary: No costovertebral angle tenderness  Extremities BL rheumatoid hands deformities, no cyanosis or edema  Vascular: Peripheral pulses palpable 2+ bilaterally  Neurological: Alert, aphasic with left sided NLFd and Right HP, follows commands after delay  Skin: Warm and dry.   Musculoskeletal: Right HP

## 2021-05-28 NOTE — PROGRESS NOTE ADULT - SUBJECTIVE AND OBJECTIVE BOX
· Reason for Admission	CVA/ Rt side weakness  5/28/21 : Pt awake, aphasic, with Rt side weakness . On NGT feedings .     MEDICATIONS  (STANDING):  aspirin  chewable 81 milliGRAM(s) Oral daily  atorvastatin 80 milliGRAM(s) Oral at bedtime  cefTRIAXone Injectable. 2000 milliGRAM(s) IV Push every 24 hours  chlorhexidine 4% Liquid 1 Application(s) Topical <User Schedule>  clopidogrel Tablet 75 milliGRAM(s) Oral daily  heparin   Injectable 5000 Unit(s) SubCutaneous every 8 hours  pantoprazole  Injectable 40 milliGRAM(s) IV Push daily    Vital Signs Last 24 Hrs  T(C): 36.9 (28 May 2021 04:55), Max: 37.4 (27 May 2021 17:00)  T(F): 98.4 (28 May 2021 04:55), Max: 99.3 (27 May 2021 17:00)  HR: 88 (28 May 2021 10:00) (81 - 105)  BP: 120/51 (28 May 2021 10:00) (107/58 - 159/48)  BP(mean): 68 (28 May 2021 10:00) (66 - 87)  RR: 21 (28 May 2021 10:00) (13 - 24)  SpO2: 93% (28 May 2021 10:00) (91% - 97%)    Neurological exam:  More alert, follows commands  Expressive aphasia follows commands moving left side   PERRL, EOMI,  right facial droop.   RUE 0/5, RLE 0-2/5 LUE 5/5 LLE 5/5   sensation mildly diminished on right.   Reflexes +2 Rt brisk +2 left UE Plantars Up going Rt down going Left  TED not able to test on RT   Gait Unable to test    NIHSS: 13                          13.5   7.66  )-----------( 149      ( 28 May 2021 05:36 )             40.1     05-28    138  |  107  |  26<H>  ----------------------------<  147<H>  4.5   |  26  |  0.87    Ca    9.3      28 May 2021 05:36    TPro  6.5  /  Alb  2.5<L>  /  TBili  0.4  /  DBili  x   /  AST  67<H>  /  ALT  118<H>  /  AlkPhos  197<H>  05-28 05-25 Chol 62 LDL -- HDL 41 Trig 60, 05-14 Chol 119 LDL -- HDL 47 Trig 96    Radiology report:  - CT Head  < from: CT Head No Cont (05.25.21 @ 09:25) >  IMPRESSION: Area of hypodensity in the left parietal occipital region suggesting acute/subacute infarct. No evidence of hemorrhagic transformation.    CT Brain from 5/24/21 :    1. The CT perfusion study suggests generalized hypoperfusion of both hemispheres as well as within the posterior fossa without a focal abnormality. No area of core infarction. See above.  2. Moderate calcified plaque right carotid bifurcation but without significant stenosis. Focal stenosis left internal carotid 1 cm above the bifurcation with no tandemlesion noted.  3. Patent vertebrals.  4. Widely patent vasculature noted intracranially. Mild scattered atherosclerotic changes. No occlusion or tight stenosis suggested.    Follow-up MR imaging of the brain may be considered for further assessment    < end of copied text >  < from: CT Brain Stroke Protocol (05.24.21 @ 12:39) >    1)  chronic ischemic changes in both hemispheres with volume loss and involutional change. No acute infarct, hemorrhage, or space occupying lesion suggested.  2)  follow-up MR imaging may be considered for further assessment.  - EEG  < from: EEG Awake or Drowsy (05.25.21 @ 15:00) >  Impression:  1.This is an abnormal EEG due to the presence of mild generalized background slowing may be on the basis of metabolic causes.  2. Marked left frontal and temporal slowing indicative of underlying structural pathology. Clinical correlation recommended.  3. No epileptiform activity noted. Clinical correlation recommended.  MRI BRAIN :  < from: MR Head No Cont (05.26.21 @ 14:23) >  IMPRESSION:    Numerous new areas of restricted diffusion in the leftfrontal, parietal, occipital, and temporal lobes, left basal ganglia, and right parietal lobe, suggesting acute infarcts. The left-sided infarcts have a watershed distribution. Areas of subacute infarct in the left parietal, occipital and temporal lobes are again seen. There are areas of susceptibility artifact associated with acute and subacute infarcts in the left parietal-occipital region, suggesting hemorrhagic transformation.    EEG Monitoring :  < from: EEG w/ Video Each 12-26 Hours, Unmonitored (05.27.21 @ 10:15) >  Impression:  This is an abnormal prolonged video EEG due to the presence of   Mild generalized slowing with superimposed moderate to severe slowing on the left side indicative of underlying structural pathology. Clinical correlation recommended.  No epileptiform activity noted. Dr. Martínez informed of the results.

## 2021-05-28 NOTE — PROGRESS NOTE ADULT - ASSESSMENT
87 y.o. 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    1. Recurrent embolic CVA with unclear etiology - needs hypercoagulable w/u and in the settings of bacteremia would repeat FABIO to r/o septic emboli   - discussed with neurology - will discuss with cardiology   - no AF on ILR interrogation   - cont ASA/plavix/statin   - PT, diet as per SS - if tolerates will remove corpak   - unclear source of bacteremia - cont IV abx, ID eval, possible FABIO, repeat BCx, neg EEGs    2. GB sludge w/o evidence of cholecystitis    3. Hx of RA off humira     4. VTE proph - UFH    5. Gi proph - PPI    Discussed with family, Neurology, SS, CICU team

## 2021-05-28 NOTE — PROGRESS NOTE ADULT - SUBJECTIVE AND OBJECTIVE BOX
Patient is a 87y old  Male who presents with a chief complaint of nausea, vomiting, acute stroke.       HPI:  87 yom with recent CVA presented with bacteremia and was noted to have recurrent CVA in the ER.  -   Pt seen this am . Pt is unable to answer questions.  He was unable to sit up in bed also.  Spoke to Dr Martínez.   - pt seen this am.  Pt sleeping soundly.  on NG tube.    - pt seen this am.  Pt denies any CP or SOB.  He is much more alert and he was unable to speak.  NG tube in place.        PAST MEDICAL & SURGICAL HISTORY:  Rheumatoid arthritis    Diverticulitis    History of hernia repair    History of appendectomy        MEDICATIONS  (STANDING):  aspirin Suppository 300 milliGRAM(s) Rectal daily  atorvastatin 80 milliGRAM(s) Oral at bedtime  cefepime   IVPB 2000 milliGRAM(s) IV Intermittent every 24 hours  chlorhexidine 4% Liquid 1 Application(s) Topical <User Schedule>  heparin   Injectable 5000 Unit(s) SubCutaneous every 8 hours  lactated ringers. 1000 milliLiter(s) (75 mL/Hr) IV Continuous <Continuous>  pantoprazole  Injectable 40 milliGRAM(s) IV Push daily    MEDICATIONS  (PRN):      FAMILY HISTORY: unable to obtain from pt.       SOCIAL HISTORY: no recent smoking      REVIEW OF SYSTEMS:  CONSTITUTIONAL:    No fatigue, malaise, lethargy.  c/o fever or chills.  RESPIRATORY:  No cough.  No wheeze.  No hemoptysis.  No shortness of breath.  CARDIOVASCULAR:  No chest pains.  No palpitations. No shortness of breath, No orthopnea or PND.  GASTROINTESTINAL:  No abdominal pain.  No nausea or vomiting.    GENITOURINARY:    No hematuria.    MUSCULOSKELETAL:  No musculoskeletal pain.  No joint swelling.  No arthritis.  NEUROLOGICAL:  per HPI  PSYCHIATRIC:  No confusion  SKIN:  No rashes.            ICU Vital Signs Last 24 Hrs  T(C): 36.9 (28 May 2021 04:55), Max: 37.7 (27 May 2021 09:00)  T(F): 98.4 (28 May 2021 04:55), Max: 99.8 (27 May 2021 09:00)  HR: 90 (28 May 2021 06:00) (81 - 105)  BP: 129/61 (28 May 2021 06:00) (107/58 - 159/48)  BP(mean): 77 (28 May 2021 06:00) (66 - 87)  ABP: --  ABP(mean): --  RR: 22 (28 May 2021 06:00) (13 - 25)  SpO2: 95% (28 May 2021 06:00) (91% - 97%)      PHYSICAL EXAM-    Constitutional:  no acute distress     Head: Head is normocephalic and atraumatic.      Neck: No JVD.     Cardiovascular: Regular rate and rhythm without S3, S4. No murmurs or rubs are appreciated.      Respiratory: Breath sounds are normal. No rales. No wheezing.    Abdomen: Soft, nontender, nondistended with positive bowel sounds.      Extremity: No tenderness. No  pitting edema     Neurologic: The patient is alert     Skin: No rash, no obvious lesions noted.      Psychiatric: The patient appears to be emotionally stable.      INTERPRETATION OF TELEMETRY: sinus rythm    ECG: Sinus rythm , RBBB    I&O's Detail    24 May 2021 07:01  -  25 May 2021 07:00  --------------------------------------------------------  IN:    IV PiggyBack: 400 mL    Lactated Ringers: 144 mL    Lactated Ringers Bolus: 1000 mL  Total IN: 1544 mL    OUT:    Indwelling Catheter - Urethral (mL): 416 mL  Total OUT: 416 mL    Total NET: 1128 mL          LABS:                        13.5   7.66  )-----------( 149      ( 28 May 2021 05:36 )             40.1         138  |  107  |  26<H>  ----------------------------<  147<H>  4.5   |  26  |  0.87    Ca    9.3      28 May 2021 05:36    TPro  6.5  /  Alb  2.5<L>  /  TBili  0.4  /  DBili  x   /  AST  67<H>  /  ALT  118<H>  /  AlkPhos  197<H>          LIVER FUNCTIONS - ( 28 May 2021 05:36 )  Alb: 2.5 g/dL / Pro: 6.5 gm/dL / ALK PHOS: 197 U/L / ALT: 118 U/L / AST: 67 U/L / GGT: x              Chol 62 LDL -- HDL 41 Trig 60,  Chol 119 LDL -- HDL 47 Trig 96      Color: Yellow / Appearance: Clear / S.010 / pH: x  Gluc: x / Ketone: Negative  / Bili: Negative / Urobili: Negative mg/dL   Blood: x / Protein: 15 mg/dL / Nitrite: Negative   Leuk Esterase: Negative / RBC: 6-10 /HPF / WBC 0-2   Sq Epi: x / Non Sq Epi: Occasional / Bacteria: Occasional      I&O's Summary    24 May 2021 07:  -  25 May 2021 07:00  --------------------------------------------------------  IN: 1544 mL / OUT: 416 mL / NET: 1128 mL      BNP  RADIOLOGY & ADDITIONAL STUDIES:  ch< from: Xray Chest 1 View- PORTABLE-Urgent (21 @ 11:41) >  EXAM:  XR CHEST PORTABLE URGENT 1V                            PROCEDURE DATE:  2021          INTERPRETATION:  AP erect chest on May 24, 2021 at 11:38 AM. Patient has chest pain.    Heart is magnified by technique.    Presently there is a leftloop recorder new since .    Lungs remain clear.    Bilateral shoulder degeneration noted.    IMPRESSION: Left loop recorder presently seen. No acute finding or change.            JEM PARHAM MD; Attending Radiologist  This document has been electronically signed. May 24 2021  3:31PM    < end of copied text >  < from: TTE Echo Complete w/o Contrast w/ Doppler (21 @ 08:32) >   Impression     Summary     Mild to Moderate mitral regurgitation is present.   Fibrocalcific changes noted to the mitral valve leaflets with preserved   leaflet excursion.   Mild mitral annular calcification is present.   Mild to Moderate mitral regurgitation is present.   EA reversal of the mitral inflow consistent with reduced compliance of the   left ventricle   There are fibrocalcific changes noted to the aortic valve leaflets with   restriction in leaflet excursion. Transaortic gradients are slightly   elevated but do not meet the criteria for aortic stenosis. No aortic   insufficiency noted.   Trace to mild tricuspid valve regurgitation is present.   Normal appearing pulmonic valve structure and function.   Normal appearing left atrium.   The left ventricle is normal in size, wall thickness, wall motion and   contractility.   Estimated left ventricular ejection fraction is 55-60 %.     Signature     ----------------------------------------------------------------   Electronically signed by Laney Clark MD(Interpreting   physician) on 2021 05:31 PM   ----------------------------------------------------------------    < end of copied text >  < from: TTE Echo Complete w/o Contrast w/ Doppler (21 @ 08:32) >   Impression     Summary     Mild to Moderate mitral regurgitation is present.   Fibrocalcific changes noted to the mitral valve leaflets with preserved   leaflet excursion.   Mild mitral annular calcification is present.   Mild to Moderate mitral regurgitation is present.   EA reversal of the mitral inflow consistent with reduced compliance of the   left ventricle   There are fibrocalcific changes noted to the aortic valve leaflets with   restriction in leaflet excursion. Transaortic gradients are slightly   elevated but do not meet the criteria for aortic stenosis. No aortic   insufficiency noted.   Trace to mild tricuspid valve regurgitation is present.   Normal appearing pulmonic valve structure and function.   Normal appearing left atrium.   The left ventricle is normal in size, wall thickness, wall motion and   contractility.   Estimated left ventricular ejection fraction is 55-60 %.     Signature     ----------------------------------------------------------------   Electronically signed by Laney Clark MD(Interpreting   physician) on 2021 05:31 PM   ----------------------------------------------------------------    < end of copied text >  r< from: MR Head No Cont (21 @ 14:23) >      IMPRESSION:    Numerous new areas of restricted diffusion in the leftfrontal, parietal, occipital, and temporal lobes, left basal ganglia, and right parietal lobe, suggesting acute infarcts. The left-sided infarcts have a watershed distribution. Areas of subacute infarct in the left parietal, occipital and temporal lobes are again seen. There are areas of susceptibility artifact associated with acute and subacute infarcts in the left parietal-occipital region, suggesting hemorrhagic transformation.    These findings were discussed with WIL Ferguson at 2021 3:05 PMby Dr. Africa Blackmon with read back confirmation.            AFRICA BLACKMON MD; Attending Radiologist  This document has been electronically signed. May 26 2021  3:10PM    < end of copied text >

## 2021-05-28 NOTE — PROGRESS NOTE ADULT - ASSESSMENT
· Assessment	  85 YO M with a PMH of RA, Diverticulitis, CVA in 3/2021 and CVA 5/14. In ER acutely developed RUE weakness and aphasia. Neutropenic with elevated lactic acid .    # Acute Left CVA with Rt Hemiparesis   -Not a TPA candidate due to Recent CVA and Pt's medical condition with Hypoxia and Sepsis-NO Intervention as no LVO  -Continue Aspirin with Statin and Plavix.  -Repeat CT showed evolving left CVA  -MRI brain when medically stable  -Speech and swallow eval  -Bed side PT.  -Cardiology follow up.  -EEG left > Rt slow . No epileptiform  activity   -EEG video monitoring No epileptiform activity left side slow> rt   -Rehab when stable medically  # Sepsis   -Follow up with ID/ Antibiotics  -Follow up with Dr. EPHRAIM Bashir from tomorrow.

## 2021-05-28 NOTE — PROGRESS NOTE ADULT - ASSESSMENT
Recurrent CVA-   Loop recorder interrogation did not reveal any afib.  No evidence of afib on tele. FABIO few weeks ago did not reveal any cardioembolic source of thrombus  on Aspirin   MRI results as stated above.  Management per primary team and neurology team.    Hyperlipidemia- continue statin.    HTN- BP optimal.    Sepsis- Management per primary team.     Other medical issues- Management per primary team.   Thank you for allowing me to participate in the care of this patient. Please feel free to contact me with any questions.

## 2021-05-29 LAB
ALBUMIN SERPL ELPH-MCNC: 2.8 G/DL — LOW (ref 3.3–5)
ALP SERPL-CCNC: 185 U/L — HIGH (ref 40–120)
ALT FLD-CCNC: 100 U/L — HIGH (ref 12–78)
ANION GAP SERPL CALC-SCNC: 4 MMOL/L — LOW (ref 5–17)
AST SERPL-CCNC: 50 U/L — HIGH (ref 15–37)
BILIRUB SERPL-MCNC: 0.5 MG/DL — SIGNIFICANT CHANGE UP (ref 0.2–1.2)
BUN SERPL-MCNC: 31 MG/DL — HIGH (ref 7–23)
CALCIUM SERPL-MCNC: 9.5 MG/DL — SIGNIFICANT CHANGE UP (ref 8.5–10.1)
CHLORIDE SERPL-SCNC: 106 MMOL/L — SIGNIFICANT CHANGE UP (ref 96–108)
CO2 SERPL-SCNC: 27 MMOL/L — SIGNIFICANT CHANGE UP (ref 22–31)
CREAT SERPL-MCNC: 0.82 MG/DL — SIGNIFICANT CHANGE UP (ref 0.5–1.3)
GLUCOSE SERPL-MCNC: 138 MG/DL — HIGH (ref 70–99)
HCT VFR BLD CALC: 42.1 % — SIGNIFICANT CHANGE UP (ref 39–50)
HGB BLD-MCNC: 13.9 G/DL — SIGNIFICANT CHANGE UP (ref 13–17)
MCHC RBC-ENTMCNC: 29.8 PG — SIGNIFICANT CHANGE UP (ref 27–34)
MCHC RBC-ENTMCNC: 33 GM/DL — SIGNIFICANT CHANGE UP (ref 32–36)
MCV RBC AUTO: 90.3 FL — SIGNIFICANT CHANGE UP (ref 80–100)
PLATELET # BLD AUTO: 165 K/UL — SIGNIFICANT CHANGE UP (ref 150–400)
POTASSIUM SERPL-MCNC: 4.5 MMOL/L — SIGNIFICANT CHANGE UP (ref 3.5–5.3)
POTASSIUM SERPL-SCNC: 4.5 MMOL/L — SIGNIFICANT CHANGE UP (ref 3.5–5.3)
PROT SERPL-MCNC: 6.8 GM/DL — SIGNIFICANT CHANGE UP (ref 6–8.3)
RBC # BLD: 4.66 M/UL — SIGNIFICANT CHANGE UP (ref 4.2–5.8)
RBC # FLD: 13.5 % — SIGNIFICANT CHANGE UP (ref 10.3–14.5)
SODIUM SERPL-SCNC: 137 MMOL/L — SIGNIFICANT CHANGE UP (ref 135–145)
WBC # BLD: 7.8 K/UL — SIGNIFICANT CHANGE UP (ref 3.8–10.5)
WBC # FLD AUTO: 7.8 K/UL — SIGNIFICANT CHANGE UP (ref 3.8–10.5)

## 2021-05-29 PROCEDURE — 99233 SBSQ HOSP IP/OBS HIGH 50: CPT

## 2021-05-29 PROCEDURE — 99232 SBSQ HOSP IP/OBS MODERATE 35: CPT

## 2021-05-29 PROCEDURE — 70450 CT HEAD/BRAIN W/O DYE: CPT | Mod: 26

## 2021-05-29 RX ADMIN — CEFTRIAXONE 2000 MILLIGRAM(S): 500 INJECTION, POWDER, FOR SOLUTION INTRAMUSCULAR; INTRAVENOUS at 11:29

## 2021-05-29 RX ADMIN — CLOPIDOGREL BISULFATE 75 MILLIGRAM(S): 75 TABLET, FILM COATED ORAL at 09:13

## 2021-05-29 RX ADMIN — HEPARIN SODIUM 5000 UNIT(S): 5000 INJECTION INTRAVENOUS; SUBCUTANEOUS at 17:24

## 2021-05-29 RX ADMIN — ATORVASTATIN CALCIUM 80 MILLIGRAM(S): 80 TABLET, FILM COATED ORAL at 21:38

## 2021-05-29 RX ADMIN — PANTOPRAZOLE SODIUM 40 MILLIGRAM(S): 20 TABLET, DELAYED RELEASE ORAL at 09:13

## 2021-05-29 RX ADMIN — Medication 81 MILLIGRAM(S): at 09:13

## 2021-05-29 RX ADMIN — HEPARIN SODIUM 5000 UNIT(S): 5000 INJECTION INTRAVENOUS; SUBCUTANEOUS at 06:57

## 2021-05-29 RX ADMIN — CHLORHEXIDINE GLUCONATE 1 APPLICATION(S): 213 SOLUTION TOPICAL at 06:57

## 2021-05-29 RX ADMIN — HEPARIN SODIUM 5000 UNIT(S): 5000 INJECTION INTRAVENOUS; SUBCUTANEOUS at 21:38

## 2021-05-29 RX ADMIN — Medication 1 DROP(S): at 09:13

## 2021-05-29 RX ADMIN — Medication 1 DROP(S): at 21:38

## 2021-05-29 NOTE — CONSULT NOTE ADULT - SUBJECTIVE AND OBJECTIVE BOX
Patient is a 87y old  Male who presents with a chief complaint of CVA (29 May 2021 06:38)    HPI:  S:    Pt seen and examined  HD # 1  FULL CODE  PMHx s/p recent ischemic CVA's s/p d/c within past week (was ambulating with walker, speaking). RA  Pt BIBEMS from home for shaking, weakness, concern for infection  In ER, Pt became aphasic and R sided motor deficit; code stroke called, HCT without acute change, CTA w/o LVO, no tPA given  Pt now hypoxic on NRB  ICU consult for hypoxia    5/24: CT possible cholecystitis, UA with some bacteria On broad spectrum abx, fluid resuscitation ongoing.        PMH: as above  PSH: as above  Meds: per reconciliation sheet, noted below  MEDICATIONS  (STANDING):  artificial  tears Solution 1 Drop(s) Both EYES two times a day  aspirin  chewable 81 milliGRAM(s) Oral daily  atorvastatin 80 milliGRAM(s) Oral at bedtime  cefTRIAXone Injectable. 2000 milliGRAM(s) IV Push every 24 hours  chlorhexidine 4% Liquid 1 Application(s) Topical <User Schedule>  clopidogrel Tablet 75 milliGRAM(s) Oral daily  heparin   Injectable 5000 Unit(s) SubCutaneous every 8 hours      Allergies    penicillin (Unknown)    Intolerances      Social: no smoking, no alcohol, no illegal drugs; no recent travel, no exposure to TB  FAMILY HISTORY:     no history of premature cardiovascular disease in first degree relatives    ROS: unable to obtain d/t medical condition     All other systems reviewed and are negative    Vital Signs Last 24 Hrs  T(C): 37.1 (29 May 2021 06:00), Max: 37.1 (29 May 2021 00:00)  T(F): 98.7 (29 May 2021 06:00), Max: 98.8 (29 May 2021 00:00)  HR: 91 (29 May 2021 11:00) (83 - 100)  BP: 134/65 (29 May 2021 10:00) (113/78 - 136/61)  BP(mean): 83 (29 May 2021 10:00) (63 - 87)  RR: 23 (29 May 2021 11:00) (9 - 24)  SpO2: 88% (29 May 2021 11:00) (88% - 97%)  Daily     Daily     PE:  Constitutional: frail looking, aphasic   HEENT: NC/AT, EOMI, PERRLA, conjunctivae clear; ears and nose atraumatic; pharynx benign  Neck: supple; thyroid not palpable  Back: no tenderness  Respiratory: respiratory effort normal; clear to auscultation  Cardiovascular: S1S2 regular, no murmurs  Abdomen: soft, not tender, not distended, positive BS; liver and spleen WNL  Genitourinary: no suprapubic tenderness  Lymphatic: no LN palpable  Musculoskeletal: no muscle tenderness, no joint swelling or tenderness  Extremities: no pedal edema  Neurological/ Psychiatric:  aphasic, focal deficits  Skin: no rashes; no palpable lesions    Labs: all available labs reviewed                        13.9   7.80  )-----------( 165      ( 29 May 2021 06:26 )             42.1     05-29    137  |  106  |  31<H>  ----------------------------<  138<H>  4.5   |  27  |  0.82    Ca    9.5      29 May 2021 06:26    TPro  6.8  /  Alb  2.8<L>  /  TBili  0.5  /  DBili  x   /  AST  50<H>  /  ALT  100<H>  /  AlkPhos  185<H>  05-29     LIVER FUNCTIONS - ( 29 May 2021 06:26 )  Alb: 2.8 g/dL / Pro: 6.8 gm/dL / ALK PHOS: 185 U/L / ALT: 100 U/L / AST: 50 U/L / GGT: x           Culture - Blood (05.27.21 @ 09:59)   Specimen Source: .Blood None   Culture Results:   No growth to date.     Culture - Urine (05.24.21 @ 15:04)   Specimen Source: .Urine None   Culture Results:   No growth     Culture - Blood (05.24.21 @ 12:12)   - Escherichia coli: Detec   Gram Stain:   Growth in aerobic bottle:   Gram Negative Rods   Growth in anaerobic bottle:   Gram Negative Rods   - Amikacin: S <=16   - Ampicillin: S <=8 These ampicillin results predict results for amoxicillin   - Ampicillin/Sulbactam: S <=4/2 Enterobacter, Citrobacter, and Serratia may develop resistance during prolonged therapy (3-4 days)   - Aztreonam: S <=4   - Cefazolin: S <=2 Enterobacter, Citrobacter, and Serratia may develop resistance during prolonged therapy (3-4 days)   - Cefepime: S <=2   - Cefoxitin: S <=8   - Ceftriaxone: S <=1 Enterobacter, Citrobacter, and Serratia may develop resistance during prolonged therapy   - Ciprofloxacin: R >2   - Ertapenem: S <=0.5   - Gentamicin: S <=2   - Imipenem: S <=1   - Levofloxacin: R >4   - Meropenem: S <=1   - Piperacillin/Tazobactam: S <=8   - Tobramycin: S <=2   - Trimethoprim/Sulfamethoxazole: S <=0.5/9.5   Specimen Source: .Blood None   Organism: Blood Culture PCR   Organism: Escherichia coli   Culture Results:   Growth in aerobic and anaerobic bottles: Escherichia coli       Radiology: all available radiological tests reviewed  < from: MR Head No Cont (05.26.21 @ 14:23) >  EXAM:  MR BRAIN                            PROCEDURE DATE:  05/26/2021          INTERPRETATION:  CLINICAL INFORMATION: recurrent stroke    recurrent right sided weaknes. recurrent stroke    TECHNIQUE: Multiplanar, multisequence brain MRI was performed without intravenous contrast    COMPARISON: CT head 5/25/2021.  MRI brain 5/14/2021..    FINDINGS:    Numerous new areas of restricted diffusion in the left frontal, parietal, occipital, and temporal lobes, left basal ganglia, and right parietal lobe, suggesting acute infarcts. The left-sided infarcts have a watershed distribution. Areas of subacute infarct in the left parietal, occipital and temporal lobes are again seen. There are areas of susceptibility artifact associated with acute and subacute infarcts in the left parietal-occipital region, suggesting hemorrhagic transformation.    Chronic lacunar infarcts in the left basal ganglia, left cerebellum and right centrum semiovale. Chronic right parietal infarct. Scattered foci of T2/FLAIR hyperintensity in the bilateral hemispheric white matter are nonspecific but likely related to chronic white matter microvascular ischemic disease. There is cerebral volume loss.    Flow voids of the major intracranial vessels at the skull base follow expected course and contour.    Mild bilateral ethmoid sinus mucosal thickening. The mastoids demonstrate no signal abnormality.  Status post right intraocular lens implant.      IMPRESSION:    Numerous new areas of restricted diffusion in the leftfrontal, parietal, occipital, and temporal lobes, left basal ganglia, and right parietal lobe, suggesting acute infarcts. The left-sided infarcts have a watershed distribution. Areas of subacute infarct in the left parietal, occipital and temporal lobes are again seen. There are areas of susceptibility artifact associated with acute and subacute infarcts in the left parietal-occipital region, suggesting hemorrhagic transformation.    These findings were discussed with WIL Ferguson at 5/26/2021 3:05 PMby Dr. Franko Blackmon with read back confirmation.    < end of copied text >  < from: Xray Kidney Ureter Bladder (05.25.21 @ 12:43) >    EXAM:  XR KUB 1 VIEW                            PROCEDURE DATE:  05/25/2021          INTERPRETATION:  Chest one view    HISTORY: Feeding tube placement    COMPARISON STUDY: Earlier the same day    Frontal expiratory view of the chest shows the heart to be similar in size. Feeding tube reaches the stomach. Loop recorder is again noted.    The lungs show no focal infiltrate and there is no evidence of pneumothorax nor pleural effusion.    IMPRESSION:  Feeding tube to stomach.    Thank you for thecourtesy of this referral.        < end of copied text >  < from: CT Abdomen and Pelvis w/ IV Cont (05.24.21 @ 12:46) >    EXAM:  CT ABDOMEN AND PELVIS IC                            PROCEDURE DATE:  05/24/2021          INTERPRETATION:  CLINICAL INFORMATION: Diffuse abdominal pain.    COMPARISON: CT abdomen/pelvis 5/23/2020    CONTRAST/COMPLICATIONS:  IV Contrast: Administered at the time of CTA head and neck of the same day.  Oral Contrast: NONE  Complications: None reported at time of study completion    PROCEDURE:  CT of the Abdomen and Pelvis was performed.  Sagittal and coronal reformats were performed.    FINDINGS:  LOWER CHEST: 1.3 cm cyst in the right lower lobe. Linear atelectasis or scarring at the lung bases. Small hiatal hernia. Coronary artery calcifications.    LIVER: Within normal limits.  BILE DUCTS: Normal caliber. Mild enhancement of the commonbile duct  GALLBLADDER: Distended. Mild wall enhancement. No wall thickening or calcified gallstones.  SPLEEN: Within normal limits.  PANCREAS: Fatty infiltration of the pancreatic head.  ADRENALS: Unremarkable.  KIDNEYS/URETERS: Excreted intravenouscontrast in both renal collecting systems. No hydronephrosis. 2.2 cm left renal cyst.    BLADDER: Mild wall thickening. Small bladder diverticulum arising from the right bladder wall.  REPRODUCTIVE ORGANS: Median lobe hypertrophy of the prostate withintravesicular protrusion.    BOWEL: Colonic diverticulosis without evidence of diverticulitis. No bowel obstruction. Nonspecific mild specific haziness of the fat in the small bowel mesentery with mildly prominent mesenteric lymph nodes, unchanged since 5/23/2020.  PERITONEUM: No ascites. Small ovoid partially calcified structure at the anterior aspect of the pelvis (series 2 image 101), possibly a lymph node. Clips again seen in the right lower quadrant.  VESSELS: Ectasia of the intrarenal abdominal aorta measuring 2.6 x 2.5 cm, unchanged. Atherosclerotic changes.  RETROPERITONEUM/LYMPH NODES: No lymphadenopathy.  ABDOMINAL WALL: There appears to be a hernia plug in the right inguinal region.  BONES: Chronic compression deformity of the superior endplate of T11. Stable sclerotic lesion in L5.    IMPRESSION:  Mildly distended gallbladder with mild gallbladder wall and common bile duct enhancement.    No gallbladder wall thickening or calcified gallstones; correlation is recommended with LFTs and a right upper quadrant ultrasound could be obtained for further evaluation.        < end of copied text >    Advanced directives addressed: full resuscitation Patient is a 87y old  Male who presents with a chief complaint of CVA (29 May 2021 06:38)    HPI:  87 y.o. 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 US abd remarkable for GB sludge, UA/urine cx unremarkable, was given IV cefepime 5/24-5/27, now on IV rocephin since 5/28, ? etiology - gb sludge/gut translocation? had previous FABIO 5/15 no vegetation or thrombus and repeat blood cx 5/27, 5/28 no growth so far.       PMH: as above  PSH: as above  Meds: per reconciliation sheet, noted below  MEDICATIONS  (STANDING):  artificial  tears Solution 1 Drop(s) Both EYES two times a day  aspirin  chewable 81 milliGRAM(s) Oral daily  atorvastatin 80 milliGRAM(s) Oral at bedtime  cefTRIAXone Injectable. 2000 milliGRAM(s) IV Push every 24 hours  chlorhexidine 4% Liquid 1 Application(s) Topical <User Schedule>  clopidogrel Tablet 75 milliGRAM(s) Oral daily  heparin   Injectable 5000 Unit(s) SubCutaneous every 8 hours      Allergies    penicillin (Unknown)    Intolerances      Social: no smoking, no alcohol, no illegal drugs; no recent travel, no exposure to TB  FAMILY HISTORY:     no history of premature cardiovascular disease in first degree relatives    ROS: unable to obtain d/t medical condition     All other systems reviewed and are negative    Vital Signs Last 24 Hrs  T(C): 37.1 (29 May 2021 06:00), Max: 37.1 (29 May 2021 00:00)  T(F): 98.7 (29 May 2021 06:00), Max: 98.8 (29 May 2021 00:00)  HR: 91 (29 May 2021 11:00) (83 - 100)  BP: 134/65 (29 May 2021 10:00) (113/78 - 136/61)  BP(mean): 83 (29 May 2021 10:00) (63 - 87)  RR: 23 (29 May 2021 11:00) (9 - 24)  SpO2: 88% (29 May 2021 11:00) (88% - 97%)  Daily     Daily     PE:  Constitutional: frail looking, aphasic   HEENT: NC/AT, EOMI, PERRLA, conjunctivae clear; ears and nose atraumatic; pharynx benign  Neck: supple; thyroid not palpable  Back: no tenderness  Respiratory: respiratory effort normal; clear to auscultation  Cardiovascular: S1S2 regular, no murmurs  Abdomen: soft, not tender, not distended, positive BS; liver and spleen WNL  Genitourinary: no suprapubic tenderness  Lymphatic: no LN palpable  Musculoskeletal: no muscle tenderness, no joint swelling or tenderness  Extremities: no pedal edema  Neurological/ Psychiatric:  aphasic, R hemiparesis  Skin: no rashes; no palpable lesions    Labs: all available labs reviewed                        13.9   7.80  )-----------( 165      ( 29 May 2021 06:26 )             42.1     05-29    137  |  106  |  31<H>  ----------------------------<  138<H>  4.5   |  27  |  0.82    Ca    9.5      29 May 2021 06:26    TPro  6.8  /  Alb  2.8<L>  /  TBili  0.5  /  DBili  x   /  AST  50<H>  /  ALT  100<H>  /  AlkPhos  185<H>  05-29     LIVER FUNCTIONS - ( 29 May 2021 06:26 )  Alb: 2.8 g/dL / Pro: 6.8 gm/dL / ALK PHOS: 185 U/L / ALT: 100 U/L / AST: 50 U/L / GGT: x           Culture - Blood (05.27.21 @ 09:59)   Specimen Source: .Blood None   Culture Results:   No growth to date.     Culture - Urine (05.24.21 @ 15:04)   Specimen Source: .Urine None   Culture Results:   No growth     Culture - Blood (05.24.21 @ 12:12)   - Escherichia coli: Detec   Gram Stain:   Growth in aerobic bottle:   Gram Negative Rods   Growth in anaerobic bottle:   Gram Negative Rods   - Amikacin: S <=16   - Ampicillin: S <=8 These ampicillin results predict results for amoxicillin   - Ampicillin/Sulbactam: S <=4/2 Enterobacter, Citrobacter, and Serratia may develop resistance during prolonged therapy (3-4 days)   - Aztreonam: S <=4   - Cefazolin: S <=2 Enterobacter, Citrobacter, and Serratia may develop resistance during prolonged therapy (3-4 days)   - Cefepime: S <=2   - Cefoxitin: S <=8   - Ceftriaxone: S <=1 Enterobacter, Citrobacter, and Serratia may develop resistance during prolonged therapy   - Ciprofloxacin: R >2   - Ertapenem: S <=0.5   - Gentamicin: S <=2   - Imipenem: S <=1   - Levofloxacin: R >4   - Meropenem: S <=1   - Piperacillin/Tazobactam: S <=8   - Tobramycin: S <=2   - Trimethoprim/Sulfamethoxazole: S <=0.5/9.5   Specimen Source: .Blood None   Organism: Blood Culture PCR   Organism: Escherichia coli   Culture Results:   Growth in aerobic and anaerobic bottles: Escherichia coli       Radiology: all available radiological tests reviewed    < from: CT Head No Cont (05.29.21 @ 14:23) >  EXAM:  CT BRAIN                            PROCEDURE DATE:  05/29/2021          INTERPRETATION:  CT head without IV contrast    CLINICAL INFORMATION:  Follow-up   Intracranial hemorrhage.    TECHNIQUE: Contiguous axial 5 mm sections were obtained through the head. Sagittal and coronal 2-D reformatted images were also obtained.   This scan was performed using automatic exposure control (radiation dose reduction software) to obtain a diagnostic image quality scan with patient dose as low as reasonably achievable.    FINDINGS:   MRI dated 05/26/2021 available for review.    The brain demonstrates small acute cortical infarctions again seen in in the LEFT frontal and parietal lobes. Acute/subacute white matter infarctions also seen in the LEFT centrum semiovale. Mild periventricular and deep white matter ischemia is noted with old lacunar infarctions in the BILATERAL basal ganglia. No intracranial hemorrhage is found.  No mass effect is found in the brain.    The ventricles, sulci and basalcisterns appear unremarkable.    The orbits are unremarkable.  The paranasal sinuses are clear.  The nasal cavity appears intact.  The nasopharynx is symmetric.  The central skull base, petrous temporal bones and calvarium remain intact.      IMPRESSION:   Small acute cortical infarctions again seen in in the LEFT frontal and parietal lobes. Acute/subacute white matter infarctions also seen in the LEFT centrum semiovale. Mild periventricular and deep white matter ischemia is noted with old lacunar infarctions in the BILATERAL basal ganglia.    < end of copied text >  < from: US Abdomen Complete (US Abdomen Complete .) (05.25.21 @ 11:20) >    EXAM:  US ABDOMEN COMPLETE                            PROCEDURE DATE:  05/25/2021          INTERPRETATION:  CLINICAL INFORMATION: Evaluate right upper quadrant and gallbladder.    COMPARISON: CT abdomen May 24, 2020    TECHNIQUE: Sonography of the abdomen.    FINDINGS:    Liver: Mildly increased hepatic echogenicity. Smooth contours. No focal lesion.  Bile ducts: Normal caliber. Common bile duct measures 4 mm.  Gallbladder: Rounded echogenic nodule measuring 3.2 x 2.7 cm without shadowing or flow. No wall thickening or pericholecystic fluid  Pancreas: Visualized portions are within normal limits.  Spleen: 11.91. Within normal limits.  Right kidney: 14.1 cm. No hydronephrosis.  Left kidney: 11.6 cm.  No hydronephrosis. 3.7 cm left renal cyst.  Ascites: None.  Aorta and IVC: Visualized portions are within normal limits.    IMPRESSION:  Echogenic mass in the gallbladder compatible with tumefactive sludge    No evidence of acute cholecystitis.        < end of copied text >  < from: CT Abdomen and Pelvis w/ IV Cont (05.24.21 @ 12:46) >  EXAM:  CT ABDOMEN AND PELVIS IC                            PROCEDURE DATE:  05/24/2021          INTERPRETATION:  CLINICAL INFORMATION: Diffuse abdominal pain.    COMPARISON: CT abdomen/pelvis 5/23/2020    CONTRAST/COMPLICATIONS:  IV Contrast: Administered at the time of CTA head and neck of the same day.  Oral Contrast: NONE  Complications: None reported at time of study completion    PROCEDURE:  CT of the Abdomen and Pelvis was performed.  Sagittal and coronal reformats were performed.    FINDINGS:  LOWER CHEST: 1.3 cm cyst in the right lower lobe. Linear atelectasis or scarring at the lung bases. Small hiatal hernia. Coronary artery calcifications.    LIVER: Within normal limits.  BILE DUCTS: Normal caliber. Mild enhancement of the commonbile duct  GALLBLADDER: Distended. Mild wall enhancement. No wall thickening or calcified gallstones.  SPLEEN: Within normal limits.  PANCREAS: Fatty infiltration of the pancreatic head.  ADRENALS: Unremarkable.  KIDNEYS/URETERS: Excreted intravenouscontrast in both renal collecting systems. No hydronephrosis. 2.2 cm left renal cyst.    BLADDER: Mild wall thickening. Small bladder diverticulum arising from the right bladder wall.  REPRODUCTIVE ORGANS: Median lobe hypertrophy of the prostate withintravesicular protrusion.    BOWEL: Colonic diverticulosis without evidence of diverticulitis. No bowel obstruction. Nonspecific mild specific haziness of the fat in the small bowel mesentery with mildly prominent mesenteric lymph nodes, unchanged since 5/23/2020.  PERITONEUM: No ascites. Small ovoid partially calcified structure at the anterior aspect of the pelvis (series 2 image 101), possibly a lymph node. Clips again seen in the right lower quadrant.  VESSELS: Ectasia of the intrarenal abdominal aorta measuring 2.6 x 2.5 cm, unchanged. Atherosclerotic changes.  RETROPERITONEUM/LYMPH NODES: No lymphadenopathy.  ABDOMINAL WALL: There appears to be a hernia plug in the right inguinal region.  BONES: Chronic compression deformity of the superior endplate of T11. Stable sclerotic lesion in L5.    IMPRESSION:  Mildly distended gallbladder with mild gallbladder wall and common bile duct enhancement.    No gallbladder wall thickening or calcified gallstones; correlation is recommended with LFTs and a right upper quadrant ultrasound could be obtained for further evaluation.        EXAM:  MR BRAIN                            PROCEDURE DATE:  05/26/2021          INTERPRETATION:  CLINICAL INFORMATION: recurrent stroke    recurrent right sided weaknes. recurrent stroke    TECHNIQUE: Multiplanar, multisequence brain MRI was performed without intravenous contrast    COMPARISON: CT head 5/25/2021.  MRI brain 5/14/2021..    FINDINGS:    Numerous new areas of restricted diffusion in the left frontal, parietal, occipital, and temporal lobes, left basal ganglia, and right parietal lobe, suggesting acute infarcts. The left-sided infarcts have a watershed distribution. Areas of subacute infarct in the left parietal, occipital and temporal lobes are again seen. There are areas of susceptibility artifact associated with acute and subacute infarcts in the left parietal-occipital region, suggesting hemorrhagic transformation.    Chronic lacunar infarcts in the left basal ganglia, left cerebellum and right centrum semiovale. Chronic right parietal infarct. Scattered foci of T2/FLAIR hyperintensity in the bilateral hemispheric white matter are nonspecific but likely related to chronic white matter microvascular ischemic disease. There is cerebral volume loss.    Flow voids of the major intracranial vessels at the skull base follow expected course and contour.    Mild bilateral ethmoid sinus mucosal thickening. The mastoids demonstrate no signal abnormality.  Status post right intraocular lens implant.      IMPRESSION:    Numerous new areas of restricted diffusion in the leftfrontal, parietal, occipital, and temporal lobes, left basal ganglia, and right parietal lobe, suggesting acute infarcts. The left-sided infarcts have a watershed distribution. Areas of subacute infarct in the left parietal, occipital and temporal lobes are again seen. There are areas of susceptibility artifact associated with acute and subacute infarcts in the left parietal-occipital region, suggesting hemorrhagic transformation.    These findings were discussed with WIL Ferguson at 5/26/2021 3:05 PMby Dr. Franko Blackmon with read back confirmation.        Advanced directives addressed: full resuscitation

## 2021-05-29 NOTE — PROGRESS NOTE ADULT - ASSESSMENT
Recurrent CVA-   Loop recorder interrogation did not reveal any afib.  No evidence of afib on tele. FABIO few weeks ago did not reveal any cardioembolic source of thrombus  on Aspirin   MRI results as stated above.  Management per primary team and neurology team.    Hyperlipidemia- continue statin.    HTN- BP optimal.    Sepsis- Management per primary team.   E coli sepsis.  Recommend ID team consultation.      Other medical issues- Management per primary team.   Thank you for allowing me to participate in the care of this patient. Please feel free to contact me with any questions.

## 2021-05-29 NOTE — PROGRESS NOTE ADULT - ASSESSMENT
87 y.o. 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    1. Recurrent embolic CVA with unclear etiology - needs hypercoagulable w/u and in the settings of bacteremia would repeat FABIO to r/o septic emboli   - no AF on ILR interrogation   - cont ASA/plavix/statin  keep corepack   - unclear source of bacteremia - cont IV abx, ID eval, possible FABIO, repeat BCx, neg EEGs    2. GB sludge w/o evidence of cholecystitis    3. Hx of RA off humira     4. VTE proph - UFH    Pall consult    Discussed with family, Neurology, SS, CICU team   87 y.o. 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    1. Recurrent embolic CVA with unclear etiology - needs hypercoagulable w/u and in the settings of bacteremia would repeat FABIO to r/o septic emboli   - no AF on ILR interrogation   - cont ASA/plavix/statin  keep corepack   - unclear source of bacteremia - cont IV abx, ID eval, possible FABIO, repeat BCx, neg EEGs  sludge in GB    2. GB sludge w/o evidence of cholecystitis    3. Hx of RA off humira     4. VTE proph - UFH    Pall consult    Discussed with family, Neurology, SS, CICU team   87 y.o. 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    1. Recurrent embolic CVA with unclear etiology - needs hypercoagulable w/u and in the settings of bacteremia would repeat FABIO to r/o septic emboli   - no AF on ILR interrogation   - cont ASA/plavix/statin  keep corepack   - unclear source of bacteremia - cont IV abx, ID eval, possible FABIO, repeat BCx, neg EEGs  sludge in GB  case d/w neuro; repeat CT brain to see if stable    2. GB sludge w/o evidence of cholecystitis    3. Hx of RA off humira     4. VTE proph - UFH    Pall consult    Discussed with family, Neurology, SS, CICU team

## 2021-05-29 NOTE — PROGRESS NOTE ADULT - SUBJECTIVE AND OBJECTIVE BOX
87 y.o. 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    INTERVAL HPI: ROS UTO due to aphasia    Vital Signs Last 24 Hrs  T(C): 37.1 (29 May 2021 06:00), Max: 37.1 (29 May 2021 00:00)  T(F): 98.7 (29 May 2021 06:00), Max: 98.8 (29 May 2021 00:00)  HR: 83 (29 May 2021 08:00) (83 - 100)  BP: 116/44 (29 May 2021 08:00) (113/78 - 136/61)  BP(mean): 63 (29 May 2021 08:00) (63 - 87)  RR: 20 (29 May 2021 08:00) (9 - 24)  SpO2: 96% (29 May 2021 08:00) (92% - 97%)            PHYSICAL EXAM:    General: male in no acute distress  Eyes: conjunctiva and sclera clear  Head: Normocephalic; atraumatic, BL temporal wasting  ENMT: corpak in place  Neck: Supple; non tender; no masses  Respiratory: No wheezes, rales or rhonchi  Cardiovascular: S1, S2 reg  Gastrointestinal: Soft non-tender non-distended; Normal bowel sounds  Genitourinary: No costovertebral angle tenderness  Extremities BL rheumatoid hands deformities, no cyanosis or edema  Vascular: Peripheral pulses palpable 2+ bilaterally  Neurological: Alert, aphasic awake not following commands  Skin: Warm and dry.                               13.9   7.80  )-----------( 165      ( 29 May 2021 06:26 )             42.1   05-29    137  |  106  |  31<H>  ----------------------------<  138<H>  4.5   |  27  |  0.82    Ca    9.5      29 May 2021 06:26    TPro  6.8  /  Alb  2.8<L>  /  TBili  0.5  /  DBili  x   /  AST  50<H>  /  ALT  100<H>  /  AlkPhos  185<H>  05-29

## 2021-05-29 NOTE — CONSULT NOTE ADULT - ASSESSMENT
86 yo male with hx of recent stroke, and in ED with acute stroke and was not a TPA candidate given his recent stroke.      Consulted for concerns for acute cholecystitis w/ elevated lfts and patient with fevers/sepsis/bacteremia.     On PE, abdomen did not feel severely tender however ct scan did note no gall stones, but slightly enhanced gb, now abnormal lfts.      Agree with abdominal sonogram, if positive will likely need c-tube in IR as patient will likely not be a surgery candidate?     Discussed with ICU team, Dr. Thrasher.   
87 year old male with abdominal pain and vomiting, recently discharged with CVA 5/14. In ER acutely developed RUE weakness and aphasia. Neutropenic with elevated lactic acid    seen with Dr. Her  no TPA at this time due to recent stroke x2 in the last 2 months and high risk of hemorrhagic conversion   discussed with wife at family.   patient became more acutely hypoxic in ER appears septic  discussed with ER.   No acute clot retrieval due to patent intracranial vasculature.   defer further care to medical team.   Recommend MRI brain when stable. 
Recurrent CVA- discussed with neurology team and Dr Martínez ICU team.  Loop recorder interrogation did not reveal any afib.  No evidence of afib on tele.  on Aspirin   Management per primary team and neurology team.    Hyperlipidemia- continue statin.    HTN- BP optimal.    Other medical issues- Management per primary team.   Thank you for allowing me to participate in the care of this patient. Please feel free to contact me with any questions. 
87 y.o. 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 US abd remarkable for GB sludge, UA/urine cx unremarkable, was given IV cefepime 5/24-5/27, now on IV rocephin since 5/28, ? etiology - gb sludge/gut translocation? had previous FABIO 5/15 no vegetation or thrombus and repeat blood cx 5/27, 5/28 no growth so far.     1. sepsis with Ecoli. GB sludge. Acute L embolic CVA  - imaging reviewed, neurology follow up noted, etiology of cva unclear  - etiology of ecoli ? gut/gb, urine unremarkable  - previous FABIO 5/15 no vegetation or thrombus  - suggest repeat FABIO   - repeat blood cx 5/27, 5/28 no growth  - s/p cefepime #3 5/24-5/27  - on IV rocephin 2gm daily #3   - continue with antibiotic coverage  - fu cbc  - tolerating abx well so far; no side effects noted  - reason for abx use and side effects reviewed with patient  - supportive care    2. other issues - care per medicine

## 2021-05-29 NOTE — PROGRESS NOTE ADULT - SUBJECTIVE AND OBJECTIVE BOX
Interval History:  5/29/21: No significant change.    MEDICATIONS  (STANDING):  artificial  tears Solution 1 Drop(s) Both EYES two times a day  aspirin  chewable 81 milliGRAM(s) Oral daily  atorvastatin 80 milliGRAM(s) Oral at bedtime  cefTRIAXone Injectable. 2000 milliGRAM(s) IV Push every 24 hours  chlorhexidine 4% Liquid 1 Application(s) Topical <User Schedule>  clopidogrel Tablet 75 milliGRAM(s) Oral daily  heparin   Injectable 5000 Unit(s) SubCutaneous every 8 hours  pantoprazole  Injectable 40 milliGRAM(s) IV Push daily    MEDICATIONS  (PRN):  acetaminophen   Tablet .. 650 milliGRAM(s) Oral every 8 hours PRN Mild Pain (1 - 3)      Allergies    penicillin (Unknown)    Intolerances        PHYSICAL EXAM:  Vital Signs Last 24 Hrs  T(F): 98.7 (05-29-21 @ 06:00)  HR: 83 (05-29-21 @ 08:00)  BP: 116/44 (05-29-21 @ 08:00)  RR: 20 (05-29-21 @ 08:00)    GENERAL: NAD, well-groomed, well-developed  HEAD:  Atraumatic, Normocephalic  Awake, alert.   Follows simple commands with visual cues  + Expressive aphasia   CN: PERRL, EOMI, Right facial droop  Motor: moving left upper extremity well. Moves left leg. + right sided weakness  Sensory: Decreased withdrawal on right  Reflexes +2 Rt brisk +2 left UE Plantars Up going Rt down going Left  Gait: Unable to test    LABS:                        13.9   7.80  )-----------( 165      ( 29 May 2021 06:26 )             42.1     05-29    137  |  106  |  31<H>  ----------------------------<  138<H>  4.5   |  27  |  0.82    Ca    9.5      29 May 2021 06:26    TPro  6.8  /  Alb  2.8<L>  /  TBili  0.5  /  DBili  x   /  AST  50<H>  /  ALT  100<H>  /  AlkPhos  185<H>  05-29          RADIOLOGY & ADDITIONAL STUDIES:  CT head 5/24/21:  1)  chronic ischemic changes in both hemispheres with volume loss and involutional change. No acute infarct, hemorrhage, or space occupying lesion suggested.  2)  follow-up MR imaging may be considered for further assessment.    CTA head/CTA neck/CT perfusion 5/24/21:  1. The CT perfusion study suggests generalized hypoperfusion of both hemispheres as well as within the posterior fossa without a focal abnormality. No area of core infarction. See above.  2. Moderate calcified plaque right carotid bifurcation but without significant stenosis. Focal stenosis left internal carotid 1 cm above the bifurcation with no tandemlesion noted.  3. Patent vertebrals.  4. Widely patent vasculature noted intracranially. Mild scattered atherosclerotic changes. No occlusion or tight stenosis suggested.    CT head 5/25/21:  Area of hypodensity in the left parietal occipital region suggesting acute/subacute infarct. No evidence of hemorrhagic transformation.    MR head 5/26/21:  Numerous new areas of restricted diffusion in the left frontal, parietal, occipital, and temporal lobes, left basal ganglia, and right parietal lobe, suggesting acute infarcts. The left-sided infarcts have a watershed distribution. Areas of subacute infarct in the left parietal, occipital and temporal lobes are again seen. There are areas of susceptibility artifact associated with acute and subacute infarcts in the left parietal-occipital region, suggesting hemorrhagic transformation.    eeg 5/25/21:  1.This is an abnormal EEG due to the presence of mild generalized background slowing may be on the basis of metabolic causes.  2. Marked left frontal and temporal slowing indicative of underlying structural pathology. Clinical correlation recommended.  3. No epileptiform activity noted. Clinical correlation recommended.    24 Hour eeg 5/26/21-5/27/21:  This is an abnormal prolonged video EEG due to the presence of   Mild generalized slowing with superimposed moderate to severe slowing on the left side indicative of underlying structural pathology. Clinical correlation recommended.  No epileptiform activity noted. Dr. Martínez informed of the results.

## 2021-05-29 NOTE — PROGRESS NOTE ADULT - ASSESSMENT
85 YO M with a PMH of RA, Diverticulitis, CVA in 3/2021 and CVA 5/14. In ER acutely developed RUE weakness and aphasia. Neutropenic with elevated lactic acid .  Also found ot have bacteremia.    # Acute Left CVA with Rt Hemiparesis   -Patient was not a  TPA candidate due to Recent CVA and Pt's medical condition with Hypoxia and Sepsis-NO Intervention as no LVO  -Follow up MRI showed numerous areas of restricted diffusion. Some may be related to watershed infarcts, but there is still a ? of embolic phenomenon. ILR interrogation was negative for a-fib. Possible repeat FABIO (FABIO several weeks ago negative for thrombus).  -Continue Aspirin +  Plavix at this time.  -Continue Statin  -May need f/u CT - ? of hemorrhagic transformation on MRI brain  -Speech and swallow follow-u appreciated  -PT  -Cardiology follow up appreciated      # Sepsis   -Follow up with ID/ Antibiotics      Will follow

## 2021-05-29 NOTE — PROGRESS NOTE ADULT - SUBJECTIVE AND OBJECTIVE BOX
Patient is a 87y old  Male who presents with a chief complaint of nausea, vomiting, acute stroke.       HPI:  87 yom with recent CVA presented with bacteremia and was noted to have recurrent CVA in the ER.  5/25-   Pt seen this am . Pt is unable to answer questions.  He was unable to sit up in bed also.  Spoke to Dr Martínez.   5/27- pt seen this am.  Pt sleeping soundly.  on NG tube.    5/28- pt seen this am.  Pt denies any CP or SOB.  He is much more alert and he was unable to speak.  NG tube in place.    5/29- pt seen this am. no overnight issues per staff.  on TF.     PAST MEDICAL & SURGICAL HISTORY:  Rheumatoid arthritis    Diverticulitis    History of hernia repair    History of appendectomy        MEDICATIONS  (STANDING):  aspirin Suppository 300 milliGRAM(s) Rectal daily  atorvastatin 80 milliGRAM(s) Oral at bedtime  cefepime   IVPB 2000 milliGRAM(s) IV Intermittent every 24 hours  chlorhexidine 4% Liquid 1 Application(s) Topical <User Schedule>  heparin   Injectable 5000 Unit(s) SubCutaneous every 8 hours  lactated ringers. 1000 milliLiter(s) (75 mL/Hr) IV Continuous <Continuous>  pantoprazole  Injectable 40 milliGRAM(s) IV Push daily    MEDICATIONS  (PRN):      FAMILY HISTORY: unable to obtain from pt.       SOCIAL HISTORY: no recent smoking      REVIEW OF SYSTEMS:  CONSTITUTIONAL:    No fatigue, malaise, lethargy.  c/o fever or chills.  RESPIRATORY:  No cough.  No wheeze.  No hemoptysis.  No shortness of breath.  CARDIOVASCULAR:  No chest pains.  No palpitations. No shortness of breath, No orthopnea or PND.  GASTROINTESTINAL:  No abdominal pain.  No nausea or vomiting.    GENITOURINARY:    No hematuria.    MUSCULOSKELETAL:  No musculoskeletal pain.  No joint swelling.  No arthritis.  NEUROLOGICAL:  per HPI  PSYCHIATRIC:  No confusion  SKIN:  No rashes.            ICU Vital Signs Last 24 Hrs  T(C): 37.1 (29 May 2021 00:00), Max: 37.1 (29 May 2021 00:00)  T(F): 98.8 (29 May 2021 00:00), Max: 98.8 (29 May 2021 00:00)  HR: 86 (29 May 2021 04:00) (86 - 100)  BP: 123/53 (29 May 2021 04:00) (109/73 - 136/61)  BP(mean): 70 (29 May 2021 04:00) (68 - 87)  ABP: --  ABP(mean): --  RR: 22 (29 May 2021 04:00) (9 - 24)  SpO2: 94% (29 May 2021 04:00) (92% - 95%)      PHYSICAL EXAM-    Constitutional:  no acute distress     Head: Head is normocephalic and atraumatic.      Neck: No JVD.     Cardiovascular: Regular rate and rhythm without S3, S4. No murmurs or rubs are appreciated.      Respiratory: Breath sounds are normal. No rales. No wheezing.    Abdomen: Soft, nontender, nondistended with positive bowel sounds.      Extremity: No tenderness. No  pitting edema     Neurologic: The patient is alert     Skin: No rash, no obvious lesions noted.      Psychiatric: The patient appears to be emotionally stable.      INTERPRETATION OF TELEMETRY: sinus rythm    ECG: Sinus rythm , RBBB    I&O's Detail    24 May 2021 07:01  -  25 May 2021 07:00  --------------------------------------------------------  IN:    IV PiggyBack: 400 mL    Lactated Ringers: 144 mL    Lactated Ringers Bolus: 1000 mL  Total IN: 1544 mL    OUT:    Indwelling Catheter - Urethral (mL): 416 mL  Total OUT: 416 mL    Total NET: 1128 mL          LABS:                        13.5   7.66  )-----------( 149      ( 28 May 2021 05:36 )             40.1     05-28    138  |  107  |  26<H>  ----------------------------<  147<H>  4.5   |  26  |  0.87    Ca    9.3      28 May 2021 05:36    TPro  6.5  /  Alb  2.5<L>  /  TBili  0.4  /  DBili  x   /  AST  67<H>  /  ALT  118<H>  /  AlkPhos  197<H>  05-28        LIVER FUNCTIONS - ( 28 May 2021 05:36 )  Alb: 2.5 g/dL / Pro: 6.5 gm/dL / ALK PHOS: 197 U/L / ALT: 118 U/L / AST: 67 U/L / GGT: x             05-25 Chol 62 LDL -- HDL 41 Trig 60, 05-14 Chol 119 LDL -- HDL 47 Trig 96      I&O's Summary    24 May 2021 07:01  -  25 May 2021 07:00  --------------------------------------------------------  IN: 1544 mL / OUT: 416 mL / NET: 1128 mL      BNP  RADIOLOGY & ADDITIONAL STUDIES:  ch< from: Xray Chest 1 View- PORTABLE-Urgent (05.24.21 @ 11:41) >  EXAM:  XR CHEST PORTABLE URGENT 1V                            PROCEDURE DATE:  05/24/2021          INTERPRETATION:  AP erect chest on May 24, 2021 at 11:38 AM. Patient has chest pain.    Heart is magnified by technique.    Presently there is a leftloop recorder new since March 7.    Lungs remain clear.    Bilateral shoulder degeneration noted.    IMPRESSION: Left loop recorder presently seen. No acute finding or change.            JEM PARHAM MD; Attending Radiologist  This document has been electronically signed. May 24 2021  3:31PM    < end of copied text >  < from: TTE Echo Complete w/o Contrast w/ Doppler (03.08.21 @ 08:32) >   Impression     Summary     Mild to Moderate mitral regurgitation is present.   Fibrocalcific changes noted to the mitral valve leaflets with preserved   leaflet excursion.   Mild mitral annular calcification is present.   Mild to Moderate mitral regurgitation is present.   EA reversal of the mitral inflow consistent with reduced compliance of the   left ventricle   There are fibrocalcific changes noted to the aortic valve leaflets with   restriction in leaflet excursion. Transaortic gradients are slightly   elevated but do not meet the criteria for aortic stenosis. No aortic   insufficiency noted.   Trace to mild tricuspid valve regurgitation is present.   Normal appearing pulmonic valve structure and function.   Normal appearing left atrium.   The left ventricle is normal in size, wall thickness, wall motion and   contractility.   Estimated left ventricular ejection fraction is 55-60 %.     Signature     ----------------------------------------------------------------   Electronically signed by Laney Clark MD(Interpreting   physician) on 03/08/2021 05:31 PM   ----------------------------------------------------------------    < end of copied text >  < from: TTE Echo Complete w/o Contrast w/ Doppler (03.08.21 @ 08:32) >   Impression     Summary     Mild to Moderate mitral regurgitation is present.   Fibrocalcific changes noted to the mitral valve leaflets with preserved   leaflet excursion.   Mild mitral annular calcification is present.   Mild to Moderate mitral regurgitation is present.   EA reversal of the mitral inflow consistent with reduced compliance of the   left ventricle   There are fibrocalcific changes noted to the aortic valve leaflets with   restriction in leaflet excursion. Transaortic gradients are slightly   elevated but do not meet the criteria for aortic stenosis. No aortic   insufficiency noted.   Trace to mild tricuspid valve regurgitation is present.   Normal appearing pulmonic valve structure and function.   Normal appearing left atrium.   The left ventricle is normal in size, wall thickness, wall motion and   contractility.   Estimated left ventricular ejection fraction is 55-60 %.     Signature     ----------------------------------------------------------------   Electronically signed by Laney Clark MD(Interpreting   physician) on 03/08/2021 05:31 PM   ----------------------------------------------------------------    < end of copied text >  r< from: MR Head No Cont (05.26.21 @ 14:23) >      IMPRESSION:    Numerous new areas of restricted diffusion in the leftfrontal, parietal, occipital, and temporal lobes, left basal ganglia, and right parietal lobe, suggesting acute infarcts. The left-sided infarcts have a watershed distribution. Areas of subacute infarct in the left parietal, occipital and temporal lobes are again seen. There are areas of susceptibility artifact associated with acute and subacute infarcts in the left parietal-occipital region, suggesting hemorrhagic transformation.    These findings were discussed with WIL Ferguson at 5/26/2021 3:05 PMby Dr. Africa Blackmon with read back confirmation.            AFRICA BLACKMON MD; Attending Radiologist  This document has been electronically signed. May 26 2021  3:10PM    < end of copied text >

## 2021-05-30 PROCEDURE — 99232 SBSQ HOSP IP/OBS MODERATE 35: CPT

## 2021-05-30 PROCEDURE — 99233 SBSQ HOSP IP/OBS HIGH 50: CPT

## 2021-05-30 RX ORDER — METOPROLOL TARTRATE 50 MG
25 TABLET ORAL DAILY
Refills: 0 | Status: DISCONTINUED | OUTPATIENT
Start: 2021-05-30 | End: 2021-06-04

## 2021-05-30 RX ADMIN — Medication 1 DROP(S): at 22:30

## 2021-05-30 RX ADMIN — Medication 650 MILLIGRAM(S): at 15:52

## 2021-05-30 RX ADMIN — Medication 25 MILLIGRAM(S): at 12:32

## 2021-05-30 RX ADMIN — HEPARIN SODIUM 5000 UNIT(S): 5000 INJECTION INTRAVENOUS; SUBCUTANEOUS at 12:32

## 2021-05-30 RX ADMIN — Medication 1 DROP(S): at 10:41

## 2021-05-30 RX ADMIN — HEPARIN SODIUM 5000 UNIT(S): 5000 INJECTION INTRAVENOUS; SUBCUTANEOUS at 06:38

## 2021-05-30 RX ADMIN — CEFTRIAXONE 2000 MILLIGRAM(S): 500 INJECTION, POWDER, FOR SOLUTION INTRAMUSCULAR; INTRAVENOUS at 10:40

## 2021-05-30 RX ADMIN — Medication 650 MILLIGRAM(S): at 16:22

## 2021-05-30 RX ADMIN — Medication 81 MILLIGRAM(S): at 10:40

## 2021-05-30 RX ADMIN — ATORVASTATIN CALCIUM 80 MILLIGRAM(S): 80 TABLET, FILM COATED ORAL at 22:59

## 2021-05-30 RX ADMIN — Medication 650 MILLIGRAM(S): at 23:00

## 2021-05-30 RX ADMIN — CLOPIDOGREL BISULFATE 75 MILLIGRAM(S): 75 TABLET, FILM COATED ORAL at 10:40

## 2021-05-30 RX ADMIN — HEPARIN SODIUM 5000 UNIT(S): 5000 INJECTION INTRAVENOUS; SUBCUTANEOUS at 22:59

## 2021-05-30 RX ADMIN — CHLORHEXIDINE GLUCONATE 1 APPLICATION(S): 213 SOLUTION TOPICAL at 06:38

## 2021-05-30 NOTE — PROGRESS NOTE ADULT - ASSESSMENT
87 y.o. 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    1. Recurrent embolic CVA with unclear etiology - needs hypercoagulable w/u and in the settings of bacteremia would repeat FABIO to r/o septic emboli   - no AF on ILR interrogation   - cont ASA/plavix/statin  keep corepack   - unclear source of bacteremia - cont IV abx, ID eval, possible FABIO, repeat BCx, neg EEGs  sludge in GB  case d/w neuro; repeat CT brain to see if stable    2. GB sludge w/o evidence of cholecystitis    3. Hx of RA off humira     4. VTE proph - UFH    Pall consult    Discussed with family, Neurology, SS, CICU team   87 y.o. 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    1. Recurrent embolic CVA with unclear etiology - needs hypercoagulable w/u and in the settings of bacteremia would repeat FABIO to r/o septic emboli   - no AF on ILR interrogation   - cont ASA/plavix/statin   - unclear source of bacteremia - cont IV abx, ID eval, possible FABIO, repeat BCx, neg EEGs  sludge in GB  FABIO Tue  add low dose BB    2. GB sludge w/o evidence of cholecystitis    3. Hx of RA off humira     4. VTE proph - UFH    Pall consult    PT eval and Rehab early nxt week

## 2021-05-30 NOTE — PROGRESS NOTE ADULT - SUBJECTIVE AND OBJECTIVE BOX
Interval History:  5/30/21: Patient more awake today    MEDICATIONS  (STANDING):  artificial  tears Solution 1 Drop(s) Both EYES two times a day  aspirin  chewable 81 milliGRAM(s) Oral daily  atorvastatin 80 milliGRAM(s) Oral at bedtime  cefTRIAXone Injectable. 2000 milliGRAM(s) IV Push every 24 hours  chlorhexidine 4% Liquid 1 Application(s) Topical <User Schedule>  clopidogrel Tablet 75 milliGRAM(s) Oral daily  heparin   Injectable 5000 Unit(s) SubCutaneous every 8 hours    MEDICATIONS  (PRN):  acetaminophen   Tablet .. 650 milliGRAM(s) Oral every 8 hours PRN Mild Pain (1 - 3)      Allergies    penicillin (Unknown)    Intolerances        PHYSICAL EXAM:  Vital Signs Last 24 Hrs  T(F): 98.7 (05-30-21 @ 07:52)  HR: 104 (05-30-21 @ 10:00)  BP: 147/61 (05-30-21 @ 10:00)  RR: 22 (05-30-21 @ 10:00)    GENERAL: NAD, well-groomed, well-developed  HEAD:  Atraumatic, Normocephalic  Neuro:  Awake, alert.   Follows simple commands with visual cues and others without visual cues (smile, stick out your tongue)  + Expressive aphasia   CN: PERRL, EOMI, Right facial droop  Attempted to state name, markedly dysarthric  Motor: moving left upper extremity well. Moves left leg. + right sided weakness  Sensory: Decreased withdrawal on right  Reflexes +2 Rt brisk +2 left UE Plantars Up going Rt down going Left  Gait: Unable to test        LABS:                        13.9   7.80  )-----------( 165      ( 29 May 2021 06:26 )             42.1     05-29    137  |  106  |  31<H>  ----------------------------<  138<H>  4.5   |  27  |  0.82    Ca    9.5      29 May 2021 06:26    TPro  6.8  /  Alb  2.8<L>  /  TBili  0.5  /  DBili  x   /  AST  50<H>  /  ALT  100<H>  /  AlkPhos  185<H>  05-29          RADIOLOGY & ADDITIONAL STUDIES:    CT head 5/24/21:  1)  chronic ischemic changes in both hemispheres with volume loss and involutional change. No acute infarct, hemorrhage, or space occupying lesion suggested.  2)  follow-up MR imaging may be considered for further assessment.    CTA head/CTA neck/CT perfusion 5/24/21:  1. The CT perfusion study suggests generalized hypoperfusion of both hemispheres as well as within the posterior fossa without a focal abnormality. No area of core infarction. See above.  2. Moderate calcified plaque right carotid bifurcation but without significant stenosis. Focal stenosis left internal carotid 1 cm above the bifurcation with no tandemlesion noted.  3. Patent vertebrals.  4. Widely patent vasculature noted intracranially. Mild scattered atherosclerotic changes. No occlusion or tight stenosis suggested.    CT head 5/25/21:  Area of hypodensity in the left parietal occipital region suggesting acute/subacute infarct. No evidence of hemorrhagic transformation.    MR head 5/26/21:  Numerous new areas of restricted diffusion in the left frontal, parietal, occipital, and temporal lobes, left basal ganglia, and right parietal lobe, suggesting acute infarcts. The left-sided infarcts have a watershed distribution. Areas of subacute infarct in the left parietal, occipital and temporal lobes are again seen. There are areas of susceptibility artifact associated with acute and subacute infarcts in the left parietal-occipital region, suggesting hemorrhagic transformation.    eeg 5/25/21:  1.This is an abnormal EEG due to the presence of mild generalized background slowing may be on the basis of metabolic causes.  2. Marked left frontal and temporal slowing indicative of underlying structural pathology. Clinical correlation recommended.  3. No epileptiform activity noted. Clinical correlation recommended.    24 Hour eeg 5/26/21-5/27/21:  This is an abnormal prolonged video EEG due to the presence of   Mild generalized slowing with superimposed moderate to severe slowing on the left side indicative of underlying structural pathology. Clinical correlation recommended.  No epileptiform activity noted. Dr. Martínez informed of the results.      CT head 5/29/21:  Small acute cortical infarctions again seen in in the LEFT frontal and parietal lobes. Acute/subacute white matter infarctions also seen in the LEFT centrum semiovale. Mild periventricular and deep white matter ischemia is noted with old lacunar infarctions in the BILATERAL basal ganglia.

## 2021-05-30 NOTE — PROGRESS NOTE ADULT - SUBJECTIVE AND OBJECTIVE BOX
87 y.o. 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    INTERVAL HPI: ROS UTO due to aphasia    Vital Signs Last 24 Hrs  T(C): 37.1 (30 May 2021 07:52), Max: 37.1 (30 May 2021 07:52)  T(F): 98.7 (30 May 2021 07:52), Max: 98.7 (30 May 2021 07:52)  HR: 109 (30 May 2021 12:00) (91 - 109)  BP: 141/69 (30 May 2021 12:00) (122/56 - 147/61)  BP(mean): 88 (30 May 2021 12:00) (72 - 88)  RR: 20 (30 May 2021 12:00) (14 - 25)  SpO2: 95% (30 May 2021 12:00) (91% - 96%)            PHYSICAL EXAM:    General: male in no acute distress  Eyes: conjunctiva and sclera clear  Head: Normocephalic; atraumatic, BL temporal wasting  ENMT: corpak in place  Neck: Supple; non tender; no masses  Respiratory: No wheezes, rales or rhonchi  Cardiovascular: S1, S2 reg  Gastrointestinal: Soft non-tender non-distended; Normal bowel sounds  Genitourinary: No costovertebral angle tenderness  Extremities BL rheumatoid hands deformities, no cyanosis or edema  Vascular: Peripheral pulses palpable 2+ bilaterally  Neurological: Alert, aphasic awake not following commands  Skin: Warm and dry.                               13.9   7.80  )-----------( 165      ( 29 May 2021 06:26 )             42.1   05-29    137  |  106  |  31<H>  ----------------------------<  138<H>  4.5   |  27  |  0.82    Ca    9.5      29 May 2021 06:26    TPro  6.8  /  Alb  2.8<L>  /  TBili  0.5  /  DBili  x   /  AST  50<H>  /  ALT  100<H>  /  AlkPhos  185<H>  05-29                          87 y.o. 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    INTERVAL HPI: ROS UTO due to aphasia    Vital Signs Last 24 Hrs  T(C): 37.1 (30 May 2021 07:52), Max: 37.1 (30 May 2021 07:52)  T(F): 98.7 (30 May 2021 07:52), Max: 98.7 (30 May 2021 07:52)  HR: 109 (30 May 2021 12:00) (91 - 109)  BP: 141/69 (30 May 2021 12:00) (122/56 - 147/61)  BP(mean): 88 (30 May 2021 12:00) (72 - 88)  RR: 20 (30 May 2021 12:00) (14 - 25)  SpO2: 95% (30 May 2021 12:00) (91% - 96%)            PHYSICAL EXAM:    General: male in no acute distress  Eyes: conjunctiva and sclera clear  Head: Normocephalic; atraumatic, BL temporal wasting  ENMT: corpak in place  Neck: Supple; non tender; no masses  Respiratory: No wheezes, rales or rhonchi  Cardiovascular: S1, S2 reg  Gastrointestinal: Soft non-tender non-distended; Normal bowel sounds  Genitourinary: No costovertebral angle tenderness  Extremities BL rheumatoid hands deformities, no cyanosis or edema  Vascular: Peripheral pulses palpable 2+ bilaterally  Neurological: Alert, aphasic awake not following commands  Skin: Warm and dry.                             13.9   7.80  )-----------( 165      ( 29 May 2021 06:26 )             42.1   05-29    137  |  106  |  31<H>  ----------------------------<  138<H>  4.5   |  27  |  0.82    Ca    9.5      29 May 2021 06:26    TPro  6.8  /  Alb  2.8<L>  /  TBili  0.5  /  DBili  x   /  AST  50<H>  /  ALT  100<H>  /  AlkPhos  185<H>  05-29

## 2021-05-30 NOTE — PROGRESS NOTE ADULT - ASSESSMENT
87 YO M with a PMH of RA, Diverticulitis, CVA in 3/2021 and CVA 5/14. In ER acutely developed RUE weakness and aphasia. Neutropenic with elevated lactic acid .  Also found ot have bacteremia.    # Acute Left CVA with Rt Hemiparesis   -Patient was not a  TPA candidate due to Recent CVA and Pt's medical condition with Hypoxia and Sepsis-NO Intervention as no LVO  -Follow up MRI showed numerous areas of restricted diffusion. Some may be related to watershed infarcts, but there is still a ? of embolic phenomenon. ILR interrogation was negative for a-fib. Possible repeat FABIO (FABIO several weeks ago negative for thrombus).  -Continue Aspirin +  Plavix at this time.  -Continue Statin  -F/U CT performed 5/29 did not show any evidence of hemorrhagic conversion  -Speech and swallow follow-u appreciated  -PT  -Cardiology follow up appreciated      # Sepsis   -Follow up with ID/ Antibiotics      Will follow up as needed

## 2021-05-30 NOTE — PROGRESS NOTE ADULT - SUBJECTIVE AND OBJECTIVE BOX
Date of service: 05-30-21 @ 10:57    pt seen and examined  alert, awake  laying in bed, nad  confused, aphasic    ROS: unable to obtain d/t medical condition    MEDICATIONS  (STANDING):  artificial  tears Solution 1 Drop(s) Both EYES two times a day  aspirin  chewable 81 milliGRAM(s) Oral daily  atorvastatin 80 milliGRAM(s) Oral at bedtime  cefTRIAXone Injectable. 2000 milliGRAM(s) IV Push every 24 hours  chlorhexidine 4% Liquid 1 Application(s) Topical <User Schedule>  clopidogrel Tablet 75 milliGRAM(s) Oral daily  heparin   Injectable 5000 Unit(s) SubCutaneous every 8 hours    Vital Signs Last 24 Hrs  T(C): 37.1 (30 May 2021 07:52), Max: 37.4 (29 May 2021 12:00)  T(F): 98.7 (30 May 2021 07:52), Max: 99.3 (29 May 2021 12:00)  HR: 104 (30 May 2021 10:00) (86 - 107)  BP: 147/61 (30 May 2021 10:00) (121/50 - 147/61)  BP(mean): 83 (30 May 2021 10:00) (68 - 88)  RR: 22 (30 May 2021 10:00) (14 - 25)  SpO2: 93% (30 May 2021 10:00) (88% - 96%)      PE:  Constitutional: frail looking, aphasic   HEENT: NC/AT, EOMI, PERRLA, conjunctivae clear; ears and nose atraumatic; pharynx benign  Neck: supple; thyroid not palpable  Back: no tenderness  Respiratory: respiratory effort normal; clear to auscultation  Cardiovascular: S1S2 regular, no murmurs  Abdomen: soft, not tender, not distended, positive BS; liver and spleen WNL  Genitourinary: no suprapubic tenderness  Lymphatic: no LN palpable  Musculoskeletal: no muscle tenderness, no joint swelling or tenderness  Extremities: no pedal edema  Neurological/ Psychiatric:  aphasic, R hemiparesis  Skin: no rashes; no palpable lesions    Labs: all available labs reviewed                                   13.9   7.80  )-----------( 165      ( 29 May 2021 06:26 )             42.1     05-29    137  |  106  |  31<H>  ----------------------------<  138<H>  4.5   |  27  |  0.82    Ca    9.5      29 May 2021 06:26    TPro  6.8  /  Alb  2.8<L>  /  TBili  0.5  /  DBili  x   /  AST  50<H>  /  ALT  100<H>  /  AlkPhos  185<H>  05-29      Culture - Blood (05.27.21 @ 09:59)   Specimen Source: .Blood None   Culture Results:   No growth to date.     Culture - Urine (05.24.21 @ 15:04)   Specimen Source: .Urine None   Culture Results:   No growth     Culture - Blood (05.24.21 @ 12:12)   - Escherichia coli: Detec   Gram Stain:   Growth in aerobic bottle:   Gram Negative Rods   Growth in anaerobic bottle:   Gram Negative Rods   - Amikacin: S <=16   - Ampicillin: S <=8 These ampicillin results predict results for amoxicillin   - Ampicillin/Sulbactam: S <=4/2 Enterobacter, Citrobacter, and Serratia may develop resistance during prolonged therapy (3-4 days)   - Aztreonam: S <=4   - Cefazolin: S <=2 Enterobacter, Citrobacter, and Serratia may develop resistance during prolonged therapy (3-4 days)   - Cefepime: S <=2   - Cefoxitin: S <=8   - Ceftriaxone: S <=1 Enterobacter, Citrobacter, and Serratia may develop resistance during prolonged therapy   - Ciprofloxacin: R >2   - Ertapenem: S <=0.5   - Gentamicin: S <=2   - Imipenem: S <=1   - Levofloxacin: R >4   - Meropenem: S <=1   - Piperacillin/Tazobactam: S <=8   - Tobramycin: S <=2   - Trimethoprim/Sulfamethoxazole: S <=0.5/9.5   Specimen Source: .Blood None   Organism: Blood Culture PCR   Organism: Escherichia coli   Culture Results:   Growth in aerobic and anaerobic bottles: Escherichia coli       Radiology: all available radiological tests reviewed    < from: CT Head No Cont (05.29.21 @ 14:23) >  EXAM:  CT BRAIN                            PROCEDURE DATE:  05/29/2021          INTERPRETATION:  CT head without IV contrast    CLINICAL INFORMATION:  Follow-up   Intracranial hemorrhage.    TECHNIQUE: Contiguous axial 5 mm sections were obtained through the head. Sagittal and coronal 2-D reformatted images were also obtained.   This scan was performed using automatic exposure control (radiation dose reduction software) to obtain a diagnostic image quality scan with patient dose as low as reasonably achievable.    FINDINGS:   MRI dated 05/26/2021 available for review.    The brain demonstrates small acute cortical infarctions again seen in in the LEFT frontal and parietal lobes. Acute/subacute white matter infarctions also seen in the LEFT centrum semiovale. Mild periventricular and deep white matter ischemia is noted with old lacunar infarctions in the BILATERAL basal ganglia. No intracranial hemorrhage is found.  No mass effect is found in the brain.    The ventricles, sulci and basalcisterns appear unremarkable.    The orbits are unremarkable.  The paranasal sinuses are clear.  The nasal cavity appears intact.  The nasopharynx is symmetric.  The central skull base, petrous temporal bones and calvarium remain intact.      IMPRESSION:   Small acute cortical infarctions again seen in in the LEFT frontal and parietal lobes. Acute/subacute white matter infarctions also seen in the LEFT centrum semiovale. Mild periventricular and deep white matter ischemia is noted with old lacunar infarctions in the BILATERAL basal ganglia.    < end of copied text >  < from: US Abdomen Complete (US Abdomen Complete .) (05.25.21 @ 11:20) >    EXAM:  US ABDOMEN COMPLETE                            PROCEDURE DATE:  05/25/2021          INTERPRETATION:  CLINICAL INFORMATION: Evaluate right upper quadrant and gallbladder.    COMPARISON: CT abdomen May 24, 2020    TECHNIQUE: Sonography of the abdomen.    FINDINGS:    Liver: Mildly increased hepatic echogenicity. Smooth contours. No focal lesion.  Bile ducts: Normal caliber. Common bile duct measures 4 mm.  Gallbladder: Rounded echogenic nodule measuring 3.2 x 2.7 cm without shadowing or flow. No wall thickening or pericholecystic fluid  Pancreas: Visualized portions are within normal limits.  Spleen: 11.91. Within normal limits.  Right kidney: 14.1 cm. No hydronephrosis.  Left kidney: 11.6 cm.  No hydronephrosis. 3.7 cm left renal cyst.  Ascites: None.  Aorta and IVC: Visualized portions are within normal limits.    IMPRESSION:  Echogenic mass in the gallbladder compatible with tumefactive sludge    No evidence of acute cholecystitis.        < end of copied text >  < from: CT Abdomen and Pelvis w/ IV Cont (05.24.21 @ 12:46) >  EXAM:  CT ABDOMEN AND PELVIS IC                            PROCEDURE DATE:  05/24/2021          INTERPRETATION:  CLINICAL INFORMATION: Diffuse abdominal pain.    COMPARISON: CT abdomen/pelvis 5/23/2020    CONTRAST/COMPLICATIONS:  IV Contrast: Administered at the time of CTA head and neck of the same day.  Oral Contrast: NONE  Complications: None reported at time of study completion    PROCEDURE:  CT of the Abdomen and Pelvis was performed.  Sagittal and coronal reformats were performed.    FINDINGS:  LOWER CHEST: 1.3 cm cyst in the right lower lobe. Linear atelectasis or scarring at the lung bases. Small hiatal hernia. Coronary artery calcifications.    LIVER: Within normal limits.  BILE DUCTS: Normal caliber. Mild enhancement of the commonbile duct  GALLBLADDER: Distended. Mild wall enhancement. No wall thickening or calcified gallstones.  SPLEEN: Within normal limits.  PANCREAS: Fatty infiltration of the pancreatic head.  ADRENALS: Unremarkable.  KIDNEYS/URETERS: Excreted intravenouscontrast in both renal collecting systems. No hydronephrosis. 2.2 cm left renal cyst.    BLADDER: Mild wall thickening. Small bladder diverticulum arising from the right bladder wall.  REPRODUCTIVE ORGANS: Median lobe hypertrophy of the prostate withintravesicular protrusion.    BOWEL: Colonic diverticulosis without evidence of diverticulitis. No bowel obstruction. Nonspecific mild specific haziness of the fat in the small bowel mesentery with mildly prominent mesenteric lymph nodes, unchanged since 5/23/2020.  PERITONEUM: No ascites. Small ovoid partially calcified structure at the anterior aspect of the pelvis (series 2 image 101), possibly a lymph node. Clips again seen in the right lower quadrant.  VESSELS: Ectasia of the intrarenal abdominal aorta measuring 2.6 x 2.5 cm, unchanged. Atherosclerotic changes.  RETROPERITONEUM/LYMPH NODES: No lymphadenopathy.  ABDOMINAL WALL: There appears to be a hernia plug in the right inguinal region.  BONES: Chronic compression deformity of the superior endplate of T11. Stable sclerotic lesion in L5.    IMPRESSION:  Mildly distended gallbladder with mild gallbladder wall and common bile duct enhancement.    No gallbladder wall thickening or calcified gallstones; correlation is recommended with LFTs and a right upper quadrant ultrasound could be obtained for further evaluation.        EXAM:  MR BRAIN                            PROCEDURE DATE:  05/26/2021          INTERPRETATION:  CLINICAL INFORMATION: recurrent stroke    recurrent right sided weaknes. recurrent stroke    TECHNIQUE: Multiplanar, multisequence brain MRI was performed without intravenous contrast    COMPARISON: CT head 5/25/2021.  MRI brain 5/14/2021..    FINDINGS:    Numerous new areas of restricted diffusion in the left frontal, parietal, occipital, and temporal lobes, left basal ganglia, and right parietal lobe, suggesting acute infarcts. The left-sided infarcts have a watershed distribution. Areas of subacute infarct in the left parietal, occipital and temporal lobes are again seen. There are areas of susceptibility artifact associated with acute and subacute infarcts in the left parietal-occipital region, suggesting hemorrhagic transformation.    Chronic lacunar infarcts in the left basal ganglia, left cerebellum and right centrum semiovale. Chronic right parietal infarct. Scattered foci of T2/FLAIR hyperintensity in the bilateral hemispheric white matter are nonspecific but likely related to chronic white matter microvascular ischemic disease. There is cerebral volume loss.    Flow voids of the major intracranial vessels at the skull base follow expected course and contour.    Mild bilateral ethmoid sinus mucosal thickening. The mastoids demonstrate no signal abnormality.  Status post right intraocular lens implant.      IMPRESSION:    Numerous new areas of restricted diffusion in the leftfrontal, parietal, occipital, and temporal lobes, left basal ganglia, and right parietal lobe, suggesting acute infarcts. The left-sided infarcts have a watershed distribution. Areas of subacute infarct in the left parietal, occipital and temporal lobes are again seen. There are areas of susceptibility artifact associated with acute and subacute infarcts in the left parietal-occipital region, suggesting hemorrhagic transformation.    These findings were discussed with WIL Ferguson at 5/26/2021 3:05 PMby Dr. Franko Blackmon with read back confirmation.        Advanced directives addressed: full resuscitation

## 2021-05-30 NOTE — PROGRESS NOTE ADULT - ASSESSMENT
87 y.o. 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 US abd remarkable for GB sludge, UA/urine cx unremarkable, was given IV cefepime 5/24-5/27, now on IV rocephin since 5/28, ? etiology - gb sludge/gut translocation? had previous FABIO 5/15 no vegetation or thrombus and repeat blood cx 5/27, 5/28 no growth so far.     1. Sepsis with Ecoli. GB sludge. Acute L embolic CVA  - imaging reviewed, neurology follow up noted, etiology of cva unclear  - etiology of ecoli ? gb sludge/gut translocation; urine unremarkable  - previous FABIO 5/15 no vegetation or thrombus  - suggest repeat FABIO   - repeat blood cx 5/27, 5/28 no growth  - s/p cefepime #3 5/24-5/27  - on IV rocephin 2gm daily #4   - continue with antibiotic coverage  - fu cbc  - tolerating abx well so far; no side effects noted  - reason for abx use and side effects reviewed with patient  - supportive care    2. other issues - care per medicine

## 2021-05-31 LAB — B2 GLYCOPROT1 AB SER QL: NEGATIVE — SIGNIFICANT CHANGE UP

## 2021-05-31 PROCEDURE — 99232 SBSQ HOSP IP/OBS MODERATE 35: CPT

## 2021-05-31 PROCEDURE — 99233 SBSQ HOSP IP/OBS HIGH 50: CPT

## 2021-05-31 RX ADMIN — HEPARIN SODIUM 5000 UNIT(S): 5000 INJECTION INTRAVENOUS; SUBCUTANEOUS at 21:05

## 2021-05-31 RX ADMIN — Medication 81 MILLIGRAM(S): at 08:54

## 2021-05-31 RX ADMIN — HEPARIN SODIUM 5000 UNIT(S): 5000 INJECTION INTRAVENOUS; SUBCUTANEOUS at 13:17

## 2021-05-31 RX ADMIN — ATORVASTATIN CALCIUM 80 MILLIGRAM(S): 80 TABLET, FILM COATED ORAL at 21:05

## 2021-05-31 RX ADMIN — CLOPIDOGREL BISULFATE 75 MILLIGRAM(S): 75 TABLET, FILM COATED ORAL at 08:54

## 2021-05-31 RX ADMIN — Medication 25 MILLIGRAM(S): at 08:54

## 2021-05-31 RX ADMIN — Medication 650 MILLIGRAM(S): at 00:00

## 2021-05-31 RX ADMIN — CEFTRIAXONE 2000 MILLIGRAM(S): 500 INJECTION, POWDER, FOR SOLUTION INTRAMUSCULAR; INTRAVENOUS at 08:54

## 2021-05-31 RX ADMIN — HEPARIN SODIUM 5000 UNIT(S): 5000 INJECTION INTRAVENOUS; SUBCUTANEOUS at 05:45

## 2021-05-31 RX ADMIN — Medication 1 DROP(S): at 21:04

## 2021-05-31 RX ADMIN — CHLORHEXIDINE GLUCONATE 1 APPLICATION(S): 213 SOLUTION TOPICAL at 08:58

## 2021-05-31 RX ADMIN — Medication 1 DROP(S): at 08:54

## 2021-05-31 NOTE — PROGRESS NOTE ADULT - SUBJECTIVE AND OBJECTIVE BOX
Date of service: 05-31-21 @ 12:24    pt seen and examined  alert, awake  laying in bed, nad  corpack removed  able to tolerate some po    ROS: unable to obtain d/t medical condition    MEDICATIONS  (STANDING):  artificial  tears Solution 1 Drop(s) Both EYES two times a day  aspirin  chewable 81 milliGRAM(s) Oral daily  atorvastatin 80 milliGRAM(s) Oral at bedtime  cefTRIAXone Injectable. 2000 milliGRAM(s) IV Push every 24 hours  chlorhexidine 4% Liquid 1 Application(s) Topical <User Schedule>  clopidogrel Tablet 75 milliGRAM(s) Oral daily  heparin   Injectable 5000 Unit(s) SubCutaneous every 8 hours  metoprolol succinate ER 25 milliGRAM(s) Oral daily    Vital Signs Last 24 Hrs  T(C): 37.1 (31 May 2021 07:49), Max: 38.6 (30 May 2021 15:53)  T(F): 98.7 (31 May 2021 07:49), Max: 101.4 (30 May 2021 15:53)  HR: 86 (31 May 2021 07:49) (86 - 105)  BP: 141/69 (31 May 2021 07:49) (131/52 - 142/76)  BP(mean): 77 (30 May 2021 20:00) (72 - 77)  RR: 20 (31 May 2021 07:49) (17 - 25)  SpO2: 96% (31 May 2021 07:49) (92% - 96%)      PE:  Constitutional: frail looking, aphasic   HEENT: NC/AT, EOMI, PERRLA, conjunctivae clear; ears and nose atraumatic; pharynx benign  Neck: supple; thyroid not palpable  Back: no tenderness  Respiratory: respiratory effort normal; clear to auscultation  Cardiovascular: S1S2 regular, no murmurs  Abdomen: soft, not tender, not distended, positive BS; liver and spleen WNL  Genitourinary: no suprapubic tenderness  Lymphatic: no LN palpable  Musculoskeletal: no muscle tenderness, no joint swelling or tenderness  Extremities: no pedal edema  Neurological/ Psychiatric:  aphasic, R hemiparesis  Skin: no rashes; no palpable lesions    Labs: all available labs reviewed        Culture - Blood (05.27.21 @ 09:59)   Specimen Source: .Blood None   Culture Results:   No growth to date.     Culture - Urine (05.24.21 @ 15:04)   Specimen Source: .Urine None   Culture Results:   No growth     Culture - Blood (05.24.21 @ 12:12)   - Escherichia coli: Detec   Gram Stain:   Growth in aerobic bottle:   Gram Negative Rods   Growth in anaerobic bottle:   Gram Negative Rods   - Amikacin: S <=16   - Ampicillin: S <=8 These ampicillin results predict results for amoxicillin   - Ampicillin/Sulbactam: S <=4/2 Enterobacter, Citrobacter, and Serratia may develop resistance during prolonged therapy (3-4 days)   - Aztreonam: S <=4   - Cefazolin: S <=2 Enterobacter, Citrobacter, and Serratia may develop resistance during prolonged therapy (3-4 days)   - Cefepime: S <=2   - Cefoxitin: S <=8   - Ceftriaxone: S <=1 Enterobacter, Citrobacter, and Serratia may develop resistance during prolonged therapy   - Ciprofloxacin: R >2   - Ertapenem: S <=0.5   - Gentamicin: S <=2   - Imipenem: S <=1   - Levofloxacin: R >4   - Meropenem: S <=1   - Piperacillin/Tazobactam: S <=8   - Tobramycin: S <=2   - Trimethoprim/Sulfamethoxazole: S <=0.5/9.5   Specimen Source: .Blood None   Organism: Blood Culture PCR   Organism: Escherichia coli   Culture Results:   Growth in aerobic and anaerobic bottles: Escherichia coli       Radiology: all available radiological tests reviewed    < from: CT Head No Cont (05.29.21 @ 14:23) >  EXAM:  CT BRAIN                            PROCEDURE DATE:  05/29/2021          INTERPRETATION:  CT head without IV contrast    CLINICAL INFORMATION:  Follow-up   Intracranial hemorrhage.    TECHNIQUE: Contiguous axial 5 mm sections were obtained through the head. Sagittal and coronal 2-D reformatted images were also obtained.   This scan was performed using automatic exposure control (radiation dose reduction software) to obtain a diagnostic image quality scan with patient dose as low as reasonably achievable.    FINDINGS:   MRI dated 05/26/2021 available for review.    The brain demonstrates small acute cortical infarctions again seen in in the LEFT frontal and parietal lobes. Acute/subacute white matter infarctions also seen in the LEFT centrum semiovale. Mild periventricular and deep white matter ischemia is noted with old lacunar infarctions in the BILATERAL basal ganglia. No intracranial hemorrhage is found.  No mass effect is found in the brain.    The ventricles, sulci and basalcisterns appear unremarkable.    The orbits are unremarkable.  The paranasal sinuses are clear.  The nasal cavity appears intact.  The nasopharynx is symmetric.  The central skull base, petrous temporal bones and calvarium remain intact.      IMPRESSION:   Small acute cortical infarctions again seen in in the LEFT frontal and parietal lobes. Acute/subacute white matter infarctions also seen in the LEFT centrum semiovale. Mild periventricular and deep white matter ischemia is noted with old lacunar infarctions in the BILATERAL basal ganglia.    < end of copied text >  < from: US Abdomen Complete (US Abdomen Complete .) (05.25.21 @ 11:20) >    EXAM:  US ABDOMEN COMPLETE                            PROCEDURE DATE:  05/25/2021          INTERPRETATION:  CLINICAL INFORMATION: Evaluate right upper quadrant and gallbladder.    COMPARISON: CT abdomen May 24, 2020    TECHNIQUE: Sonography of the abdomen.    FINDINGS:    Liver: Mildly increased hepatic echogenicity. Smooth contours. No focal lesion.  Bile ducts: Normal caliber. Common bile duct measures 4 mm.  Gallbladder: Rounded echogenic nodule measuring 3.2 x 2.7 cm without shadowing or flow. No wall thickening or pericholecystic fluid  Pancreas: Visualized portions are within normal limits.  Spleen: 11.91. Within normal limits.  Right kidney: 14.1 cm. No hydronephrosis.  Left kidney: 11.6 cm.  No hydronephrosis. 3.7 cm left renal cyst.  Ascites: None.  Aorta and IVC: Visualized portions are within normal limits.    IMPRESSION:  Echogenic mass in the gallbladder compatible with tumefactive sludge    No evidence of acute cholecystitis.        < end of copied text >  < from: CT Abdomen and Pelvis w/ IV Cont (05.24.21 @ 12:46) >  EXAM:  CT ABDOMEN AND PELVIS IC                            PROCEDURE DATE:  05/24/2021          INTERPRETATION:  CLINICAL INFORMATION: Diffuse abdominal pain.    COMPARISON: CT abdomen/pelvis 5/23/2020    CONTRAST/COMPLICATIONS:  IV Contrast: Administered at the time of CTA head and neck of the same day.  Oral Contrast: NONE  Complications: None reported at time of study completion    PROCEDURE:  CT of the Abdomen and Pelvis was performed.  Sagittal and coronal reformats were performed.    FINDINGS:  LOWER CHEST: 1.3 cm cyst in the right lower lobe. Linear atelectasis or scarring at the lung bases. Small hiatal hernia. Coronary artery calcifications.    LIVER: Within normal limits.  BILE DUCTS: Normal caliber. Mild enhancement of the commonbile duct  GALLBLADDER: Distended. Mild wall enhancement. No wall thickening or calcified gallstones.  SPLEEN: Within normal limits.  PANCREAS: Fatty infiltration of the pancreatic head.  ADRENALS: Unremarkable.  KIDNEYS/URETERS: Excreted intravenouscontrast in both renal collecting systems. No hydronephrosis. 2.2 cm left renal cyst.    BLADDER: Mild wall thickening. Small bladder diverticulum arising from the right bladder wall.  REPRODUCTIVE ORGANS: Median lobe hypertrophy of the prostate withintravesicular protrusion.    BOWEL: Colonic diverticulosis without evidence of diverticulitis. No bowel obstruction. Nonspecific mild specific haziness of the fat in the small bowel mesentery with mildly prominent mesenteric lymph nodes, unchanged since 5/23/2020.  PERITONEUM: No ascites. Small ovoid partially calcified structure at the anterior aspect of the pelvis (series 2 image 101), possibly a lymph node. Clips again seen in the right lower quadrant.  VESSELS: Ectasia of the intrarenal abdominal aorta measuring 2.6 x 2.5 cm, unchanged. Atherosclerotic changes.  RETROPERITONEUM/LYMPH NODES: No lymphadenopathy.  ABDOMINAL WALL: There appears to be a hernia plug in the right inguinal region.  BONES: Chronic compression deformity of the superior endplate of T11. Stable sclerotic lesion in L5.    IMPRESSION:  Mildly distended gallbladder with mild gallbladder wall and common bile duct enhancement.    No gallbladder wall thickening or calcified gallstones; correlation is recommended with LFTs and a right upper quadrant ultrasound could be obtained for further evaluation.        EXAM:  MR BRAIN                            PROCEDURE DATE:  05/26/2021          INTERPRETATION:  CLINICAL INFORMATION: recurrent stroke    recurrent right sided weaknes. recurrent stroke    TECHNIQUE: Multiplanar, multisequence brain MRI was performed without intravenous contrast    COMPARISON: CT head 5/25/2021.  MRI brain 5/14/2021..    FINDINGS:    Numerous new areas of restricted diffusion in the left frontal, parietal, occipital, and temporal lobes, left basal ganglia, and right parietal lobe, suggesting acute infarcts. The left-sided infarcts have a watershed distribution. Areas of subacute infarct in the left parietal, occipital and temporal lobes are again seen. There are areas of susceptibility artifact associated with acute and subacute infarcts in the left parietal-occipital region, suggesting hemorrhagic transformation.    Chronic lacunar infarcts in the left basal ganglia, left cerebellum and right centrum semiovale. Chronic right parietal infarct. Scattered foci of T2/FLAIR hyperintensity in the bilateral hemispheric white matter are nonspecific but likely related to chronic white matter microvascular ischemic disease. There is cerebral volume loss.    Flow voids of the major intracranial vessels at the skull base follow expected course and contour.    Mild bilateral ethmoid sinus mucosal thickening. The mastoids demonstrate no signal abnormality.  Status post right intraocular lens implant.      IMPRESSION:    Numerous new areas of restricted diffusion in the leftfrontal, parietal, occipital, and temporal lobes, left basal ganglia, and right parietal lobe, suggesting acute infarcts. The left-sided infarcts have a watershed distribution. Areas of subacute infarct in the left parietal, occipital and temporal lobes are again seen. There are areas of susceptibility artifact associated with acute and subacute infarcts in the left parietal-occipital region, suggesting hemorrhagic transformation.    These findings were discussed with WIL Ferguson at 5/26/2021 3:05 PMby Dr. Franko Blackmon with read back confirmation.        Advanced directives addressed: full resuscitation

## 2021-05-31 NOTE — PROGRESS NOTE ADULT - ASSESSMENT
OFF SERVICE:    87M.  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  readmitted 05/24/2021.  presented to ED c/o vomiting.  in ED, acutely developed RUE weakness and aphasia.    acute L CVA w/ R hemiparesis.  - remain suspicion for embolic etiology.  - patient was not a TPA candidate due to recent CVA.  - 05/29 serial HCT:  no evidence of hemorrhagic conversion.  - 05/26 MR head:  multiple acute infarctions w/ suggestion of hemorrhagic conversion.  - ILR:  negative AF.  - telemetry:  no evidence of AF.  - 05/15 FABIO:  negative thrombi.  consider repeat FABIO.  - ASA + STATIN.  - PT.  - speech pathology.  - Neurology.    sepsis w/ E. coli.  - ? etiology of E. coli.  GI/GB and urine unremarkable.  - 05/15 FABIO:  no vegetations.  consider repeat FABIO.  - 05/27 and 05/28 BCx:  no growth.  - s/p cefepime 05/24-05/27.  - ceftriaxone (d5).  - ID.    distended GB and CBD.  elevated LFTs.  - 05/25 US:  no evidence of acute cholecystitis.  - GI.    DVT prophylaxis.  - UFH sq.    disposition.  - 2S.    communication.  - RN. OFF SERVICE:    87M.  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  readmitted 05/24/2021.  presented to ED c/o vomiting.  in ED, acutely developed RUE weakness and aphasia.    acute L CVA w/ R hemiparesis.  - remain suspicion for embolic etiology.  - patient was not a TPA candidate due to recent CVA.  - 05/29 serial HCT:  no evidence of hemorrhagic conversion.  - 05/26 MR head:  multiple acute infarctions w/ suggestion of hemorrhagic conversion.  - ILR:  negative AF.  - telemetry:  no evidence of AF.  - 05/15 FABIO:  negative thrombi.  consider repeat FABIO.  - ASA + STATIN.  - PT.  - speech pathology.  - Neurology.    sepsis w/ E. coli.  - ? etiology of E. coli.  GI/GB and urine unremarkable.  - 05/15 FABIO:  no vegetations.  consider repeat FABIO.  - 05/27 and 05/28 BCx:  no growth.  - s/p cefepime 05/24-05/27.  - ceftriaxone (d5).  - ID.    distended GB and CBD.  elevated LFTs.  - 05/25 US:  no evidence of acute cholecystitis.  - GI.    DVT prophylaxis.  - UFH sq.    disposition.  - 2S.    communication.  - RN.  - 05/31 patient's wife:  update given.

## 2021-05-31 NOTE — PROGRESS NOTE ADULT - ASSESSMENT
87 y.o. 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 US abd remarkable for GB sludge, UA/urine cx unremarkable, was given IV cefepime 5/24-5/27, now on IV rocephin since 5/28, ? etiology - gb sludge/gut translocation? had previous FABIO 5/15 no vegetation or thrombus and repeat blood cx 5/27, 5/28 no growth so far.     1. Sepsis with Ecoli. GB sludge. Acute L embolic CVA  - imaging reviewed, neurology follow up noted, etiology of cva unclear  - etiology of ecoli ? gb sludge/gut translocation; urine unremarkable  - previous FABIO 5/15 no vegetation or thrombus  - plan for repeat FABIO 6/1  - repeat blood cx 5/27, 5/28 no growth  - s/p cefepime #3 5/24-5/27  - on IV rocephin 2gm daily #5  - continue with antibiotic coverage  - fu cbc  - tolerating abx well so far; no side effects noted  - reason for abx use and side effects reviewed with patient  - supportive care    2. other issues - care per medicine

## 2021-05-31 NOTE — PROGRESS NOTE ADULT - SUBJECTIVE AND OBJECTIVE BOX
CC:  Patient is a 87y old  Male who presents with a chief complaint of CVA (29 May 2021 06:38)    SUBJECTIVE:     -no new complaints or issues at current time.    ROS:  all other review of systems are negative unless indicated above.    acetaminophen   Tablet .. 650 milliGRAM(s) Oral every 8 hours PRN  artificial  tears Solution 1 Drop(s) Both EYES two times a day  aspirin  chewable 81 milliGRAM(s) Oral daily  atorvastatin 80 milliGRAM(s) Oral at bedtime  cefTRIAXone Injectable. 2000 milliGRAM(s) IV Push every 24 hours  chlorhexidine 4% Liquid 1 Application(s) Topical <User Schedule>  clopidogrel Tablet 75 milliGRAM(s) Oral daily  heparin   Injectable 5000 Unit(s) SubCutaneous every 8 hours  metoprolol succinate ER 25 milliGRAM(s) Oral daily    T(C): 37.1 (05-31-21 @ 07:49), Max: 38.6 (05-30-21 @ 15:53)  HR: 86 (05-31-21 @ 07:49) (86 - 109)  BP: 141/69 (05-31-21 @ 07:49) (131/52 - 147/61)  RR: 20 (05-31-21 @ 07:49) (17 - 25)  SpO2: 96% (05-31-21 @ 07:49) (92% - 96%)    Constitutional: NAD.   HEENT: PERRL, EOMI, MMM.  Neck: Soft and supple, No carotid bruit, No JVD  Respiratory: Breath sounds are clear bilaterally, No wheezing, rales or rhonchi  Cardiovascular: S1 and S2, regular rate and rhythm, no murmur, rub or gallop.  Gastrointestinal: Bowel Sounds present, soft, nontender, nondistended, no guarding, no rebound, no mass.  Extremities: No peripheral edema  Vascular: 2+ peripheral pulses  Neurological: A/O x , no focal deficits  Musculoskeletal: 5/5 strength b/l upper and lower extremities  Skin:  no visible rashes.     < from: CT Head No Cont (05.29.21 @ 14:23) >  IMPRESSION:   Small acute cortical infarctions again seen in in the LEFT frontal and parietal lobes. Acute/subacute white matter infarctions also seen in the LEFT centrum semiovale. Mild periventricular and deep white matter ischemia is noted with old lacunar infarctions in the BILATERAL basal ganglia.    < end of copied text >    < from: MR Head No Cont (05.26.21 @ 14:23) >    IMPRESSION:    Numerous new areas of restricted diffusion in the leftfrontal, parietal, occipital, and temporal lobes, left basal ganglia, and right parietal lobe, suggesting acute infarcts. The left-sided infarcts have a watershed distribution. Areas of subacute infarct in the left parietal, occipital and temporal lobes are again seen. There are areas of susceptibility artifact associated with acute and subacute infarcts in the left parietal-occipital region, suggesting hemorrhagic transformation.    < end of copied text >  < from: Xray Kidney Ureter Bladder (05.25.21 @ 12:43) >    IMPRESSION:  Feeding tube to stomach.    < end of copied text >  < from: US Abdomen Complete (US Abdomen Complete .) (05.25.21 @ 11:20) >  IMPRESSION:  Echogenic mass in the gallbladder compatible with tumefactive sludge    No evidence of acute cholecystitis.    < end of copied text >  < from: Xray Chest 1 View- PORTABLE-Routine (Xray Chest 1 View- PORTABLE-Routine in AM.) (05.25.21 @ 09:11) >  IMPRESSION:  Basilar congestion.    < end of copied text >  < from: CT Abdomen and Pelvis w/ IV Cont (05.24.21 @ 12:46) >  IMPRESSION:  Mildly distended gallbladder with mild gallbladder wall and common bile duct enhancement.    No gallbladder wall thickening or calcified gallstones; correlation is recommended with LFTs and a right upper quadrant ultrasound could be obtained for further evaluation.      < end of copied text >  < from: CT Angio Neck w/ IV Cont (05.24.21 @ 12:44) >  IMPRESSION:      1. The CT perfusion study suggests generalized hypoperfusion of both hemispheres as well as within the posterior fossa without a focal abnormality. No area of core infarction. See above.  2. Moderate calcified plaque right carotid bifurcation but without significant stenosis. Focal stenosis left internal carotid 1 cm above the bifurcation with no tandemlesion noted.  3. Patent vertebrals.  4. Widely patent vasculature noted intracranially. Mild scattered atherosclerotic changes. No occlusion or tight stenosis suggested.    < end of copied text >  < from: Xray Chest 1 View- PORTABLE-Urgent (05.24.21 @ 11:41) >  IMPRESSION: Left loop recorder presently seen. No acute finding or change.    < end of copied text >    < from: Transesophageal Echocardiogram (05.15.21 @ 13:36) >  Impression     Summary     Mild (1+) mitral regurgitation is present.   Trace tricuspid valve regurgitation is present.   Mild aortic sclerosis is present with normal valvular opening.   Normal appearing pulmonic valve structure and function.   The left atrium is mildly dilated.   The left ventricle is normal in size, wall thickness, wall motion and   contractility.   Normal appearing right ventricle structure and function.   Normal appearing right atrium.   No intracardiac masses, thrombi or vegetations were seen.   Mild atheroscleorsis of the thoracic aorta.   No PFO/ASD.      < end of copied text >    < from: 12 Lead ECG (05.24.21 @ 11:44) >  Diagnosis Line Sinus tachycardia  Right bundle branch block  Inferior infarct , age undetermined  Abnormal ECG  Confirmed by FLORIDALMA SIM MD ([id number]) on 5/25/2021 7:30:41 AM    < end of copied text >

## 2021-05-31 NOTE — PROGRESS NOTE ADULT - SUBJECTIVE AND OBJECTIVE BOX
Interval History:  5/31/21: No new events    MEDICATIONS  (STANDING):  artificial  tears Solution 1 Drop(s) Both EYES two times a day  aspirin  chewable 81 milliGRAM(s) Oral daily  atorvastatin 80 milliGRAM(s) Oral at bedtime  cefTRIAXone Injectable. 2000 milliGRAM(s) IV Push every 24 hours  chlorhexidine 4% Liquid 1 Application(s) Topical <User Schedule>  clopidogrel Tablet 75 milliGRAM(s) Oral daily  heparin   Injectable 5000 Unit(s) SubCutaneous every 8 hours  metoprolol succinate ER 25 milliGRAM(s) Oral daily    MEDICATIONS  (PRN):  acetaminophen   Tablet .. 650 milliGRAM(s) Oral every 8 hours PRN Mild Pain (1 - 3)      Allergies    penicillin (Unknown)    Intolerances        PHYSICAL EXAM:  Vital Signs Last 24 Hrs  T(F): 98.7 (05-31-21 @ 07:49)  HR: 86 (05-31-21 @ 07:49)  BP: 141/69 (05-31-21 @ 07:49)  RR: 20 (05-31-21 @ 07:49)    GENERAL: NAD, well-groomed, well-developed  HEAD:  Atraumatic, Normocephalic  Neuro:  Awake, alert  Able to follow some simple commands with and without visual cues.  When asked to name objects he was able to name, "thumb" and "specs" for glasses  Unable to name wristwatch  CN:  EOMI, right facial droop, speech dysarthric  motor: right hemiparesis. Moves left upper and lower extremity  Sensory: Decreased withdrawal on right  Reflexes +2 Rt brisk +2 left UE Plantars Up going Rt down going Left  Gait: Unable to test        LABS:                RADIOLOGY & ADDITIONAL STUDIES:      CT head 5/24/21:  1)  chronic ischemic changes in both hemispheres with volume loss and involutional change. No acute infarct, hemorrhage, or space occupying lesion suggested.  2)  follow-up MR imaging may be considered for further assessment.    CTA head/CTA neck/CT perfusion 5/24/21:  1. The CT perfusion study suggests generalized hypoperfusion of both hemispheres as well as within the posterior fossa without a focal abnormality. No area of core infarction. See above.  2. Moderate calcified plaque right carotid bifurcation but without significant stenosis. Focal stenosis left internal carotid 1 cm above the bifurcation with no tandemlesion noted.  3. Patent vertebrals.  4. Widely patent vasculature noted intracranially. Mild scattered atherosclerotic changes. No occlusion or tight stenosis suggested.    CT head 5/25/21:  Area of hypodensity in the left parietal occipital region suggesting acute/subacute infarct. No evidence of hemorrhagic transformation.    MR head 5/26/21:  Numerous new areas of restricted diffusion in the left frontal, parietal, occipital, and temporal lobes, left basal ganglia, and right parietal lobe, suggesting acute infarcts. The left-sided infarcts have a watershed distribution. Areas of subacute infarct in the left parietal, occipital and temporal lobes are again seen. There are areas of susceptibility artifact associated with acute and subacute infarcts in the left parietal-occipital region, suggesting hemorrhagic transformation.    eeg 5/25/21:  1.This is an abnormal EEG due to the presence of mild generalized background slowing may be on the basis of metabolic causes.  2. Marked left frontal and temporal slowing indicative of underlying structural pathology. Clinical correlation recommended.  3. No epileptiform activity noted. Clinical correlation recommended.    24 Hour eeg 5/26/21-5/27/21:  This is an abnormal prolonged video EEG due to the presence of   Mild generalized slowing with superimposed moderate to severe slowing on the left side indicative of underlying structural pathology. Clinical correlation recommended.  No epileptiform activity noted. Dr. Martínez informed of the results.      CT head 5/29/21:  Small acute cortical infarctions again seen in in the LEFT frontal and parietal lobes. Acute/subacute white matter infarctions also seen in the LEFT centrum semiovale. Mild periventricular and deep white matter ischemia is noted with old lacunar infarctions in the BILATERAL basal ganglia.

## 2021-06-01 ENCOUNTER — APPOINTMENT (OUTPATIENT)
Dept: ELECTROPHYSIOLOGY | Facility: CLINIC | Age: 86
End: 2021-06-01

## 2021-06-01 LAB
CULTURE RESULTS: SIGNIFICANT CHANGE UP
SARS-COV-2 RNA SPEC QL NAA+PROBE: SIGNIFICANT CHANGE UP
SPECIMEN SOURCE: SIGNIFICANT CHANGE UP

## 2021-06-01 PROCEDURE — 99232 SBSQ HOSP IP/OBS MODERATE 35: CPT

## 2021-06-01 RX ADMIN — HEPARIN SODIUM 5000 UNIT(S): 5000 INJECTION INTRAVENOUS; SUBCUTANEOUS at 05:28

## 2021-06-01 RX ADMIN — HEPARIN SODIUM 5000 UNIT(S): 5000 INJECTION INTRAVENOUS; SUBCUTANEOUS at 13:48

## 2021-06-01 RX ADMIN — CEFTRIAXONE 2000 MILLIGRAM(S): 500 INJECTION, POWDER, FOR SOLUTION INTRAMUSCULAR; INTRAVENOUS at 09:07

## 2021-06-01 RX ADMIN — HEPARIN SODIUM 5000 UNIT(S): 5000 INJECTION INTRAVENOUS; SUBCUTANEOUS at 21:26

## 2021-06-01 RX ADMIN — Medication 1 DROP(S): at 09:07

## 2021-06-01 RX ADMIN — Medication 25 MILLIGRAM(S): at 13:48

## 2021-06-01 RX ADMIN — Medication 81 MILLIGRAM(S): at 13:47

## 2021-06-01 RX ADMIN — Medication 1 DROP(S): at 21:26

## 2021-06-01 RX ADMIN — CLOPIDOGREL BISULFATE 75 MILLIGRAM(S): 75 TABLET, FILM COATED ORAL at 13:48

## 2021-06-01 RX ADMIN — ATORVASTATIN CALCIUM 80 MILLIGRAM(S): 80 TABLET, FILM COATED ORAL at 21:26

## 2021-06-01 NOTE — PROGRESS NOTE ADULT - SUBJECTIVE AND OBJECTIVE BOX
Patient is a 87y old  Male who presents with a chief complaint of nausea, vomiting, acute stroke.       HPI:  87 yom with recent CVA presented with bacteremia and was noted to have recurrent CVA in the ER.  5/25-   Pt seen this am . Pt is unable to answer questions.  He was unable to sit up in bed also.  Spoke to Dr Martínez.   5/27- pt seen this am.  Pt sleeping soundly.  on NG tube.    5/28- pt seen this am.  Pt denies any CP or SOB.  He is much more alert and he was unable to speak.  NG tube in place.    5/29- pt seen this am. no overnight issues per staff.  on TF.     6/1- pt seen this am.  Pt denies any symptoms.    PAST MEDICAL & SURGICAL HISTORY:  Rheumatoid arthritis    Diverticulitis    History of hernia repair    History of appendectomy        MEDICATIONS  (STANDING):  aspirin Suppository 300 milliGRAM(s) Rectal daily  atorvastatin 80 milliGRAM(s) Oral at bedtime  cefepime   IVPB 2000 milliGRAM(s) IV Intermittent every 24 hours  chlorhexidine 4% Liquid 1 Application(s) Topical <User Schedule>  heparin   Injectable 5000 Unit(s) SubCutaneous every 8 hours  lactated ringers. 1000 milliLiter(s) (75 mL/Hr) IV Continuous <Continuous>  pantoprazole  Injectable 40 milliGRAM(s) IV Push daily    MEDICATIONS  (PRN):      FAMILY HISTORY: unable to obtain from pt.       SOCIAL HISTORY: no recent smoking      REVIEW OF SYSTEMS:  CONSTITUTIONAL:    No fatigue, malaise, lethargy.  no fever or chills.  RESPIRATORY:  No cough.  No wheeze.  No hemoptysis.  No shortness of breath.  CARDIOVASCULAR:  No chest pains.  No palpitations. No shortness of breath, No orthopnea or PND.  GASTROINTESTINAL:  No abdominal pain.  No nausea or vomiting.    GENITOURINARY:    No hematuria.    MUSCULOSKELETAL:  No musculoskeletal pain.  No joint swelling.  No arthritis.  NEUROLOGICAL:  per HPI  PSYCHIATRIC:  No confusion  SKIN:  No rashes.          ICU Vital Signs Last 24 Hrs  T(C): 36.6 (01 Jun 2021 07:20), Max: 37 (31 May 2021 23:24)  T(F): 97.8 (01 Jun 2021 07:20), Max: 98.6 (31 May 2021 23:24)  HR: 84 (01 Jun 2021 07:20) (84 - 92)  BP: 121/62 (01 Jun 2021 07:20) (107/50 - 122/46)  BP(mean): --  ABP: --  ABP(mean): --  RR: 18 (01 Jun 2021 07:20) (18 - 18)  SpO2: 95% (01 Jun 2021 07:20) (93% - 95%)      PHYSICAL EXAM-    Constitutional:  no acute distress     Head: Head is normocephalic and atraumatic.      Neck: No JVD.     Cardiovascular: Regular rate and rhythm without S3, S4. No murmurs or rubs are appreciated.      Respiratory: Breath sounds are normal. No rales. No wheezing.    Abdomen: Soft, nontender, nondistended with positive bowel sounds.      Extremity: No tenderness. No  pitting edema     Neurologic: The patient is alert     Skin: No rash, no obvious lesions noted.      Psychiatric: The patient appears to be emotionally stable.      INTERPRETATION OF TELEMETRY: not on   ECG: Sinus rythm , RBBB    I&O's Detail    24 May 2021 07:01  -  25 May 2021 07:00  --------------------------------------------------------  IN:    IV PiggyBack: 400 mL    Lactated Ringers: 144 mL    Lactated Ringers Bolus: 1000 mL  Total IN: 1544 mL    OUT:    Indwelling Catheter - Urethral (mL): 416 mL  Total OUT: 416 mL    Total NET: 1128 mL          LABS:                  05-25 Chol 62 LDL -- HDL 41 Trig 60, 05-14 Chol 119 LDL -- HDL 47 Trig 96    LIVER FUNCTIONS - ( 28 May 2021 05:36 )  Alb: 2.5 g/dL / Pro: 6.5 gm/dL / ALK PHOS: 197 U/L / ALT: 118 U/L / AST: 67 U/L / GGT: x             05-25 Chol 62 LDL -- HDL 41 Trig 60, 05-14 Chol 119 LDL -- HDL 47 Trig 96      I&O's Summary    24 May 2021 07:01  -  25 May 2021 07:00  --------------------------------------------------------  IN: 1544 mL / OUT: 416 mL / NET: 1128 mL      BNP  RADIOLOGY & ADDITIONAL STUDIES:  ch< from: Xray Chest 1 View- PORTABLE-Urgent (05.24.21 @ 11:41) >  EXAM:  XR CHEST PORTABLE URGENT 1V                            PROCEDURE DATE:  05/24/2021          INTERPRETATION:  AP erect chest on May 24, 2021 at 11:38 AM. Patient has chest pain.    Heart is magnified by technique.    Presently there is a leftloop recorder new since March 7.    Lungs remain clear.    Bilateral shoulder degeneration noted.    IMPRESSION: Left loop recorder presently seen. No acute finding or change.            JEM PARHAM MD; Attending Radiologist  This document has been electronically signed. May 24 2021  3:31PM    < end of copied text >  < from: TTE Echo Complete w/o Contrast w/ Doppler (03.08.21 @ 08:32) >   Impression     Summary     Mild to Moderate mitral regurgitation is present.   Fibrocalcific changes noted to the mitral valve leaflets with preserved   leaflet excursion.   Mild mitral annular calcification is present.   Mild to Moderate mitral regurgitation is present.   EA reversal of the mitral inflow consistent with reduced compliance of the   left ventricle   There are fibrocalcific changes noted to the aortic valve leaflets with   restriction in leaflet excursion. Transaortic gradients are slightly   elevated but do not meet the criteria for aortic stenosis. No aortic   insufficiency noted.   Trace to mild tricuspid valve regurgitation is present.   Normal appearing pulmonic valve structure and function.   Normal appearing left atrium.   The left ventricle is normal in size, wall thickness, wall motion and   contractility.   Estimated left ventricular ejection fraction is 55-60 %.     Signature     ----------------------------------------------------------------   Electronically signed by Laney Clark MD(Interpreting   physician) on 03/08/2021 05:31 PM   ----------------------------------------------------------------    < end of copied text >  < from: TTE Echo Complete w/o Contrast w/ Doppler (03.08.21 @ 08:32) >   Impression     Summary     Mild to Moderate mitral regurgitation is present.   Fibrocalcific changes noted to the mitral valve leaflets with preserved   leaflet excursion.   Mild mitral annular calcification is present.   Mild to Moderate mitral regurgitation is present.   EA reversal of the mitral inflow consistent with reduced compliance of the   left ventricle   There are fibrocalcific changes noted to the aortic valve leaflets with   restriction in leaflet excursion. Transaortic gradients are slightly   elevated but do not meet the criteria for aortic stenosis. No aortic   insufficiency noted.   Trace to mild tricuspid valve regurgitation is present.   Normal appearing pulmonic valve structure and function.   Normal appearing left atrium.   The left ventricle is normal in size, wall thickness, wall motion and   contractility.   Estimated left ventricular ejection fraction is 55-60 %.     Signature     ----------------------------------------------------------------   Electronically signed by aLney Clark MD(Interpreting   physician) on 03/08/2021 05:31 PM   ----------------------------------------------------------------    < end of copied text >  r< from: MR Head No Cont (05.26.21 @ 14:23) >      IMPRESSION:    Numerous new areas of restricted diffusion in the leftfrontal, parietal, occipital, and temporal lobes, left basal ganglia, and right parietal lobe, suggesting acute infarcts. The left-sided infarcts have a watershed distribution. Areas of subacute infarct in the left parietal, occipital and temporal lobes are again seen. There are areas of susceptibility artifact associated with acute and subacute infarcts in the left parietal-occipital region, suggesting hemorrhagic transformation.    These findings were discussed with WIL Ferguson at 5/26/2021 3:05 PMby Dr. Africa Blackmon with read back confirmation.            AFRICA BLACKMON MD; Attending Radiologist  This document has been electronically signed. May 26 2021  3:10PM    < end of copied text >

## 2021-06-01 NOTE — PROGRESS NOTE ADULT - SUBJECTIVE AND OBJECTIVE BOX
CC:  CVA (29 May 2021 06:38)      Medical progress:   Complaints:  State of mind:     ROS:  all other review of systems are negative unless indicated above.    PE:   T(C): 36.6 (01 Jun 2021 07:20), Max: 37 (31 May 2021 23:24)  T(F): 97.8 (01 Jun 2021 07:20), Max: 98.6 (31 May 2021 23:24)  HR: 84 (01 Jun 2021 07:20) (84 - 92)  BP: 121/62 (01 Jun 2021 07:20) (107/50 - 122/46)  RR: 18 (01 Jun 2021 07:20) (18 - 18)  SpO2: 95% (01 Jun 2021 07:20) (93% - 95%)  Constitutional: NAD.   HEENT: PERRL, EOMI, MMM.  Neck: Soft and supple, No carotid bruit, No JVD  Respiratory: Breath sounds are clear bilaterally, No wheezing, rales or rhonchi  Cardiovascular: S1 and S2, regular rate and rhythm, no murmur, rub or gallop.  Gastrointestinal: Bowel Sounds present, soft, nontender, nondistended, no guarding, no rebound, no mass.  Extremities: No peripheral edema  Vascular: 2+ peripheral pulses  Neurological: A/O x , no focal deficits  Musculoskeletal: 5/5 strength b/l upper and lower extremities  Skin:  no visible rashes.     < from: CT Head No Cont (05.29.21 @ 14:23) >  IMPRESSION:   Small acute cortical infarctions again seen in in the LEFT frontal and parietal lobes. Acute/subacute white matter infarctions also seen in the LEFT centrum semiovale. Mild periventricular and deep white matter ischemia is noted with old lacunar infarctions in the BILATERAL basal ganglia.    < end of copied text >    < from: MR Head No Cont (05.26.21 @ 14:23) >    IMPRESSION:    Numerous new areas of restricted diffusion in the leftfrontal, parietal, occipital, and temporal lobes, left basal ganglia, and right parietal lobe, suggesting acute infarcts. The left-sided infarcts have a watershed distribution. Areas of subacute infarct in the left parietal, occipital and temporal lobes are again seen. There are areas of susceptibility artifact associated with acute and subacute infarcts in the left parietal-occipital region, suggesting hemorrhagic transformation.    < end of copied text >  < from: Xray Kidney Ureter Bladder (05.25.21 @ 12:43) >    IMPRESSION:  Feeding tube to stomach.    < end of copied text >  < from: US Abdomen Complete (US Abdomen Complete .) (05.25.21 @ 11:20) >  IMPRESSION:  Echogenic mass in the gallbladder compatible with tumefactive sludge    No evidence of acute cholecystitis.    < end of copied text >  < from: Xray Chest 1 View- PORTABLE-Routine (Xray Chest 1 View- PORTABLE-Routine in AM.) (05.25.21 @ 09:11) >  IMPRESSION:  Basilar congestion.    < end of copied text >  < from: CT Abdomen and Pelvis w/ IV Cont (05.24.21 @ 12:46) >  IMPRESSION:  Mildly distended gallbladder with mild gallbladder wall and common bile duct enhancement.    No gallbladder wall thickening or calcified gallstones; correlation is recommended with LFTs and a right upper quadrant ultrasound could be obtained for further evaluation.      < end of copied text >  < from: CT Angio Neck w/ IV Cont (05.24.21 @ 12:44) >  IMPRESSION:      1. The CT perfusion study suggests generalized hypoperfusion of both hemispheres as well as within the posterior fossa without a focal abnormality. No area of core infarction. See above.  2. Moderate calcified plaque right carotid bifurcation but without significant stenosis. Focal stenosis left internal carotid 1 cm above the bifurcation with no tandemlesion noted.  3. Patent vertebrals.  4. Widely patent vasculature noted intracranially. Mild scattered atherosclerotic changes. No occlusion or tight stenosis suggested.    < end of copied text >  < from: Xray Chest 1 View- PORTABLE-Urgent (05.24.21 @ 11:41) >  IMPRESSION: Left loop recorder presently seen. No acute finding or change.    < end of copied text >    < from: Transesophageal Echocardiogram (05.15.21 @ 13:36) >  Impression     Summary     Mild (1+) mitral regurgitation is present.   Trace tricuspid valve regurgitation is present.   Mild aortic sclerosis is present with normal valvular opening.   Normal appearing pulmonic valve structure and function.   The left atrium is mildly dilated.   The left ventricle is normal in size, wall thickness, wall motion and   contractility.   Normal appearing right ventricle structure and function.   Normal appearing right atrium.   No intracardiac masses, thrombi or vegetations were seen.   Mild atheroscleorsis of the thoracic aorta.   No PFO/ASD.       OFF SERVICE:    87M.  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  readmitted 05/24/2021.  presented to ED c/o vomiting.  in ED, acutely developed RUE weakness and aphasia.    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 Some may be related to watershed infarcts, but there is still a ? of embolic phenomenon. ILR interrogation was negative for a-fib. Possible repeat FABIO (FABIO several weeks ago negative for thrombus).  - DAPT / STATIN therapy  - F/U CT performed 5/29 did not show any evidence of hemorrhagic conversion  - Speech and swallow follow-u appreciated  - PT  - Neurology following    # Sepsis with Ecoli. GB sludge   - etiology of ecoli ? gb sludge/gut translocation  - previous FABIO 5/15 no vegetation or thrombus  - plan for repeat FABIO 6/1  - repeat blood cx 5/27, 5/28 no growth  - s/p cefepime #3 5/24-5/27  - on IV rocephin 2gm daily #6  - tolerating antibiotics well    # distended GB and CBD.  elevated LFTs.  - LFTs slowly trending down  - 05/25 US:  no evidence of acute cholecystitis.  - GI.    # DVT prophylaxis.  - UFH sq.

## 2021-06-01 NOTE — PROGRESS NOTE ADULT - SUBJECTIVE AND OBJECTIVE BOX
Date of service: 06-01-21 @ 12:15      Patient lying in bed; awake, alert, afebrile      ROS unable to obtain secondary to patient medical condition     MEDICATIONS  (STANDING):  artificial  tears Solution 1 Drop(s) Both EYES two times a day  aspirin  chewable 81 milliGRAM(s) Oral daily  atorvastatin 80 milliGRAM(s) Oral at bedtime  cefTRIAXone Injectable. 2000 milliGRAM(s) IV Push every 24 hours  chlorhexidine 4% Liquid 1 Application(s) Topical <User Schedule>  clopidogrel Tablet 75 milliGRAM(s) Oral daily  heparin   Injectable 5000 Unit(s) SubCutaneous every 8 hours  metoprolol succinate ER 25 milliGRAM(s) Oral daily    MEDICATIONS  (PRN):  acetaminophen   Tablet .. 650 milliGRAM(s) Oral every 8 hours PRN Mild Pain (1 - 3)      Vital Signs Last 24 Hrs  T(C): 36.6 (01 Jun 2021 07:20), Max: 37 (31 May 2021 23:24)  T(F): 97.8 (01 Jun 2021 07:20), Max: 98.6 (31 May 2021 23:24)  HR: 84 (01 Jun 2021 07:20) (84 - 92)  BP: 121/62 (01 Jun 2021 07:20) (107/50 - 122/46)  BP(mean): --  RR: 18 (01 Jun 2021 07:20) (18 - 18)  SpO2: 95% (01 Jun 2021 07:20) (93% - 95%)        Physical Exam:        PE:  Constitutional: frail looking, aphasic   HEENT: NC/AT, EOMI, PERRLA, conjunctivae clear; ears and nose atraumatic; pharynx benign  Neck: supple; thyroid not palpable  Back: no tenderness  Respiratory: respiratory effort normal; clear to auscultation  Cardiovascular: S1S2 regular, no murmurs  Abdomen: soft, not tender, not distended, positive BS; liver and spleen WNL  Genitourinary: no suprapubic tenderness  Musculoskeletal: no muscle tenderness, no joint swelling or tenderness  Extremities: no pedal edema  Neurological/ Psychiatric:  aphasic, R hemiparesis  Skin: no rashes; no palpable lesions      Labs:                 Cultures:       Culture - Blood (collected 05-28-21 @ 15:41)  Source: .Blood None  Preliminary Report (05-29-21 @ 23:01):    No growth to date.    Culture - Blood (collected 05-28-21 @ 15:41)  Source: .Blood None  Preliminary Report (05-29-21 @ 23:01):    No growth to date.    Culture - Blood (collected 05-27-21 @ 09:59)  Source: .Blood None  Preliminary Report (05-28-21 @ 16:00):    No growth to date.                  Radiology: all available radiological tests reviewed    < from: CT Head No Cont (05.29.21 @ 14:23) >  EXAM:  CT BRAIN                            PROCEDURE DATE:  05/29/2021          INTERPRETATION:  CT head without IV contrast    CLINICAL INFORMATION:  Follow-up   Intracranial hemorrhage.    TECHNIQUE: Contiguous axial 5 mm sections were obtained through the head. Sagittal and coronal 2-D reformatted images were also obtained.   This scan was performed using automatic exposure control (radiation dose reduction software) to obtain a diagnostic image quality scan with patient dose as low as reasonably achievable.    FINDINGS:   MRI dated 05/26/2021 available for review.    The brain demonstrates small acute cortical infarctions again seen in in the LEFT frontal and parietal lobes. Acute/subacute white matter infarctions also seen in the LEFT centrum semiovale. Mild periventricular and deep white matter ischemia is noted with old lacunar infarctions in the BILATERAL basal ganglia. No intracranial hemorrhage is found.  No mass effect is found in the brain.    The ventricles, sulci and basalcisterns appear unremarkable.    The orbits are unremarkable.  The paranasal sinuses are clear.  The nasal cavity appears intact.  The nasopharynx is symmetric.  The central skull base, petrous temporal bones and calvarium remain intact.      IMPRESSION:   Small acute cortical infarctions again seen in in the LEFT frontal and parietal lobes. Acute/subacute white matter infarctions also seen in the LEFT centrum semiovale. Mild periventricular and deep white matter ischemia is noted with old lacunar infarctions in the BILATERAL basal ganglia.    < end of copied text >  < from: US Abdomen Complete (US Abdomen Complete .) (05.25.21 @ 11:20) >    EXAM:  US ABDOMEN COMPLETE                            PROCEDURE DATE:  05/25/2021          INTERPRETATION:  CLINICAL INFORMATION: Evaluate right upper quadrant and gallbladder.    COMPARISON: CT abdomen May 24, 2020    TECHNIQUE: Sonography of the abdomen.    FINDINGS:    Liver: Mildly increased hepatic echogenicity. Smooth contours. No focal lesion.  Bile ducts: Normal caliber. Common bile duct measures 4 mm.  Gallbladder: Rounded echogenic nodule measuring 3.2 x 2.7 cm without shadowing or flow. No wall thickening or pericholecystic fluid  Pancreas: Visualized portions are within normal limits.  Spleen: 11.91. Within normal limits.  Right kidney: 14.1 cm. No hydronephrosis.  Left kidney: 11.6 cm.  No hydronephrosis. 3.7 cm left renal cyst.  Ascites: None.  Aorta and IVC: Visualized portions are within normal limits.    IMPRESSION:  Echogenic mass in the gallbladder compatible with tumefactive sludge    No evidence of acute cholecystitis.        < end of copied text >  < from: CT Abdomen and Pelvis w/ IV Cont (05.24.21 @ 12:46) >  EXAM:  CT ABDOMEN AND PELVIS IC                            PROCEDURE DATE:  05/24/2021          INTERPRETATION:  CLINICAL INFORMATION: Diffuse abdominal pain.    COMPARISON: CT abdomen/pelvis 5/23/2020    CONTRAST/COMPLICATIONS:  IV Contrast: Administered at the time of CTA head and neck of the same day.  Oral Contrast: NONE  Complications: None reported at time of study completion    PROCEDURE:  CT of the Abdomen and Pelvis was performed.  Sagittal and coronal reformats were performed.    FINDINGS:  LOWER CHEST: 1.3 cm cyst in the right lower lobe. Linear atelectasis or scarring at the lung bases. Small hiatal hernia. Coronary artery calcifications.    LIVER: Within normal limits.  BILE DUCTS: Normal caliber. Mild enhancement of the commonbile duct  GALLBLADDER: Distended. Mild wall enhancement. No wall thickening or calcified gallstones.  SPLEEN: Within normal limits.  PANCREAS: Fatty infiltration of the pancreatic head.  ADRENALS: Unremarkable.  KIDNEYS/URETERS: Excreted intravenouscontrast in both renal collecting systems. No hydronephrosis. 2.2 cm left renal cyst.    BLADDER: Mild wall thickening. Small bladder diverticulum arising from the right bladder wall.  REPRODUCTIVE ORGANS: Median lobe hypertrophy of the prostate withintravesicular protrusion.    BOWEL: Colonic diverticulosis without evidence of diverticulitis. No bowel obstruction. Nonspecific mild specific haziness of the fat in the small bowel mesentery with mildly prominent mesenteric lymph nodes, unchanged since 5/23/2020.  PERITONEUM: No ascites. Small ovoid partially calcified structure at the anterior aspect of the pelvis (series 2 image 101), possibly a lymph node. Clips again seen in the right lower quadrant.  VESSELS: Ectasia of the intrarenal abdominal aorta measuring 2.6 x 2.5 cm, unchanged. Atherosclerotic changes.  RETROPERITONEUM/LYMPH NODES: No lymphadenopathy.  ABDOMINAL WALL: There appears to be a hernia plug in the right inguinal region.  BONES: Chronic compression deformity of the superior endplate of T11. Stable sclerotic lesion in L5.    IMPRESSION:  Mildly distended gallbladder with mild gallbladder wall and common bile duct enhancement.    No gallbladder wall thickening or calcified gallstones; correlation is recommended with LFTs and a right upper quadrant ultrasound could be obtained for further evaluation.        EXAM:  MR BRAIN                            PROCEDURE DATE:  05/26/2021          INTERPRETATION:  CLINICAL INFORMATION: recurrent stroke    recurrent right sided weaknes. recurrent stroke    TECHNIQUE: Multiplanar, multisequence brain MRI was performed without intravenous contrast    COMPARISON: CT head 5/25/2021.  MRI brain 5/14/2021..    FINDINGS:    Numerous new areas of restricted diffusion in the left frontal, parietal, occipital, and temporal lobes, left basal ganglia, and right parietal lobe, suggesting acute infarcts. The left-sided infarcts have a watershed distribution. Areas of subacute infarct in the left parietal, occipital and temporal lobes are again seen. There are areas of susceptibility artifact associated with acute and subacute infarcts in the left parietal-occipital region, suggesting hemorrhagic transformation.    Chronic lacunar infarcts in the left basal ganglia, left cerebellum and right centrum semiovale. Chronic right parietal infarct. Scattered foci of T2/FLAIR hyperintensity in the bilateral hemispheric white matter are nonspecific but likely related to chronic white matter microvascular ischemic disease. There is cerebral volume loss.    Flow voids of the major intracranial vessels at the skull base follow expected course and contour.    Mild bilateral ethmoid sinus mucosal thickening. The mastoids demonstrate no signal abnormality.  Status post right intraocular lens implant.      IMPRESSION:    Numerous new areas of restricted diffusion in the leftfrontal, parietal, occipital, and temporal lobes, left basal ganglia, and right parietal lobe, suggesting acute infarcts. The left-sided infarcts have a watershed distribution. Areas of subacute infarct in the left parietal, occipital and temporal lobes are again seen. There are areas of susceptibility artifact associated with acute and subacute infarcts in the left parietal-occipital region, suggesting hemorrhagic transformation.    These findings were discussed with WIL Ferguson at 5/26/2021 3:05 PMby Dr. Franko Blackmon with read back confirmation.        Advanced directives addressed: full resuscitation

## 2021-06-01 NOTE — PROGRESS NOTE ADULT - REASON FOR ADMISSION
CVA
CVA/ Rt side weakness
CVA/ Sepsis
CVA/ Sepsis
fever , right sided weakness
CVA/ Rt side weakness/ Sepsis
elevated lfts
CVA
nausea, vomitting, acute stroke
CVA

## 2021-06-01 NOTE — PROGRESS NOTE ADULT - SUBJECTIVE AND OBJECTIVE BOX
Interval History:  6/1/21: Being fed purees by physical therapist.  More awake    MEDICATIONS  (STANDING):  artificial  tears Solution 1 Drop(s) Both EYES two times a day  aspirin  chewable 81 milliGRAM(s) Oral daily  atorvastatin 80 milliGRAM(s) Oral at bedtime  cefTRIAXone Injectable. 2000 milliGRAM(s) IV Push every 24 hours  chlorhexidine 4% Liquid 1 Application(s) Topical <User Schedule>  clopidogrel Tablet 75 milliGRAM(s) Oral daily  heparin   Injectable 5000 Unit(s) SubCutaneous every 8 hours  metoprolol succinate ER 25 milliGRAM(s) Oral daily    MEDICATIONS  (PRN):  acetaminophen   Tablet .. 650 milliGRAM(s) Oral every 8 hours PRN Mild Pain (1 - 3)      Allergies    penicillin (Unknown)    Intolerances        PHYSICAL EXAM:  Vital Signs Last 24 Hrs  T(F): 97.8 (06-01-21 @ 07:20)  HR: 84 (06-01-21 @ 07:20)  BP: 121/62 (06-01-21 @ 07:20)  RR: 18 (06-01-21 @ 07:20)    GENERAL: NAD, well-groomed, well-developed  HEAD:  Atraumatic, Normocephalic  Neuro:  Awake, alert  Able to name some objects - "thumb", "glasses"  CN: R facial droop, + dysarthria  motor: right sided weakness  sensory: intact to light touch  coordination: impaired on right    LABS:                RADIOLOGY & ADDITIONAL STUDIES:    CT head 5/24/21:  1)  chronic ischemic changes in both hemispheres with volume loss and involutional change. No acute infarct, hemorrhage, or space occupying lesion suggested.  2)  follow-up MR imaging may be considered for further assessment.    CTA head/CTA neck/CT perfusion 5/24/21:  1. The CT perfusion study suggests generalized hypoperfusion of both hemispheres as well as within the posterior fossa without a focal abnormality. No area of core infarction. See above.  2. Moderate calcified plaque right carotid bifurcation but without significant stenosis. Focal stenosis left internal carotid 1 cm above the bifurcation with no tandemlesion noted.  3. Patent vertebrals.  4. Widely patent vasculature noted intracranially. Mild scattered atherosclerotic changes. No occlusion or tight stenosis suggested.    CT head 5/25/21:  Area of hypodensity in the left parietal occipital region suggesting acute/subacute infarct. No evidence of hemorrhagic transformation.    MR head 5/26/21:  Numerous new areas of restricted diffusion in the left frontal, parietal, occipital, and temporal lobes, left basal ganglia, and right parietal lobe, suggesting acute infarcts. The left-sided infarcts have a watershed distribution. Areas of subacute infarct in the left parietal, occipital and temporal lobes are again seen. There are areas of susceptibility artifact associated with acute and subacute infarcts in the left parietal-occipital region, suggesting hemorrhagic transformation.    eeg 5/25/21:  1.This is an abnormal EEG due to the presence of mild generalized background slowing may be on the basis of metabolic causes.  2. Marked left frontal and temporal slowing indicative of underlying structural pathology. Clinical correlation recommended.  3. No epileptiform activity noted. Clinical correlation recommended.    24 Hour eeg 5/26/21-5/27/21:  This is an abnormal prolonged video EEG due to the presence of   Mild generalized slowing with superimposed moderate to severe slowing on the left side indicative of underlying structural pathology. Clinical correlation recommended.  No epileptiform activity noted. Dr. Martínez informed of the results.      CT head 5/29/21:  Small acute cortical infarctions again seen in in the LEFT frontal and parietal lobes. Acute/subacute white matter infarctions also seen in the LEFT centrum semiovale. Mild periventricular and deep white matter ischemia is noted with old lacunar infarctions in the BILATERAL basal ganglia.

## 2021-06-01 NOTE — PROGRESS NOTE ADULT - ASSESSMENT
87 y.o. 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 US abd remarkable for GB sludge, UA/urine cx unremarkable, was given IV cefepime 5/24-5/27, now on IV rocephin since 5/28, ? etiology - gb sludge/gut translocation? had previous FABIO 5/15 no vegetation or thrombus and repeat blood cx 5/27, 5/28 no growth so far.     1. Sepsis with Ecoli. GB sludge. Acute L embolic CVA  - imaging reviewed, neurology follow up noted, etiology of cva unclear  - etiology of ecoli ? gb sludge/gut translocation; urine unremarkable  - previous FABIO 5/15 no vegetation or thrombus  - plan for repeat FABIO 6/1  - repeat blood cx 5/27, 5/28 no growth  - s/p cefepime #3 5/24-5/27  - on IV rocephin 2gm daily #6  - continue with antibiotic coverage  - fu cbc  - tolerating abx well so far; no side effects noted  - reason for abx use and side effects reviewed with patient  - supportive care    2. other issues - care per medicine

## 2021-06-01 NOTE — PROGRESS NOTE ADULT - ASSESSMENT
87 YO M with a PMH of RA, Diverticulitis, CVA in 3/2021 and CVA 5/14. In ER acutely developed RUE weakness and aphasia. Neutropenic with elevated lactic acid .  Also found ot have bacteremia.    # Acute Left CVA with Rt Hemiparesis   -Patient was not a  TPA candidate due to Recent CVA and Pt's medical condition with Hypoxia and Sepsis-NO Intervention as no LVO  -Follow up MRI showed numerous areas of restricted diffusion. Some may be related to watershed infarcts, but there is still a ? of embolic phenomenon. ILR interrogation was negative for a-fib.   -Awaiting report of repeat FABIO  -Continue Aspirin +  Plavix at this time.  -Continue Statin  -F/U CT performed 5/29 did not show any evidence of hemorrhagic conversion  -Speech and swallow therapy continued. He has had some improvement in aphasia.  -PT  -Cardiology follow up appreciated      # Sepsis   -Follow up with ID/ Antibiotics      Will follow up as needed

## 2021-06-01 NOTE — PROGRESS NOTE ADULT - ASSESSMENT
Recurrent CVA-   Loop recorder interrogation did not reveal any afib.  Repeat FABIO today per ID team recommendations  Keep pt NPO.   D/W RN and cath lab team and Dr Foster ( about anesthesia availability to accommodate this case this am).     Hyperlipidemia- continue statin.    HTN- BP optimal.    Sepsis- Management per primary team.   E coli sepsis. ID team input appreciated.          Other medical issues- Management per primary team.   Thank you for allowing me to participate in the care of this patient. Please feel free to contact me with any questions.

## 2021-06-02 LAB
ALBUMIN SERPL ELPH-MCNC: 2.7 G/DL — LOW (ref 3.3–5)
ALP SERPL-CCNC: 144 U/L — HIGH (ref 40–120)
ALT FLD-CCNC: 103 U/L — HIGH (ref 12–78)
ANION GAP SERPL CALC-SCNC: 9 MMOL/L — SIGNIFICANT CHANGE UP (ref 5–17)
AST SERPL-CCNC: 79 U/L — HIGH (ref 15–37)
BILIRUB SERPL-MCNC: 0.9 MG/DL — SIGNIFICANT CHANGE UP (ref 0.2–1.2)
BUN SERPL-MCNC: 32 MG/DL — HIGH (ref 7–23)
CALCIUM SERPL-MCNC: 9 MG/DL — SIGNIFICANT CHANGE UP (ref 8.5–10.1)
CARDIOLIPIN AB SER-ACNC: POSITIVE
CHLORIDE SERPL-SCNC: 107 MMOL/L — SIGNIFICANT CHANGE UP (ref 96–108)
CO2 SERPL-SCNC: 19 MMOL/L — LOW (ref 22–31)
CREAT SERPL-MCNC: 0.85 MG/DL — SIGNIFICANT CHANGE UP (ref 0.5–1.3)
CULTURE RESULTS: SIGNIFICANT CHANGE UP
CULTURE RESULTS: SIGNIFICANT CHANGE UP
GLUCOSE SERPL-MCNC: 126 MG/DL — HIGH (ref 70–99)
HCT VFR BLD CALC: 45.7 % — SIGNIFICANT CHANGE UP (ref 39–50)
HGB BLD-MCNC: 15 G/DL — SIGNIFICANT CHANGE UP (ref 13–17)
MCHC RBC-ENTMCNC: 30.9 PG — SIGNIFICANT CHANGE UP (ref 27–34)
MCHC RBC-ENTMCNC: 32.8 GM/DL — SIGNIFICANT CHANGE UP (ref 32–36)
MCV RBC AUTO: 94 FL — SIGNIFICANT CHANGE UP (ref 80–100)
PLATELET # BLD AUTO: 183 K/UL — SIGNIFICANT CHANGE UP (ref 150–400)
POTASSIUM SERPL-MCNC: 4.8 MMOL/L — SIGNIFICANT CHANGE UP (ref 3.5–5.3)
POTASSIUM SERPL-SCNC: 4.8 MMOL/L — SIGNIFICANT CHANGE UP (ref 3.5–5.3)
PROT SERPL-MCNC: 7.1 GM/DL — SIGNIFICANT CHANGE UP (ref 6–8.3)
RBC # BLD: 4.86 M/UL — SIGNIFICANT CHANGE UP (ref 4.2–5.8)
RBC # FLD: 13.5 % — SIGNIFICANT CHANGE UP (ref 10.3–14.5)
SODIUM SERPL-SCNC: 135 MMOL/L — SIGNIFICANT CHANGE UP (ref 135–145)
SPECIMEN SOURCE: SIGNIFICANT CHANGE UP
SPECIMEN SOURCE: SIGNIFICANT CHANGE UP
WBC # BLD: 11.16 K/UL — HIGH (ref 3.8–10.5)
WBC # FLD AUTO: 11.16 K/UL — HIGH (ref 3.8–10.5)

## 2021-06-02 PROCEDURE — 99232 SBSQ HOSP IP/OBS MODERATE 35: CPT

## 2021-06-02 RX ORDER — CEFUROXIME AXETIL 250 MG
250 TABLET ORAL EVERY 12 HOURS
Refills: 0 | Status: DISCONTINUED | OUTPATIENT
Start: 2021-06-02 | End: 2021-06-04

## 2021-06-02 RX ORDER — MIDAZOLAM HYDROCHLORIDE 1 MG/ML
1 INJECTION, SOLUTION INTRAMUSCULAR; INTRAVENOUS ONCE
Refills: 0 | Status: DISCONTINUED | OUTPATIENT
Start: 2021-06-02 | End: 2021-06-02

## 2021-06-02 RX ORDER — MORPHINE SULFATE 50 MG/1
1 CAPSULE, EXTENDED RELEASE ORAL ONCE
Refills: 0 | Status: DISCONTINUED | OUTPATIENT
Start: 2021-06-02 | End: 2021-06-02

## 2021-06-02 RX ADMIN — CHLORHEXIDINE GLUCONATE 1 APPLICATION(S): 213 SOLUTION TOPICAL at 05:40

## 2021-06-02 RX ADMIN — Medication 250 MILLIGRAM(S): at 21:16

## 2021-06-02 RX ADMIN — CLOPIDOGREL BISULFATE 75 MILLIGRAM(S): 75 TABLET, FILM COATED ORAL at 09:18

## 2021-06-02 RX ADMIN — Medication 1 DROP(S): at 09:18

## 2021-06-02 RX ADMIN — HEPARIN SODIUM 5000 UNIT(S): 5000 INJECTION INTRAVENOUS; SUBCUTANEOUS at 05:40

## 2021-06-02 RX ADMIN — MORPHINE SULFATE 1 MILLIGRAM(S): 50 CAPSULE, EXTENDED RELEASE ORAL at 16:57

## 2021-06-02 RX ADMIN — Medication 81 MILLIGRAM(S): at 09:18

## 2021-06-02 RX ADMIN — MIDAZOLAM HYDROCHLORIDE 1 MILLIGRAM(S): 1 INJECTION, SOLUTION INTRAMUSCULAR; INTRAVENOUS at 16:20

## 2021-06-02 RX ADMIN — MORPHINE SULFATE 1 MILLIGRAM(S): 50 CAPSULE, EXTENDED RELEASE ORAL at 16:20

## 2021-06-02 RX ADMIN — CEFTRIAXONE 2000 MILLIGRAM(S): 500 INJECTION, POWDER, FOR SOLUTION INTRAMUSCULAR; INTRAVENOUS at 09:17

## 2021-06-02 RX ADMIN — ATORVASTATIN CALCIUM 80 MILLIGRAM(S): 80 TABLET, FILM COATED ORAL at 21:17

## 2021-06-02 RX ADMIN — HEPARIN SODIUM 5000 UNIT(S): 5000 INJECTION INTRAVENOUS; SUBCUTANEOUS at 21:17

## 2021-06-02 RX ADMIN — Medication 1 DROP(S): at 21:16

## 2021-06-02 RX ADMIN — HEPARIN SODIUM 5000 UNIT(S): 5000 INJECTION INTRAVENOUS; SUBCUTANEOUS at 18:12

## 2021-06-02 RX ADMIN — Medication 25 MILLIGRAM(S): at 09:18

## 2021-06-02 NOTE — PROGRESS NOTE ADULT - SUBJECTIVE AND OBJECTIVE BOX
Interval History:  6/2/21: No new complaints    MEDICATIONS  (STANDING):  artificial  tears Solution 1 Drop(s) Both EYES two times a day  aspirin  chewable 81 milliGRAM(s) Oral daily  atorvastatin 80 milliGRAM(s) Oral at bedtime  cefTRIAXone Injectable. 2000 milliGRAM(s) IV Push every 24 hours  chlorhexidine 4% Liquid 1 Application(s) Topical <User Schedule>  clopidogrel Tablet 75 milliGRAM(s) Oral daily  heparin   Injectable 5000 Unit(s) SubCutaneous every 8 hours  metoprolol succinate ER 25 milliGRAM(s) Oral daily    MEDICATIONS  (PRN):  acetaminophen   Tablet .. 650 milliGRAM(s) Oral every 8 hours PRN Mild Pain (1 - 3)      Allergies    penicillin (Unknown)    Intolerances        PHYSICAL EXAM:  Vital Signs Last 24 Hrs  T(F): 98.5 (06-02-21 @ 08:08)  HR: 83 (06-02-21 @ 08:08)  BP: 135/65 (06-02-21 @ 08:08)  RR: 18 (06-02-21 @ 08:08)    GENERAL: NAD, well-groomed, well-developed  HEAD:  Atraumatic, Normocephalic  Neuro:  Awake, alert  Able to name some objects -" pen" but not "glasses". When asked how he is feeling he says, "not bad"  CN: R facial droop, + dysarthria  motor: right sided weakness  sensory: intact to light touch  coordination: impaired on right      LABS:                        15.0   11.16 )-----------( 183      ( 02 Jun 2021 07:30 )             45.7     06-02    135  |  107  |  32<H>  ----------------------------<  126<H>  4.8   |  19<L>  |  0.85    Ca    9.0      02 Jun 2021 07:30    TPro  7.1  /  Alb  2.7<L>  /  TBili  0.9  /  DBili  x   /  AST  79<H>  /  ALT  103<H>  /  AlkPhos  144<H>  06-02          RADIOLOGY & ADDITIONAL STUDIES:      CT head 5/24/21:  1)  chronic ischemic changes in both hemispheres with volume loss and involutional change. No acute infarct, hemorrhage, or space occupying lesion suggested.  2)  follow-up MR imaging may be considered for further assessment.    CTA head/CTA neck/CT perfusion 5/24/21:  1. The CT perfusion study suggests generalized hypoperfusion of both hemispheres as well as within the posterior fossa without a focal abnormality. No area of core infarction. See above.  2. Moderate calcified plaque right carotid bifurcation but without significant stenosis. Focal stenosis left internal carotid 1 cm above the bifurcation with no tandem lesion noted.  3. Patent vertebrals.  4. Widely patent vasculature noted intracranially. Mild scattered atherosclerotic changes. No occlusion or tight stenosis suggested.    CT head 5/25/21:  Area of hypodensity in the left parietal occipital region suggesting acute/subacute infarct. No evidence of hemorrhagic transformation.    MR head 5/26/21:  Numerous new areas of restricted diffusion in the left frontal, parietal, occipital, and temporal lobes, left basal ganglia, and right parietal lobe, suggesting acute infarcts. The left-sided infarcts have a watershed distribution. Areas of subacute infarct in the left parietal, occipital and temporal lobes are again seen. There are areas of susceptibility artifact associated with acute and subacute infarcts in the left parietal-occipital region, suggesting hemorrhagic transformation.    eeg 5/25/21:  1.This is an abnormal EEG due to the presence of mild generalized background slowing may be on the basis of metabolic causes.  2. Marked left frontal and temporal slowing indicative of underlying structural pathology. Clinical correlation recommended.  3. No epileptiform activity noted. Clinical correlation recommended.    24 Hour eeg 5/26/21-5/27/21:  This is an abnormal prolonged video EEG due to the presence of   Mild generalized slowing with superimposed moderate to severe slowing on the left side indicative of underlying structural pathology. Clinical correlation recommended.  No epileptiform activity noted. Dr. Martínez informed of the results.      CT head 5/29/21:  Small acute cortical infarctions again seen in in the LEFT frontal and parietal lobes. Acute/subacute white matter infarctions also seen in the LEFT centrum semiovale. Mild periventricular and deep white matter ischemia is noted with old lacunar infarctions in the BILATERAL basal ganglia.

## 2021-06-02 NOTE — PACU DISCHARGE NOTE - COMMENTS
Verbal report given to Stephany on [address].  Patient is fully awake and breathing normally.  VSS as charted.  Awaiting transport back to [address] [place] [address].

## 2021-06-02 NOTE — PROGRESS NOTE ADULT - SUBJECTIVE AND OBJECTIVE BOX
Date of service: 06-02-21 @ 15:56    Patient confused and sitting in chair; afebrile        ROS: unable to obtain secondary to patient medical condition     MEDICATIONS  (STANDING):  artificial  tears Solution 1 Drop(s) Both EYES two times a day  aspirin  chewable 81 milliGRAM(s) Oral daily  atorvastatin 80 milliGRAM(s) Oral at bedtime  cefTRIAXone Injectable. 2000 milliGRAM(s) IV Push every 24 hours  chlorhexidine 4% Liquid 1 Application(s) Topical <User Schedule>  clopidogrel Tablet 75 milliGRAM(s) Oral daily  heparin   Injectable 5000 Unit(s) SubCutaneous every 8 hours  metoprolol succinate ER 25 milliGRAM(s) Oral daily    MEDICATIONS  (PRN):  acetaminophen   Tablet .. 650 milliGRAM(s) Oral every 8 hours PRN Mild Pain (1 - 3)      Vital Signs Last 24 Hrs  T(C): 36.2 (02 Jun 2021 15:00), Max: 37.1 (01 Jun 2021 23:19)  T(F): 97.1 (02 Jun 2021 15:00), Max: 98.7 (01 Jun 2021 23:19)  HR: 92 (02 Jun 2021 15:00) (83 - 92)  BP: 132/62 (02 Jun 2021 15:00) (132/61 - 135/65)  BP(mean): --  RR: 16 (02 Jun 2021 15:00) (16 - 18)  SpO2: 95% (02 Jun 2021 15:00) (95% - 97%)        Physical Exam:        PE:  Constitutional: frail looking, aphasic   HEENT: NC/AT, EOMI, PERRLA, conjunctivae clear; ears and nose atraumatic; pharynx benign  Neck: supple; thyroid not palpable  Back: no tenderness  Respiratory: respiratory effort normal; clear to auscultation  Cardiovascular: S1S2 regular, no murmurs  Abdomen: soft, not tender, not distended, positive BS; liver and spleen WNL  Genitourinary: no suprapubic tenderness  Musculoskeletal: no muscle tenderness, no joint swelling or tenderness  Extremities: no pedal edema  Neurological/ Psychiatric:  aphasic, R hemiparesis  Skin: no rashes; no palpable lesions      Labs:                        15.0   11.16 )-----------( 183      ( 02 Jun 2021 07:30 )             45.7     06-02    135  |  107  |  32<H>  ----------------------------<  126<H>  4.8   |  19<L>  |  0.85    Ca    9.0      02 Jun 2021 07:30    TPro  7.1  /  Alb  2.7<L>  /  TBili  0.9  /  DBili  x   /  AST  79<H>  /  ALT  103<H>  /  AlkPhos  144<H>  06-02           Cultures:       Culture - Blood (collected 05-28-21 @ 15:41)  Source: .Blood None  Preliminary Report (05-29-21 @ 23:01):    No growth to date.    Culture - Blood (collected 05-28-21 @ 15:41)  Source: .Blood None  Preliminary Report (05-29-21 @ 23:01):    No growth to date.    Culture - Blood (collected 05-27-21 @ 09:59)  Source: .Blood None  Final Report (06-01-21 @ 16:01):    No Growth Final                  Radiology: all available radiological tests reviewed    < from: CT Head No Cont (05.29.21 @ 14:23) >  EXAM:  CT BRAIN                            PROCEDURE DATE:  05/29/2021          INTERPRETATION:  CT head without IV contrast    CLINICAL INFORMATION:  Follow-up   Intracranial hemorrhage.    TECHNIQUE: Contiguous axial 5 mm sections were obtained through the head. Sagittal and coronal 2-D reformatted images were also obtained.   This scan was performed using automatic exposure control (radiation dose reduction software) to obtain a diagnostic image quality scan with patient dose as low as reasonably achievable.    FINDINGS:   MRI dated 05/26/2021 available for review.    The brain demonstrates small acute cortical infarctions again seen in in the LEFT frontal and parietal lobes. Acute/subacute white matter infarctions also seen in the LEFT centrum semiovale. Mild periventricular and deep white matter ischemia is noted with old lacunar infarctions in the BILATERAL basal ganglia. No intracranial hemorrhage is found.  No mass effect is found in the brain.    The ventricles, sulci and basalcisterns appear unremarkable.    The orbits are unremarkable.  The paranasal sinuses are clear.  The nasal cavity appears intact.  The nasopharynx is symmetric.  The central skull base, petrous temporal bones and calvarium remain intact.      IMPRESSION:   Small acute cortical infarctions again seen in in the LEFT frontal and parietal lobes. Acute/subacute white matter infarctions also seen in the LEFT centrum semiovale. Mild periventricular and deep white matter ischemia is noted with old lacunar infarctions in the BILATERAL basal ganglia.    < end of copied text >  < from: US Abdomen Complete (US Abdomen Complete .) (05.25.21 @ 11:20) >    EXAM:  US ABDOMEN COMPLETE                            PROCEDURE DATE:  05/25/2021          INTERPRETATION:  CLINICAL INFORMATION: Evaluate right upper quadrant and gallbladder.    COMPARISON: CT abdomen May 24, 2020    TECHNIQUE: Sonography of the abdomen.    FINDINGS:    Liver: Mildly increased hepatic echogenicity. Smooth contours. No focal lesion.  Bile ducts: Normal caliber. Common bile duct measures 4 mm.  Gallbladder: Rounded echogenic nodule measuring 3.2 x 2.7 cm without shadowing or flow. No wall thickening or pericholecystic fluid  Pancreas: Visualized portions are within normal limits.  Spleen: 11.91. Within normal limits.  Right kidney: 14.1 cm. No hydronephrosis.  Left kidney: 11.6 cm.  No hydronephrosis. 3.7 cm left renal cyst.  Ascites: None.  Aorta and IVC: Visualized portions are within normal limits.    IMPRESSION:  Echogenic mass in the gallbladder compatible with tumefactive sludge    No evidence of acute cholecystitis.        < end of copied text >  < from: CT Abdomen and Pelvis w/ IV Cont (05.24.21 @ 12:46) >  EXAM:  CT ABDOMEN AND PELVIS IC                            PROCEDURE DATE:  05/24/2021          INTERPRETATION:  CLINICAL INFORMATION: Diffuse abdominal pain.    COMPARISON: CT abdomen/pelvis 5/23/2020    CONTRAST/COMPLICATIONS:  IV Contrast: Administered at the time of CTA head and neck of the same day.  Oral Contrast: NONE  Complications: None reported at time of study completion    PROCEDURE:  CT of the Abdomen and Pelvis was performed.  Sagittal and coronal reformats were performed.    FINDINGS:  LOWER CHEST: 1.3 cm cyst in the right lower lobe. Linear atelectasis or scarring at the lung bases. Small hiatal hernia. Coronary artery calcifications.    LIVER: Within normal limits.  BILE DUCTS: Normal caliber. Mild enhancement of the commonbile duct  GALLBLADDER: Distended. Mild wall enhancement. No wall thickening or calcified gallstones.  SPLEEN: Within normal limits.  PANCREAS: Fatty infiltration of the pancreatic head.  ADRENALS: Unremarkable.  KIDNEYS/URETERS: Excreted intravenouscontrast in both renal collecting systems. No hydronephrosis. 2.2 cm left renal cyst.    BLADDER: Mild wall thickening. Small bladder diverticulum arising from the right bladder wall.  REPRODUCTIVE ORGANS: Median lobe hypertrophy of the prostate withintravesicular protrusion.    BOWEL: Colonic diverticulosis without evidence of diverticulitis. No bowel obstruction. Nonspecific mild specific haziness of the fat in the small bowel mesentery with mildly prominent mesenteric lymph nodes, unchanged since 5/23/2020.  PERITONEUM: No ascites. Small ovoid partially calcified structure at the anterior aspect of the pelvis (series 2 image 101), possibly a lymph node. Clips again seen in the right lower quadrant.  VESSELS: Ectasia of the intrarenal abdominal aorta measuring 2.6 x 2.5 cm, unchanged. Atherosclerotic changes.  RETROPERITONEUM/LYMPH NODES: No lymphadenopathy.  ABDOMINAL WALL: There appears to be a hernia plug in the right inguinal region.  BONES: Chronic compression deformity of the superior endplate of T11. Stable sclerotic lesion in L5.    IMPRESSION:  Mildly distended gallbladder with mild gallbladder wall and common bile duct enhancement.    No gallbladder wall thickening or calcified gallstones; correlation is recommended with LFTs and a right upper quadrant ultrasound could be obtained for further evaluation.        EXAM:  MR BRAIN                            PROCEDURE DATE:  05/26/2021          INTERPRETATION:  CLINICAL INFORMATION: recurrent stroke    recurrent right sided weaknes. recurrent stroke    TECHNIQUE: Multiplanar, multisequence brain MRI was performed without intravenous contrast    COMPARISON: CT head 5/25/2021.  MRI brain 5/14/2021..    FINDINGS:    Numerous new areas of restricted diffusion in the left frontal, parietal, occipital, and temporal lobes, left basal ganglia, and right parietal lobe, suggesting acute infarcts. The left-sided infarcts have a watershed distribution. Areas of subacute infarct in the left parietal, occipital and temporal lobes are again seen. There are areas of susceptibility artifact associated with acute and subacute infarcts in the left parietal-occipital region, suggesting hemorrhagic transformation.    Chronic lacunar infarcts in the left basal ganglia, left cerebellum and right centrum semiovale. Chronic right parietal infarct. Scattered foci of T2/FLAIR hyperintensity in the bilateral hemispheric white matter are nonspecific but likely related to chronic white matter microvascular ischemic disease. There is cerebral volume loss.    Flow voids of the major intracranial vessels at the skull base follow expected course and contour.    Mild bilateral ethmoid sinus mucosal thickening. The mastoids demonstrate no signal abnormality.  Status post right intraocular lens implant.      IMPRESSION:    Numerous new areas of restricted diffusion in the leftfrontal, parietal, occipital, and temporal lobes, left basal ganglia, and right parietal lobe, suggesting acute infarcts. The left-sided infarcts have a watershed distribution. Areas of subacute infarct in the left parietal, occipital and temporal lobes are again seen. There are areas of susceptibility artifact associated with acute and subacute infarcts in the left parietal-occipital region, suggesting hemorrhagic transformation.    These findings were discussed with WIL Ferguson at 5/26/2021 3:05 PMby Dr. Franko Blackmon with read back confirmation.        Advanced directives addressed: full resuscitation

## 2021-06-02 NOTE — PROGRESS NOTE ADULT - ASSESSMENT
87 YO M with a PMH of RA, Diverticulitis, CVA in 3/2021 and CVA 5/14. In ER acutely developed RUE weakness and aphasia. Neutropenic with elevated lactic acid .  Also found ot have bacteremia.    # Acute Left CVA with Rt Hemiparesis   -Patient was not a  TPA candidate due to Recent CVA and Pt's medical condition with Hypoxia and Sepsis-NO Intervention as no LVO  -Follow up MRI showed numerous areas of restricted diffusion. Some may be related to watershed infarcts, but there is still a ? of embolic phenomenon. ILR interrogation was negative for a-fib.   -Awaiting repeat FABIO which is scheduled for 6/3.  -Continue Aspirin +  Plavix at this time.  -Continue Statin  -F/U CT performed 5/29 did not show any evidence of hemorrhagic conversion  -Speech and swallow therapy continued. He has had some improvement in aphasia.  -PT  -Cardiology follow up appreciated      # Sepsis   -Follow up with ID/ Antibiotics      Will follow up as needed

## 2021-06-02 NOTE — PROCEDURAL SAFETY CHECKLIST WITH OR WITHOUT SEDATION - NSPOSTCOMMENTFT_GEN_ALL_CORE
Consent obtained by wife in person.  Time out/Brief @ 1618. Suction, ambu bag, end-tidal in place. Hurricaine spray used by Dr. Gonzalez prior to procedure.  Medicated with Versed 1mg and Morphine 1mg for conscious sedation.  Probe in @ 1622, bubbles @ 1630, Probe out @ 1630.  Normal findings as per Dr. Gonzalez.

## 2021-06-02 NOTE — PROGRESS NOTE ADULT - ASSESSMENT
87 y.o. 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 US abd remarkable for GB sludge, UA/urine cx unremarkable, was given IV cefepime 5/24-5/27, now on IV rocephin since 5/28, ? etiology - gb sludge/gut translocation? had previous FABIO 5/15 no vegetation or thrombus and repeat blood cx 5/27, 5/28 no growth so far.     1. Sepsis with Ecoli. GB sludge. Acute L embolic CVA  - imaging reviewed, neurology follow up noted, etiology of cva unclear  - etiology of ecoli ? gb sludge/gut translocation; urine unremarkable  - previous FABIO 5/15 no vegetation or thrombus  - repeat blood cx 5/27, 5/28 no growth  - s/p cefepime #3 5/24-5/27  - on IV rocephin 2gm daily #7  - will change to ceftin 250 mg po q 12 hours for 7 more days  - continue with antibiotic coverage  - fu cbc  - tolerating abx well so far; no side effects noted  - supportive care    2. other issues - care per medicine

## 2021-06-02 NOTE — PROGRESS NOTE ADULT - SUBJECTIVE AND OBJECTIVE BOX
CC:  CVA (29 May 2021 06:38)    Medical progress: Patient is seen and eval. hemodynamically stable. Denies any HA, CP, SOB. Patient is anticipated to have FABIO 6/3. As per patient's nurse patient sits on the chair throughout the day /  has a normal conversations.   Complaints: feeling tired  State of mind: maintains normal conversation  Plan: FABIO on thursday if notmral D/C planing, then need for DOLLY placement.  If LFT's trending up -  please re-consult Dr. Barrios. Care discussed with RN and .     ROS:  all other review of systems are negative unless indicated above.    PE:   ICU Vital Signs Last 24 Hrs  T(C): 36.9 (02 Jun 2021 08:08), Max: 37.1 (01 Jun 2021 23:19)  T(F): 98.5 (02 Jun 2021 08:08), Max: 98.7 (01 Jun 2021 23:19)  HR: 83 (02 Jun 2021 08:08) (83 - 91)  BP: 135/65 (02 Jun 2021 08:08) (132/61 - 135/65)  RR: 18 (02 Jun 2021 08:08) (16 - 18)  SpO2: 97% (02 Jun 2021 08:08) (94% - 97%)  Constitutional: very deconditioned, frail  HEENT: PERRL, EOMI, MMM.  Neck: Soft and supple, No carotid bruit, No JVD  Respiratory: Breath sounds are clear bilaterally, No wheezing, rales or rhonchi  Cardiovascular: S1 and S2, regular rate and rhythm, no murmur, rub or gallop.  Gastrointestinal: Bowel Sounds present, soft, nontender, nondistended, no guarding, no rebound, no mass.  Extremities: No peripheral edema  Vascular: 2+ peripheral pulses  Neurological: A/O x , no focal deficits  Musculoskeletal: 5/5 strength b/l upper and lower extremities  Skin:  no visible rashes.     < from: CT Head No Cont (05.29.21 @ 14:23) >  IMPRESSION:   Small acute cortical infarctions again seen in in the LEFT frontal and parietal lobes. Acute/subacute white matter infarctions also seen in the LEFT centrum semiovale. Mild periventricular and deep white matter ischemia is noted with old lacunar infarctions in the BILATERAL basal ganglia.    < end of copied text >    < from: MR Head No Cont (05.26.21 @ 14:23) >    IMPRESSION:    Numerous new areas of restricted diffusion in the leftfrontal, parietal, occipital, and temporal lobes, left basal ganglia, and right parietal lobe, suggesting acute infarcts. The left-sided infarcts have a watershed distribution. Areas of subacute infarct in the left parietal, occipital and temporal lobes are again seen. There are areas of susceptibility artifact associated with acute and subacute infarcts in the left parietal-occipital region, suggesting hemorrhagic transformation.    < end of copied text >  < from: Xray Kidney Ureter Bladder (05.25.21 @ 12:43) >    IMPRESSION:  Feeding tube to stomach.    < end of copied text >  < from: US Abdomen Complete (US Abdomen Complete .) (05.25.21 @ 11:20) >  IMPRESSION:  Echogenic mass in the gallbladder compatible with tumefactive sludge    No evidence of acute cholecystitis.    < end of copied text >  < from: Xray Chest 1 View- PORTABLE-Routine (Xray Chest 1 View- PORTABLE-Routine in AM.) (05.25.21 @ 09:11) >  IMPRESSION:  Basilar congestion.    < end of copied text >  < from: CT Abdomen and Pelvis w/ IV Cont (05.24.21 @ 12:46) >  IMPRESSION:  Mildly distended gallbladder with mild gallbladder wall and common bile duct enhancement.    No gallbladder wall thickening or calcified gallstones; correlation is recommended with LFTs and a right upper quadrant ultrasound could be obtained for further evaluation.      < end of copied text >  < from: CT Angio Neck w/ IV Cont (05.24.21 @ 12:44) >  IMPRESSION:      1. The CT perfusion study suggests generalized hypoperfusion of both hemispheres as well as within the posterior fossa without a focal abnormality. No area of core infarction. See above.  2. Moderate calcified plaque right carotid bifurcation but without significant stenosis. Focal stenosis left internal carotid 1 cm above the bifurcation with no tandemlesion noted.  3. Patent vertebrals.  4. Widely patent vasculature noted intracranially. Mild scattered atherosclerotic changes. No occlusion or tight stenosis suggested.    < end of copied text >  < from: Xray Chest 1 View- PORTABLE-Urgent (05.24.21 @ 11:41) >  IMPRESSION: Left loop recorder presently seen. No acute finding or change.    < end of copied text >    < from: Transesophageal Echocardiogram (05.15.21 @ 13:36) >  Impression     Summary     Mild (1+) mitral regurgitation is present.   Trace tricuspid valve regurgitation is present.   Mild aortic sclerosis is present with normal valvular opening.   Normal appearing pulmonic valve structure and function.   The left atrium is mildly dilated.   The left ventricle is normal in size, wall thickness, wall motion and   contractility.   Normal appearing right ventricle structure and function.   Normal appearing right atrium.   No intracardiac masses, thrombi or vegetations were seen.   Mild atheroscleorsis of the thoracic aorta.   No PFO/ASD.       OFF SERVICE:    87M.  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  readmitted 05/24/2021.  presented to ED c/o vomiting.  in ED, acutely developed RUE weakness and aphasia.    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 Some may be related to watershed infarcts, but there is still a ? of embolic phenomenon. ILR interrogation was negative for a-fib. Possible repeat FABIO (FABIO several weeks ago negative for thrombus).  - DAPT / STATIN therapy  - F/U CT performed 5/29 did not show any evidence of hemorrhagic conversion  - Speech and swallow follow-u appreciated  - PT  - Neurology following    # Sepsis with Ecoli. GB sludge   - etiology of ecoli ? gb sludge/gut translocation  - previous FABIO 5/15 no vegetation or thrombus  - plan for repeat FABIO 6/1  - repeat blood cx 5/27, 5/28 no growth  - s/p cefepime #3 5/24-5/27  - on IV rocephin 2gm daily #6  - tolerating antibiotics well    # distended GB and CBD.  elevated LFTs.  - LFTs slowly trending down  - 05/25 US:  no evidence of acute cholecystitis.  - GI eval    # DVT prophylaxis.  - UFH sq.

## 2021-06-03 LAB
ALBUMIN SERPL ELPH-MCNC: 3.1 G/DL — LOW (ref 3.3–5)
ALP SERPL-CCNC: 141 U/L — HIGH (ref 40–120)
ALT FLD-CCNC: 86 U/L — HIGH (ref 12–78)
ANION GAP SERPL CALC-SCNC: 7 MMOL/L — SIGNIFICANT CHANGE UP (ref 5–17)
AST SERPL-CCNC: 42 U/L — HIGH (ref 15–37)
BILIRUB SERPL-MCNC: 1.2 MG/DL — SIGNIFICANT CHANGE UP (ref 0.2–1.2)
BUN SERPL-MCNC: 34 MG/DL — HIGH (ref 7–23)
CALCIUM SERPL-MCNC: 9.2 MG/DL — SIGNIFICANT CHANGE UP (ref 8.5–10.1)
CHLORIDE SERPL-SCNC: 106 MMOL/L — SIGNIFICANT CHANGE UP (ref 96–108)
CO2 SERPL-SCNC: 25 MMOL/L — SIGNIFICANT CHANGE UP (ref 22–31)
CREAT SERPL-MCNC: 0.89 MG/DL — SIGNIFICANT CHANGE UP (ref 0.5–1.3)
GLUCOSE SERPL-MCNC: 169 MG/DL — HIGH (ref 70–99)
HCT VFR BLD CALC: 43.2 % — SIGNIFICANT CHANGE UP (ref 39–50)
HGB BLD-MCNC: 14.3 G/DL — SIGNIFICANT CHANGE UP (ref 13–17)
MCHC RBC-ENTMCNC: 30 PG — SIGNIFICANT CHANGE UP (ref 27–34)
MCHC RBC-ENTMCNC: 33.1 GM/DL — SIGNIFICANT CHANGE UP (ref 32–36)
MCV RBC AUTO: 90.6 FL — SIGNIFICANT CHANGE UP (ref 80–100)
PLATELET # BLD AUTO: 339 K/UL — SIGNIFICANT CHANGE UP (ref 150–400)
POTASSIUM SERPL-MCNC: 4.2 MMOL/L — SIGNIFICANT CHANGE UP (ref 3.5–5.3)
POTASSIUM SERPL-SCNC: 4.2 MMOL/L — SIGNIFICANT CHANGE UP (ref 3.5–5.3)
PROT SERPL-MCNC: 7.5 GM/DL — SIGNIFICANT CHANGE UP (ref 6–8.3)
RBC # BLD: 4.77 M/UL — SIGNIFICANT CHANGE UP (ref 4.2–5.8)
RBC # FLD: 13.7 % — SIGNIFICANT CHANGE UP (ref 10.3–14.5)
SODIUM SERPL-SCNC: 138 MMOL/L — SIGNIFICANT CHANGE UP (ref 135–145)
WBC # BLD: 19.07 K/UL — HIGH (ref 3.8–10.5)
WBC # FLD AUTO: 19.07 K/UL — HIGH (ref 3.8–10.5)

## 2021-06-03 PROCEDURE — 99232 SBSQ HOSP IP/OBS MODERATE 35: CPT

## 2021-06-03 RX ADMIN — Medication 1 DROP(S): at 21:47

## 2021-06-03 RX ADMIN — HEPARIN SODIUM 5000 UNIT(S): 5000 INJECTION INTRAVENOUS; SUBCUTANEOUS at 21:46

## 2021-06-03 RX ADMIN — Medication 650 MILLIGRAM(S): at 16:40

## 2021-06-03 RX ADMIN — HEPARIN SODIUM 5000 UNIT(S): 5000 INJECTION INTRAVENOUS; SUBCUTANEOUS at 06:17

## 2021-06-03 RX ADMIN — Medication 81 MILLIGRAM(S): at 10:24

## 2021-06-03 RX ADMIN — Medication 1 DROP(S): at 10:24

## 2021-06-03 RX ADMIN — CLOPIDOGREL BISULFATE 75 MILLIGRAM(S): 75 TABLET, FILM COATED ORAL at 10:24

## 2021-06-03 RX ADMIN — CHLORHEXIDINE GLUCONATE 1 APPLICATION(S): 213 SOLUTION TOPICAL at 06:17

## 2021-06-03 RX ADMIN — ATORVASTATIN CALCIUM 80 MILLIGRAM(S): 80 TABLET, FILM COATED ORAL at 21:47

## 2021-06-03 RX ADMIN — Medication 25 MILLIGRAM(S): at 10:24

## 2021-06-03 RX ADMIN — Medication 250 MILLIGRAM(S): at 21:46

## 2021-06-03 RX ADMIN — Medication 250 MILLIGRAM(S): at 06:17

## 2021-06-03 RX ADMIN — Medication 650 MILLIGRAM(S): at 15:41

## 2021-06-03 RX ADMIN — HEPARIN SODIUM 5000 UNIT(S): 5000 INJECTION INTRAVENOUS; SUBCUTANEOUS at 13:34

## 2021-06-03 NOTE — PROGRESS NOTE ADULT - NUTRITIONAL ASSESSMENT
This patient has been assessed with a concern for Malnutrition and has been determined to have a diagnosis/diagnoses of Severe protein-calorie malnutrition.    This patient is being managed with:   Diet Dysphagia 1 Pureed-Honey Consistency Fluid-  Entered: May 28 2021 12:31PM    
This patient has been assessed with a concern for Malnutrition and has been determined to have a diagnosis/diagnoses of Severe protein-calorie malnutrition.    This patient is being managed with:   Diet NPO with Tube Feed-  Tube Feeding Modality: Nasogastric  Glucerna 1.5 Chi (GLUCERNA1.5)  Total Volume for 24 Hours (mL): 1680  Continuous  Until Goal Tube Feed Rate (mL per Hour): 70  Tube Feed Duration (in Hours): 24  Tube Feed Start Time: 00:00  Free Water Flush  Pump   Rate (mL per Hour): 0   Frequency: Every Hour    Duration (Hours): 24    Start Time: 00:00  No Carb Prosource TF     Qty per Day:  1  Entered: May 26 2021  9:38AM    Diet NPO with Tube Feed-  Tube Feeding Modality: Orogastric  Glucerna 1.5 Chi (GLUCERNA1.5)  Total Volume for 24 Hours (mL): 1320  Continuous  Starting Tube Feed Rate {mL per Hour}: 20  Increase Tube Feed Rate by (mL): 20     Every 6 hours  Until Goal Tube Feed Rate (mL per Hour): 55  Tube Feed Duration (in Hours): 24  Tube Feed Start Time: 20:00  Entered: May 25 2021  5:36PM    The following pending diet order is being considered for treatment of Severe protein-calorie malnutrition:null

## 2021-06-03 NOTE — PROGRESS NOTE ADULT - ASSESSMENT
85 YO M with a PMH of RA, Diverticulitis, CVA in 3/2021 and CVA 5/14. In ER acutely developed RUE weakness and aphasia. Neutropenic with elevated lactic acid .  Also found ot have bacteremia.    # Acute Left CVA with Rt Hemiparesis   -Patient was not a  TPA candidate due to Recent CVA and Pt's medical condition with Hypoxia and Sepsis-NO Intervention as no LVO  -Follow up MRI showed numerous areas of restricted diffusion. Some may be related to watershed infarcts, but there is still a ? of embolic phenomenon. ILR interrogation was negative for a-fib.   -Repeat FABIO performed on 6/2/21. Official report not yet available, but no thrombus or vegetation reported  -Continue Aspirin +  Plavix at this time.  -Continue Statin  -F/U CT performed 5/29 did not show any evidence of hemorrhagic conversion  -Gradual improvement of aphasia.   -PT  -Cardiology follow up appreciated    # Sepsis with ecoli  -changed to PO antibiotics by ID  -treatment as per medicine/ID    Will benefit from rehab with PT and speech therapy.    Please call back if additional input from neurology service is needed.

## 2021-06-03 NOTE — PROGRESS NOTE ADULT - PROVIDER SPECIALTY LIST ADULT
Hospitalist
Infectious Disease
Neurology
Cardiology
Hospitalist
Cardiology
Critical Care
Hospitalist
Hospitalist
Infectious Disease
Neurology
Neurology
Hospitalist
Neurology
Gastroenterology
Cardiology
Critical Care
Critical Care

## 2021-06-03 NOTE — PROGRESS NOTE ADULT - SUBJECTIVE AND OBJECTIVE BOX
Interval History:  6/3/21: Had FABIO yesterday.    MEDICATIONS  (STANDING):  artificial  tears Solution 1 Drop(s) Both EYES two times a day  aspirin  chewable 81 milliGRAM(s) Oral daily  atorvastatin 80 milliGRAM(s) Oral at bedtime  cefuroxime   Tablet 250 milliGRAM(s) Oral every 12 hours  chlorhexidine 4% Liquid 1 Application(s) Topical <User Schedule>  clopidogrel Tablet 75 milliGRAM(s) Oral daily  heparin   Injectable 5000 Unit(s) SubCutaneous every 8 hours  metoprolol succinate ER 25 milliGRAM(s) Oral daily    MEDICATIONS  (PRN):  acetaminophen   Tablet .. 650 milliGRAM(s) Oral every 8 hours PRN Mild Pain (1 - 3)      Allergies    penicillin (Unknown)    Intolerances        PHYSICAL EXAM:  Vital Signs Last 24 Hrs  T(F): 98.8 (06-03-21 @ 07:59)  HR: 97 (06-03-21 @ 07:59)  BP: 137/63 (06-03-21 @ 07:59)  RR: 17 (06-03-21 @ 07:59)    GENERAL: NAD, well-groomed, well-developed  HEAD:  Atraumatic, Normocephalic  Neuro:  Awake, alert  Able to name some objects -" thumb",  "glasses". Unable to name "watch". Follows commands with and without visual cues.  CN: R facial droop, + dysarthria  motor: right sided weakness  sensory: intact to light touch  coordination: impaired on right      LABS:                        15.0   11.16 )-----------( 183      ( 02 Jun 2021 07:30 )             45.7     06-02    135  |  107  |  32<H>  ----------------------------<  126<H>  4.8   |  19<L>  |  0.85    Ca    9.0      02 Jun 2021 07:30    TPro  7.1  /  Alb  2.7<L>  /  TBili  0.9  /  DBili  x   /  AST  79<H>  /  ALT  103<H>  /  AlkPhos  144<H>  06-02          RADIOLOGY & ADDITIONAL STUDIES:    CT head 5/24/21:  1)  chronic ischemic changes in both hemispheres with volume loss and involutional change. No acute infarct, hemorrhage, or space occupying lesion suggested.  2)  follow-up MR imaging may be considered for further assessment.    CTA head/CTA neck/CT perfusion 5/24/21:  1. The CT perfusion study suggests generalized hypoperfusion of both hemispheres as well as within the posterior fossa without a focal abnormality. No area of core infarction. See above.  2. Moderate calcified plaque right carotid bifurcation but without significant stenosis. Focal stenosis left internal carotid 1 cm above the bifurcation with no tandem lesion noted.  3. Patent vertebrals.  4. Widely patent vasculature noted intracranially. Mild scattered atherosclerotic changes. No occlusion or tight stenosis suggested.    CT head 5/25/21:  Area of hypodensity in the left parietal occipital region suggesting acute/subacute infarct. No evidence of hemorrhagic transformation.    MR head 5/26/21:  Numerous new areas of restricted diffusion in the left frontal, parietal, occipital, and temporal lobes, left basal ganglia, and right parietal lobe, suggesting acute infarcts. The left-sided infarcts have a watershed distribution. Areas of subacute infarct in the left parietal, occipital and temporal lobes are again seen. There are areas of susceptibility artifact associated with acute and subacute infarcts in the left parietal-occipital region, suggesting hemorrhagic transformation.    eeg 5/25/21:  1.This is an abnormal EEG due to the presence of mild generalized background slowing may be on the basis of metabolic causes.  2. Marked left frontal and temporal slowing indicative of underlying structural pathology. Clinical correlation recommended.  3. No epileptiform activity noted. Clinical correlation recommended.    24 Hour eeg 5/26/21-5/27/21:  This is an abnormal prolonged video EEG due to the presence of   Mild generalized slowing with superimposed moderate to severe slowing on the left side indicative of underlying structural pathology. Clinical correlation recommended.  No epileptiform activity noted. Dr. Martínez informed of the results.      CT head 5/29/21:  Small acute cortical infarctions again seen in in the LEFT frontal and parietal lobes. Acute/subacute white matter infarctions also seen in the LEFT centrum semiovale. Mild periventricular and deep white matter ischemia is noted with old lacunar infarctions in the BILATERAL basal ganglia.    FABIO 6/2/21: Report pending

## 2021-06-03 NOTE — PROGRESS NOTE ADULT - SUBJECTIVE AND OBJECTIVE BOX
Cheif complaints and Diagnosis: recurrent Lt CVA/ right Hemiparesis    Subjective: no complaints      REVIEW OF SYSTEMS:    CONSTITUTIONAL: No weakness, fevers or chills  EYES/ENT: No visual changes;  No vertigo or throat pain   NECK: No pain or stiffness  RESPIRATORY: No cough, wheezing, hemoptysis; No shortness of breath  CARDIOVASCULAR: No chest pain or palpitations  GASTROINTESTINAL: No abdominal or epigastric pain. No nausea, vomiting, or hematemesis; No diarrhea or constipation. No melena or hematochezia.  GENITOURINARY: No dysuria, frequency or hematuria  NEUROLOGICAL: No numbness or weakness  SKIN: No itching, burning, rashes, or lesions   All other review of systems is negative unless indicated above      Vital Signs Last 24 Hrs  T(C): 37.1 (03 Jun 2021 07:59), Max: 37.3 (02 Jun 2021 23:48)  T(F): 98.8 (03 Jun 2021 07:59), Max: 99.1 (02 Jun 2021 23:48)  HR: 97 (03 Jun 2021 07:59) (96 - 108)  BP: 137/63 (03 Jun 2021 07:59) (123/59 - 159/64)  BP(mean): --  RR: 17 (03 Jun 2021 07:59) (16 - 17)  SpO2: 95% (03 Jun 2021 07:59) (94% - 96%)    HEENT:   pupils equal and reactive, EOMI, no oropharyngeal lesions, erythema, exudates, oral thrush    NECK:   supple, no carotid bruits, no palpable lymph nodes, no thyromegaly    CV:  +S1, +S2, regular, no murmurs or rubs    RESP:   lungs clear to auscultation bilaterally, no wheezing, rales, rhonchi, good air entry bilaterally    BREAST:  not examined    GI:  abdomen soft, non-tender, non-distended, normal BS, no bruits, no abdominal masses, no palpable masses    RECTAL:  not examined    :  not examined    MSK:   normal muscle tone, no atrophy, no rigidity, no contractions    EXT:   no clubbing, no cyanosis, no edema, no calf pain, swelling or erythema    VASCULAR:  pulses equal and symmetric in the upper and lower extremities    NEURO:  AAOX3, no focal neurological deficits, follows all commands, able to move extremities spontaneously    SKIN:  no ulcers, lesions or rashes    MEDICATIONS  (STANDING):  artificial  tears Solution 1 Drop(s) Both EYES two times a day  aspirin  chewable 81 milliGRAM(s) Oral daily  atorvastatin 80 milliGRAM(s) Oral at bedtime  cefuroxime   Tablet 250 milliGRAM(s) Oral every 12 hours  chlorhexidine 4% Liquid 1 Application(s) Topical <User Schedule>  clopidogrel Tablet 75 milliGRAM(s) Oral daily  heparin   Injectable 5000 Unit(s) SubCutaneous every 8 hours  metoprolol succinate ER 25 milliGRAM(s) Oral daily    MEDICATIONS  (PRN):  acetaminophen   Tablet .. 650 milliGRAM(s) Oral every 8 hours PRN Mild Pain (1 - 3)      03 Jun 2021 07:21    138    |  106    |  34     ----------------------------<  169    4.2     |  25     |  0.89     Ca    9.2        03 Jun 2021 07:21    TPro  7.5    /  Alb  3.1    /  TBili  1.2    /  DBili  x      /  AST  42     /  ALT  86     /  AlkPhos  141    03 Jun 2021 07:21  LIVER FUNCTIONS - ( 03 Jun 2021 07:21 )  Alb: 3.1 g/dL / Pro: 7.5 gm/dL / ALK PHOS: 141 U/L / ALT: 86 U/L / AST: 42 U/L / GGT: x         CBC Full  -  ( 03 Jun 2021 07:21 )  WBC Count : 19.07 K/uL  Hemoglobin : 14.3 g/dL  Hematocrit : 43.2 %  Platelet Count - Automated : 339 K/uL  Mean Cell Volume : 90.6 fl  Mean Cell Hemoglobin : 30.0 pg  Mean Cell Hemoglobin Concentration : 33.1 gm/dL          Assessment and Plan:        87M.  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  readmitted 05/24/2021.  presented to ED c/o vomiting.  in ED, acutely developed RUE weakness and aphasia.    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 Some may be related to watershed infarcts, but there is still a ? of embolic phenomenon. ILR interrogation was negative for a-fib. Possible repeat FABIO (FABIO several weeks ago negative for thrombus).  - DAPT / STATIN therapy  - F/U CT performed 5/29 did not show any evidence of hemorrhagic conversion  - Speech and swallow follow-u appreciated  - PT  - Neurology following   -Repeat FABIO performed on 6/2/21. Official report not yet available, but no thrombus or vegetation reported  -Splint on right hand  to prevent conctracture/ remove splint with working with physical therapy      # Sepsis with Ecoli. GB sludge   - etiology of ecoli ? gb sludge/gut translocation  - previous FABIO 5/15 no vegetation or thrombus  - plan for repeat FABIO 6/1  - repeat blood cx 5/27, 5/28 no growth  - s/p cefepime #3 5/24-5/27  - on IV rocephin 2gm daily #6  - tolerating antibiotics well    # distended GB and CBD.  elevated LFTs.  - LFTs slowly trending down  - 05/25 US:  no evidence of acute cholecystitis.  - GI eval    # DVT prophylaxis.  - UFH sq.    Dispo: to [facility] , auth being sent today

## 2021-06-03 NOTE — PROGRESS NOTE ADULT - NSICDXPILOT_GEN_ALL_CORE
[place]

## 2021-06-04 ENCOUNTER — TRANSCRIPTION ENCOUNTER (OUTPATIENT)
Age: 86
End: 2021-06-04

## 2021-06-04 VITALS — DIASTOLIC BLOOD PRESSURE: 61 MMHG | SYSTOLIC BLOOD PRESSURE: 136 MMHG | HEART RATE: 78 BPM

## 2021-06-04 PROCEDURE — 99239 HOSP IP/OBS DSCHRG MGMT >30: CPT

## 2021-06-04 RX ORDER — JNJ-78436735 50000000000 [PFU]/.5ML
0.5 SUSPENSION INTRAMUSCULAR
Qty: 0 | Refills: 0 | DISCHARGE

## 2021-06-04 RX ORDER — CEFUROXIME AXETIL 250 MG
1 TABLET ORAL
Qty: 0 | Refills: 0 | DISCHARGE
Start: 2021-06-04

## 2021-06-04 RX ADMIN — Medication 81 MILLIGRAM(S): at 09:15

## 2021-06-04 RX ADMIN — HEPARIN SODIUM 5000 UNIT(S): 5000 INJECTION INTRAVENOUS; SUBCUTANEOUS at 05:21

## 2021-06-04 RX ADMIN — HEPARIN SODIUM 5000 UNIT(S): 5000 INJECTION INTRAVENOUS; SUBCUTANEOUS at 13:30

## 2021-06-04 RX ADMIN — Medication 1 DROP(S): at 09:15

## 2021-06-04 RX ADMIN — Medication 25 MILLIGRAM(S): at 09:15

## 2021-06-04 RX ADMIN — Medication 250 MILLIGRAM(S): at 09:15

## 2021-06-04 RX ADMIN — CHLORHEXIDINE GLUCONATE 1 APPLICATION(S): 213 SOLUTION TOPICAL at 05:22

## 2021-06-04 RX ADMIN — CLOPIDOGREL BISULFATE 75 MILLIGRAM(S): 75 TABLET, FILM COATED ORAL at 09:15

## 2021-06-04 NOTE — DISCHARGE NOTE PROVIDER - CARE PROVIDER_API CALL
Julius Conklin   INTERNAL MEDICINE  [address] [place] [address]  Phone: [phone]  Fax: [phone]  Follow Up Time:

## 2021-06-04 NOTE — DISCHARGE NOTE PROVIDER - HOSPITAL COURSE
Vital Signs Last 24 Hrs  T(C): 36.8 (04 Jun 2021 07:53), Max: 36.8 (03 Jun 2021 23:31)  T(F): 98.2 (04 Jun 2021 07:53), Max: 98.2 (03 Jun 2021 23:31)  HR: 78 (04 Jun 2021 09:12) (78 - 94)  BP: 136/61 (04 Jun 2021 09:12) (127/57 - 138/62)  BP(mean): --  RR: 17 (04 Jun 2021 07:53) (17 - 18)  SpO2: 100% (04 Jun 2021 07:53) (96% - 100%)    HEENT:   pupils equal and reactive, EOMI, no oropharyngeal lesions, erythema, exudates, oral thrush    NECK:   supple, no carotid bruits, no palpable lymph nodes, no thyromegaly    CV:  +S1, +S2, regular, no murmurs or rubs    RESP:   lungs clear to auscultation bilaterally, no wheezing, rales, rhonchi, good air entry bilaterally    BREAST:  not examined    GI:  abdomen soft, non-tender, non-distended, normal BS, no bruits, no abdominal masses, no palpable masses    RECTAL:  not examined    :  not examined    MSK:   normal muscle tone, no atrophy, no rigidity, no contractions    EXT:   no clubbing, no cyanosis, no edema, no calf pain, swelling or erythema    VASCULAR:  pulses equal and symmetric in the upper and lower extremities    NEURO:  AAOX3, no focal neurological deficits, follows all commands, able to move extremities spontaneously    SKIN:  no ulcers, lesions or rashes    03 Jun 2021 07:21    138    |  106    |  34     ----------------------------<  169    4.2     |  25     |  0.89     Ca    9.2        03 Jun 2021 07:21    TPro  7.5    /  Alb  3.1    /  TBili  1.2    /  DBili  x      /  AST  42     /  ALT  86     /  AlkPhos  141    03 Jun 2021 07:21  LIVER FUNCTIONS - ( 03 Jun 2021 07:21 )  Alb: 3.1 g/dL / Pro: 7.5 gm/dL / ALK PHOS: 141 U/L / ALT: 86 U/L / AST: 42 U/L / GGT: x         CBC Full  -  ( 03 Jun 2021 07:21 )  WBC Count : 19.07 K/uL  Hemoglobin : 14.3 g/dL  Hematocrit : 43.2 %  Platelet Count - Automated : 339 K/uL  Mean Cell Volume : 90.6 fl  Mean Cell Hemoglobin : 30.0 pg  Mean Cell Hemoglobin Concentration : 33.1 gm/dL        Hospital Course:      87M.  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  readmitted 05/24/2021.  presented to ED c/o vomiting.  in ED, acutely developed RUE weakness and aphasia.    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 Some may be related to watershed infarcts, but there is still a ? of embolic phenomenon. ILR interrogation was negative for a-fib. Possible repeat FABIO (FABIO several weeks ago negative for thrombus).  - DAPT / STATIN therapy  - F/U CT performed 5/29 did not show any evidence of hemorrhagic conversion   -Repeat FABIO performed on 6/2/21. Official report not yet available, but no thrombus or vegetation reported  -Splint on right hand  to prevent conctracture/ remove splint with working with physical therapy      # Sepsis with Ecoli. GB sludge   -FABIO negative  - repeat blood cx 5/27, 5/28 no growth  - s/p cefepime #3 5/24-5/27  -s/p ceftriaxone >> now changed to PO ceftin for 7 more days

## 2021-06-04 NOTE — DISCHARGE NOTE PROVIDER - NSDCMRMEDTOKEN_GEN_ALL_CORE_FT
ALPRAZolam 0.5 mg oral tablet: 1 tab(s) orally once a day (at bedtime), As Needed  Aspirin Enteric Coated 81 mg oral delayed release tablet: 1 tab(s) orally once a day  atorvastatin 40 mg oral tablet: 1 tab(s) orally once a day (at bedtime)   cefuroxime 250 mg oral tablet: 1 tab(s) orally every 12 hours  clopidogrel 75 mg oral tablet: 1 tab(s) orally once a day  omeprazole 40 mg oral delayed release capsule: 1 cap(s) orally once a day

## 2021-06-04 NOTE — DISCHARGE NOTE PROVIDER - NSDCCPCAREPLAN_GEN_ALL_CORE_FT
PRINCIPAL DISCHARGE DIAGNOSIS  Diagnosis: Sepsis  Assessment and Plan of Treatment:   *      SECONDARY DISCHARGE DIAGNOSES  Diagnosis: Cerebrovascular accident (CVA), unspecified mechanism  Assessment and Plan of Treatment:      PRINCIPAL DISCHARGE DIAGNOSIS  Diagnosis: Sepsis  Assessment and Plan of Treatment:   *continue with Oral Ceftin as written for anohter 7 days (stop 6/11)  *Follow up outpatient with primary care provider in one week.      SECONDARY DISCHARGE DIAGNOSES  Diagnosis: Cerebrovascular accident (CVA), unspecified mechanism  Assessment and Plan of Treatment:

## 2021-06-12 DIAGNOSIS — Z87.891 PERSONAL HISTORY OF NICOTINE DEPENDENCE: ICD-10-CM

## 2021-06-12 DIAGNOSIS — R11.10 VOMITING, UNSPECIFIED: ICD-10-CM

## 2021-06-12 DIAGNOSIS — K82.9 DISEASE OF GALLBLADDER, UNSPECIFIED: ICD-10-CM

## 2021-06-12 DIAGNOSIS — D72.819 DECREASED WHITE BLOOD CELL COUNT, UNSPECIFIED: ICD-10-CM

## 2021-06-12 DIAGNOSIS — R65.21 SEVERE SEPSIS WITH SEPTIC SHOCK: ICD-10-CM

## 2021-06-12 DIAGNOSIS — M06.9 RHEUMATOID ARTHRITIS, UNSPECIFIED: ICD-10-CM

## 2021-06-12 DIAGNOSIS — E43 UNSPECIFIED SEVERE PROTEIN-CALORIE MALNUTRITION: ICD-10-CM

## 2021-06-12 DIAGNOSIS — A41.51 SEPSIS DUE TO ESCHERICHIA COLI [E. COLI]: ICD-10-CM

## 2021-06-12 DIAGNOSIS — K83.8 OTHER SPECIFIED DISEASES OF BILIARY TRACT: ICD-10-CM

## 2021-06-12 DIAGNOSIS — Z79.02 LONG TERM (CURRENT) USE OF ANTITHROMBOTICS/ANTIPLATELETS: ICD-10-CM

## 2021-06-12 DIAGNOSIS — G81.90 HEMIPLEGIA, UNSPECIFIED AFFECTING UNSPECIFIED SIDE: ICD-10-CM

## 2021-06-12 DIAGNOSIS — R29.713 NIHSS SCORE 13: ICD-10-CM

## 2021-06-12 DIAGNOSIS — R47.01 APHASIA: ICD-10-CM

## 2021-06-12 DIAGNOSIS — Z79.82 LONG TERM (CURRENT) USE OF ASPIRIN: ICD-10-CM

## 2021-06-12 DIAGNOSIS — E87.2 ACIDOSIS: ICD-10-CM

## 2021-06-12 DIAGNOSIS — J96.01 ACUTE RESPIRATORY FAILURE WITH HYPOXIA: ICD-10-CM

## 2021-06-12 DIAGNOSIS — K21.9 GASTRO-ESOPHAGEAL REFLUX DISEASE WITHOUT ESOPHAGITIS: ICD-10-CM

## 2021-06-12 DIAGNOSIS — Z86.73 PERSONAL HISTORY OF TRANSIENT ISCHEMIC ATTACK (TIA), AND CEREBRAL INFARCTION WITHOUT RESIDUAL DEFICITS: ICD-10-CM

## 2021-06-12 DIAGNOSIS — I63.89 OTHER CEREBRAL INFARCTION: ICD-10-CM

## 2021-06-12 DIAGNOSIS — Z95.818 PRESENCE OF OTHER CARDIAC IMPLANTS AND GRAFTS: ICD-10-CM

## 2021-06-12 DIAGNOSIS — R29.810 FACIAL WEAKNESS: ICD-10-CM

## 2021-06-21 ENCOUNTER — APPOINTMENT (OUTPATIENT)
Dept: NEUROLOGY | Facility: CLINIC | Age: 86
End: 2021-06-21

## 2021-12-03 NOTE — ED PROVIDER NOTE - PHYSICAL EXAMINATION
Constitutional: NAD AAOx3  Eyes: PERRLA EOMI  Head: NCAT  Mouth: MMM  Cardiac: s1s2. rrr  Lungs: CTA B/L. No accessory muscle use  Abd: soft, nd. NTTP. no r/g/r  Neuro: CN2-12 intact  Skin: No rashes
none

## 2022-02-03 NOTE — SWALLOW BEDSIDE ASSESSMENT ADULT - CONSISTENCIES ADMINISTERED
Detail Level: Detailed Depth Of Biopsy: dermis Was A Bandage Applied: Yes honey thick/puree Size Of Lesion In Cm: 0.4 Biopsy Type: H and E Biopsy Method: double edge Personna blade Anesthesia Type: 1% lidocaine with epinephrine Anesthesia Volume In Cc (Will Not Render If 0): 0.5 Additional Anesthesia Volume In Cc (Will Not Render If 0): 0 Hemostasis: Aluminum Chloride Wound Care: Petrolatum Dressing: bandage Destruction After The Procedure: No Type Of Destruction Used: Curettage Curettage Text: The wound bed was treated with curettage after the biopsy was performed. Cryotherapy Text: The wound bed was treated with cryotherapy after the biopsy was performed. Electrodesiccation Text: The wound bed was treated with electrodesiccation after the biopsy was performed. Electrodesiccation And Curettage Text: The wound bed was treated with electrodesiccation and curettage after the biopsy was performed. Silver Nitrate Text: The wound bed was treated with silver nitrate after the biopsy was performed. Lab: 6 Lab Facility: 3 Consent: Written consent was obtained and risks were reviewed including but not limited to scarring, infection, bleeding, scabbing, incomplete removal, nerve damage and allergy to anesthesia. Post-Care Instructions: I reviewed with the patient in detail post-care instructions. Patient is to keep the biopsy site dry overnight, and then apply bacitracin twice daily until healed. Patient may apply hydrogen peroxide soaks to remove any crusting. Notification Instructions: Patient will be notified of biopsy results. However, patient instructed to call the office if not contacted within 2 weeks. Billing Type: Third-Party Bill Information: Selecting Yes will display possible errors in your note based on the variables you have selected. This validation is only offered as a suggestion for you. PLEASE NOTE THAT THE VALIDATION TEXT WILL BE REMOVED WHEN YOU FINALIZE YOUR NOTE. IF YOU WANT TO FAX A PRELIMINARY NOTE YOU WILL NEED TO TOGGLE THIS TO 'NO' IF YOU DO NOT WANT IT IN YOUR FAXED NOTE.

## 2022-02-28 NOTE — ED ADULT NURSE NOTE - MUSCULOSKELETAL WDL
.    [facility]                                                                                          Hospitalist Progress Note  Johnny Marks MD  Answering service: [phone] or [phone] from in house phone        Date of Service:  2022  NAME:  Sheryl Peterson  :  1930  MRN:  [id number]      Admission Summary:   Sheryl Peterson is a 80 y.o. female who Past medical history of CKD, hypertension, rheumatoid arthritis, hypothyroidism, left elbow septic arthritis of unknown organism who was recently treated by orthopedic surgery with prolonged IV antibiotics, patient presented to ED complaining of worsening left elbow pain for the last 4 days. Patient is stating that about 4 days ago she developed a generalized rash went to an urgent care center where she was given a Medrol Dosepak and topical steroids, she stating that her rash disappeared however her elbow pain has been increasing and she is having difficulty with her range of motion, she also developed fever.      Left elbow septic arthritis versus cellulitis given history of extensive left elbow abnormalities in the past, patient has history of septic arthritis in the left elbow in the past without any known organism, has been followed by orthopedic surgery has been treated with prolonged IV antibiotics. Will consult ID for joint aspiration and for possible I&D if septic arthritis is confirmed. Meanwhile we will start patient on empiric broad-spectrum IV antibiotics. Patient will also be started on her home medications of hypertension, RA and hypothyroidism. DVT prophylaxis, fall aspiration and seizure precautions will be implemented. Interval history / Subjective:   2022.   Saw patient this morning, reporting marked improvement of left elbow pain, and left upper extremity itching, Unfortunately patient's MRI is positive for osteomyelitis and septic arthritis, she is scheduled for I&D and possible surgery. She has been followed by orthopedic surgery, ID is also been consulted. Denies any fever, chills, nausea, vomiting. Assessment & Plan:     Septic arthritis  Left elbow osteomyelitis  Rheumatoid arthritis  CKD  Hypertension  Hypothyroidism  Continue broad-spectrum empiric IV antibiotics, infectious disease and orthopedic surgery on board. Code status: Full code  DVT prophylaxis: Lovenox    Care Plan discussed with: Patient/Family  Anticipated Disposition: Home w/Family  Anticipated Discharge: Less than 24 hours     Hospital Problems  Date Reviewed: 12/2/2020          Codes Class Noted POA    Septic joint ([facility] [address] ICD-10-CM: M00.9  ICD-9-CM: 711.00  2/27/2022 Unknown                Review of Systems:   A comprehensive review of systems was negative except for that written in the HPI. Vital Signs:    Last 24hrs VS reviewed since prior progress note. Most recent are:  Visit Vitals  BP (!) 150/69 (BP 1 Location: Right upper arm, BP Patient Position: At rest)   Pulse 76   Temp 98.7 °F (37.1 °C)   Resp 18   Ht 4' 11\" (1.499 m)   Wt 50.9 kg (112 lb 3.4 oz)   SpO2 96%   BMI 22.66 kg/m²         Intake/Output Summary (Last 24 hours) at 2/28/2022 1606  Last data filed at 2/28/2022 1302  Gross per 24 hour   Intake 300 ml   Output 200 ml   Net 100 ml        Physical Examination:     I had a face to face encounter with this patient and independently examined them on 2/28/2022 as outlined below:          Constitutional:  No acute distress, cooperative, pleasant    ENT:  Oral mucosa moist, oropharynx benign. Resp:  CTA bilaterally. No wheezing/rhonchi/rales. No accessory muscle use   CV:  Regular rhythm, normal rate, no murmurs, gallops, rubs    GI:  Soft, non distended, non tender. normoactive bowel sounds, no hepatosplenomegaly     Musculoskeletal:  No edema, warm, 2+ pulses throughout    Neurologic:  Moves all extremities.   AAOx3, CN II-XII reviewed            Data Review:    Review and/or order of clinical lab test      Labs:     Recent Labs     02/28/22  0048 02/27/22 0422   WBC 9.4 13.5*   HGB 9.8* 11.2*   HCT 30.2* 34.9*    354     Recent Labs     02/28/22  0048 02/27/22  1018 02/27/22 0422     --  133*   K 3.0*  --  3.5     --  100   CO2 26  --  24   BUN 11  --  16   CREA 0.54*  --  0.82   *  --  107*   CA 8.0*  --  8.8   MG 2.3  --   --    URICA  --  1.9*  --      Recent Labs     02/28/22 0048 02/27/22 0422   ALT 12 17   AP 70 84   TBILI 0.6 0.5   TP 5.9* 7.5   ALB 2.7* 3.2*   GLOB 3.2 4.3*     No results for input(s): INR, PTP, APTT, INREXT in the last 72 hours. No results for input(s): FE, TIBC, PSAT, FERR in the last 72 hours. Lab Results   Component Value Date/Time    Folate 15.4 12/02/2020 04:28 AM      No results for input(s): PH, PCO2, PO2 in the last 72 hours. No results for input(s): CPK, CKNDX, TROIQ in the last 72 hours.     No lab exists for component: CPKMB  Lab Results   Component Value Date/Time    Cholesterol, total 191 09/20/2017 08:37 AM    HDL Cholesterol 33 (L) 09/20/2017 08:37 AM    LDL, calculated 128 (H) 09/20/2017 08:37 AM    Triglyceride 148 09/20/2017 08:37 AM     Lab Results   Component Value Date/Time    Glucose (POC) 106 (H) 04/02/2021 11:34 AM    Glucose (POC) 114 (H) 04/02/2021 06:02 AM    Glucose (POC) 119 (H) 04/01/2021 09:51 PM    Glucose (POC) 114 (H) 04/01/2021 04:12 PM    Glucose (POC) 124 (H) 04/01/2021 11:34 AM     Lab Results   Component Value Date/Time    Color YELLOW/STRAW 02/27/2022 05:12 AM    Appearance CLEAR 02/27/2022 05:12 AM    Specific gravity 1.009 02/27/2022 05:12 AM    pH (UA) 7.5 02/27/2022 05:12 AM    Protein TRACE (A) 02/27/2022 05:12 AM    Glucose Negative 02/27/2022 05:12 AM    Ketone Negative 02/27/2022 05:12 AM    Bilirubin Negative 02/27/2022 05:12 AM    Urobilinogen 0.2 02/27/2022 05:12 AM    Nitrites Negative 02/27/2022 05:12 AM    Leukocyte Esterase Negative 02/27/2022 05:12 AM    Epithelial cells FEW 02/27/2022 05:12 AM    Bacteria Negative 02/27/2022 05:12 AM    WBC 0-4 02/27/2022 05:12 AM    RBC 5-10 02/27/2022 05:12 AM         Medications Reviewed:     Current Facility-Administered Medications   Medication Dose Route Frequency    [START ON 3/1/2022] Vancomycin random level - 3/1 with AM labs (prior to 0600 dose)   Other ONCE    sodium chloride (NS) flush 5-10 mL  5-10 mL IntraVENous PRN    amLODIPine (NORVASC) tablet 10 mg  10 mg Oral DAILY    aspirin delayed-release tablet 81 mg  81 mg Oral DAILY    docusate sodium (COLACE) capsule 100 mg  100 mg Oral BID PRN    folic acid (FOLVITE) tablet 1 mg  1 mg Oral DAILY    levothyroxine (SYNTHROID) tablet 75 mcg  75 mcg Oral ACB    polyethylene glycol (MIRALAX) packet 17 g  17 g Oral DAILY    sodium chloride (NS) flush 5-40 mL  5-40 mL IntraVENous Q8H    sodium chloride (NS) flush 5-40 mL  5-40 mL IntraVENous PRN    acetaminophen (TYLENOL) tablet 650 mg  650 mg Oral Q6H PRN    Or    acetaminophen (TYLENOL) suppository 650 mg  650 mg Rectal Q6H PRN    polyethylene glycol (MIRALAX) packet 17 g  17 g Oral DAILY PRN    ondansetron (ZOFRAN ODT) tablet 4 mg  4 mg Oral Q8H PRN    Or    ondansetron (ZOFRAN) injection 4 mg  4 mg IntraVENous Q6H PRN    famotidine (PEPCID) tablet 20 mg  20 mg Oral DAILY    vancomycin (VANCOCIN) 500 mg in 0.9% sodium chloride (MBP/ADV) 100 mL MBP  500 mg IntraVENous Q12H    cefTRIAXone (ROCEPHIN) 2 g in 0.9% sodium chloride 20 mL IV syringe  2 g IntraVENous Q24H     ______________________________________________________________________  EXPECTED LENGTH OF STAY: - - -  ACTUAL LENGTH OF STAY:          [address] Garcia MD Full range of motion of upper and lower extremities, no joint tenderness/swelling.

## 2022-11-19 NOTE — H&P ADULT - NSHPSOURCEINFOTX_GEN_ALL_CORE
If you are a smoker, it is important for your health to stop smoking. Please be aware that second hand smoke is also harmful. Wife, 2 daughters (one at bedside, one over the phone)

## 2022-12-01 NOTE — ED PROVIDER NOTE - RESPIRATORY NEGATIVE STATEMENT, MLM
no chest pain, no cough, and no shortness of breath. What Type Of Note Output Would You Prefer (Optional)?: Bullet Format Hpi Title: Evaluation of Skin Lesions How Severe Are Your Spot(S)?: mild Have Your Spot(S) Been Treated In The Past?: has not been treated Additional History: Patient is concerned with a spot on the back of the left side of his neck.

## 2023-01-11 NOTE — H&P ADULT - NSHPSOCIALHISTORY_GEN_ALL_CORE
Returned from [place]
Patient lives at home with his wife, is able to perform his activities of daily living. He is retired, He denies drug use, and states he has not smoked or had alcohol in 30+ years. 10 pack years prior to quitting.

## 2023-01-31 NOTE — DIETITIAN INITIAL EVALUATION ADULT. - POUNDS LOST/GAINED
5 Humira Pregnancy And Lactation Text: This medication is Pregnancy Category B and is considered safe during pregnancy. It is unknown if this medication is excreted in breast milk.

## 2023-04-27 NOTE — ED PROVIDER NOTE - NIH STROKE SCALE: 11. EXTINCTION AND INATTENTION, QM
(0) No abnormality Libtayo Counseling- I discussed with the patient the risks of Libtayo including but not limited to nausea, vomiting, diarrhea, and bone or muscle pain.  The patient verbalized understanding of the proper use and possible adverse effects of Libtayo.  All of the patient's questions and concerns were addressed.

## 2023-05-15 NOTE — ED PROVIDER NOTE - CARDIAC, MLM
Medical Necessity Information: It is in your best interest to select a reason for this procedure from the list below. All of these items fulfill various CMS LCD requirements except lesion extends to a margin. Include Z78.9 (Other Specified Conditions Influencing Health Status) As An Associated Diagnosis?: No Medical Necessity Clause: This procedure was medically necessary because the lesion that was treated was: Lab: [id number] Lab Facility: [id number] Surgeon (Optional): [facility] Surgeon Performing Repair (Optional): [facility] Biopsy Photograph Reviewed: Yes Size Of Lesion In Cm: 1.1 X Size Of Lesion In Cm (Optional): 0 Size Of Margin In Cm: 0.5 Anesthesia Volume In Cc: 12 Eye Clamp Note Details: An eye clamp was used during the procedure. Excision Method: Elliptical Saucerization Depth: dermis and superficial adipose tissue Repair Type: Intermediate Intermediate / Complex Repair - Final Wound Length In Cm: 5.5 Suturegard Retention Suture: 2-0 Nylon Retention Suture Bite Size: 3 mm Length To Time In Minutes Device Was In Place: 10 Number Of Hemigard Strips Per Side: 1 Undermining Type: Entire Wound Debridement Text: The wound edges were debrided prior to proceeding with the closure to facilitate wound healing. Helical Rim Text: The closure involved the helical rim. Vermilion Border Text: The closure involved the vermilion border. Nostril Rim Text: The closure involved the nostril rim. Retention Suture Text: Retention sutures were placed to support the closure and prevent dehiscence. Suture Removal: 11 days Epidermal Closure Graft Donor Site (Optional): simple interrupted Graft Donor Site Bandage (Optional-Leave Blank If You Don't Want In Note): Steri-strips and a pressure bandage were applied to the donor site. Detail Level: Detailed Excision Depth: adipose tissue Scalpel Size: 15 blade Anesthesia Type: 1% lidocaine with epinephrine Additional Anesthesia Volume In Cc: 6 Hemostasis: Electrocautery Estimated Blood Loss (Cc): minimal Deep Sutures: 3-0 Vicryl Number Of Deep Sutures (Optional): 3 Epidermal Sutures: 5-0 Surgipro Epidermal Closure: running horizontal mattress Wound Care: Vaseline Dressing: sterile steri-strips and sterile pressure dressing Normal rate, regular rhythm.  Heart sounds S1, S2. Suturegard Intro: Intraoperative tissue expansion was performed, utilizing the SUTUREGARD device, in order to reduce wound tension. Suturegard Body: The suture ends were repeatedly re-tightened and re-clamped to achieve the desired tissue expansion. Hemigard Intro: Due to skin fragility and wound tension, it was decided to use HEMIGARD adhesive retention suture devices to permit a linear closure. The skin was cleaned and dried for a 6cm distance away from the wound. Excessive hair, if present, was removed to allow for adhesion. Hemigard Postcare Instructions: The HEMIGARD strips are to remain completely dry for at least 5-7 days. Positioning (Leave Blank If You Do Not Want): The patient was placed in a comfortable position exposing the surgical site. Complex Repair Preamble Text (Leave Blank If You Do Not Want): Extensive wide undermining was performed. Intermediate Repair Preamble Text (Leave Blank If You Do Not Want): Undermining was performed with blunt dissection. Fusiform Excision Additional Text (Leave Blank If You Do Not Want): The margin was drawn around the clinically apparent lesion.  A fusiform shape was then drawn on the skin incorporating the lesion and margins.  Incisions were then made along these lines to the appropriate tissue plane and the lesion was extirpated. Eliptical Excision Additional Text (Leave Blank If You Do Not Want): The margin was drawn around the clinically apparent lesion.  An elliptical shape was then drawn on the skin incorporating the lesion and margins.  Incisions were then made along these lines to the appropriate tissue plane and the lesion was extirpated. Saucerization Excision Additional Text (Leave Blank If You Do Not Want): The margin was drawn around the clinically apparent lesion.  Incisions were then made along these lines, in a tangential fashion, to the appropriate tissue plane and the lesion was extirpated. Slit Excision Additional Text (Leave Blank If You Do Not Want): A linear line was drawn on the skin overlying the lesion. An incision was made slowly until the lesion was visualized.  Once visualized, the lesion was removed with blunt dissection. Excisional Biopsy Additional Text (Leave Blank If You Do Not Want): The margin was drawn around the clinically apparent lesion. An elliptical shape was then drawn on the skin incorporating the lesion and margins.  Incisions were then made along these lines to the appropriate tissue plane and the lesion was extirpated. Perilesional Excision Additional Text (Leave Blank If You Do Not Want): The margin was drawn around the clinically apparent lesion. Incisions were then made along these lines to the appropriate tissue plane and the lesion was extirpated. Repair Performed By Another Provider Text (Leave Blank If You Do Not Want): After the tissue was excised the defect was repaired by another provider. No Repair - Repaired With Adjacent Surgical Defect Text (Leave Blank If You Do Not Want): After the excision the defect was repaired concurrently with another surgical defect which was in close approximation. Adjacent Tissue Transfer Text: The defect edges were debeveled with a #15 scalpel blade. Given the location of the defect and the proximity to free margins an adjacent tissue transfer was deemed most appropriate. Using a sterile surgical marker, an appropriate flap was drawn incorporating the defect and placing the expected incisions within the relaxed skin tension lines where possible. The area thus outlined was incised deep to adipose tissue with a #15 scalpel blade. The skin margins were undermined to an appropriate distance in all directions utilizing iris scissors and carried over to close the primary defect. Advancement Flap (Single) Text: The defect edges were debeveled with a #15 scalpel blade.  Given the location of the defect and the proximity to free margins a single advancement flap was deemed most appropriate.  Using a sterile surgical marker, an appropriate advancement flap was drawn incorporating the defect and placing the expected incisions within the relaxed skin tension lines where possible.    The area thus outlined was incised deep to adipose tissue with a #15 scalpel blade.  The skin margins were undermined to an appropriate distance in all directions utilizing iris scissors. Advancement Flap (Double) Text: The defect edges were debeveled with a #15 scalpel blade.  Given the location of the defect and the proximity to free margins a double advancement flap was deemed most appropriate.  Using a sterile surgical marker, the appropriate advancement flaps were drawn incorporating the defect and placing the expected incisions within the relaxed skin tension lines where possible.    The area thus outlined was incised deep to adipose tissue with a #15 scalpel blade.  The skin margins were undermined to an appropriate distance in all directions utilizing iris scissors. Burow's Advancement Flap Text: The defect edges were debeveled with a #15 scalpel blade.  Given the location of the defect and the proximity to free margins a Burow's advancement flap was deemed most appropriate.  Using a sterile surgical marker, the appropriate advancement flap was drawn incorporating the defect and placing the expected incisions within the relaxed skin tension lines where possible.    The area thus outlined was incised deep to adipose tissue with a #15 scalpel blade.  The skin margins were undermined to an appropriate distance in all directions utilizing iris scissors. Chonodrocutaneous Helical Advancement Flap Text: The defect edges were debeveled with a #15 scalpel blade. Given the location of the defect and the proximity to free margins a chondrocutaneous helical advancement flap was deemed most appropriate. Using a sterile surgical marker, the appropriate advancement flap was drawn incorporating the defect and placing the expected incisions within the relaxed skin tension lines where possible. The area thus outlined was incised deep to adipose tissue with a #15 scalpel blade. The skin margins were undermined to an appropriate distance in all directions utilizing iris scissors. Following this, the designed flap was advanced and carried over into the primary defect and sutured into place. Crescentic Advancement Flap Text: The defect edges were debeveled with a #15 scalpel blade.  Given the location of the defect and the proximity to free margins a crescentic advancement flap was deemed most appropriate.  Using a sterile surgical marker, the appropriate advancement flap was drawn incorporating the defect and placing the expected incisions within the relaxed skin tension lines where possible.    The area thus outlined was incised deep to adipose tissue with a #15 scalpel blade.  The skin margins were undermined to an appropriate distance in all directions utilizing iris scissors. A-T Advancement Flap Text: The defect edges were debeveled with a #15 scalpel blade.  Given the location of the defect, shape of the defect and the proximity to free margins an A-T advancement flap was deemed most appropriate.  Using a sterile surgical marker, an appropriate advancement flap was drawn incorporating the defect and placing the expected incisions within the relaxed skin tension lines where possible.    The area thus outlined was incised deep to adipose tissue with a #15 scalpel blade.  The skin margins were undermined to an appropriate distance in all directions utilizing iris scissors. O-T Advancement Flap Text: The defect edges were debeveled with a #15 scalpel blade.  Given the location of the defect, shape of the defect and the proximity to free margins an O-T advancement flap was deemed most appropriate.  Using a sterile surgical marker, an appropriate advancement flap was drawn incorporating the defect and placing the expected incisions within the relaxed skin tension lines where possible.    The area thus outlined was incised deep to adipose tissue with a #15 scalpel blade.  The skin margins were undermined to an appropriate distance in all directions utilizing iris scissors. O-L Flap Text: The defect edges were debeveled with a #15 scalpel blade.  Given the location of the defect, shape of the defect and the proximity to free margins an O-L flap was deemed most appropriate.  Using a sterile surgical marker, an appropriate advancement flap was drawn incorporating the defect and placing the expected incisions within the relaxed skin tension lines where possible.    The area thus outlined was incised deep to adipose tissue with a #15 scalpel blade.  The skin margins were undermined to an appropriate distance in all directions utilizing iris scissors. O-Z Flap Text: The defect edges were debeveled with a #15 scalpel blade.  Given the location of the defect, shape of the defect and the proximity to free margins an O-Z flap was deemed most appropriate.  Using a sterile surgical marker, an appropriate transposition flap was drawn incorporating the defect and placing the expected incisions within the relaxed skin tension lines where possible. The area thus outlined was incised deep to adipose tissue with a #15 scalpel blade.  The skin margins were undermined to an appropriate distance in all directions utilizing iris scissors. Double O-Z Flap Text: The defect edges were debeveled with a #15 scalpel blade.  Given the location of the defect, shape of the defect and the proximity to free margins a Double O-Z flap was deemed most appropriate.  Using a sterile surgical marker, an appropriate transposition flap was drawn incorporating the defect and placing the expected incisions within the relaxed skin tension lines where possible. The area thus outlined was incised deep to adipose tissue with a #15 scalpel blade.  The skin margins were undermined to an appropriate distance in all directions utilizing iris scissors. V-Y Flap Text: The defect edges were debeveled with a #15 scalpel blade.  Given the location of the defect, shape of the defect and the proximity to free margins a V-Y flap was deemed most appropriate.  Using a sterile surgical marker, an appropriate advancement flap was drawn incorporating the defect and placing the expected incisions within the relaxed skin tension lines where possible.    The area thus outlined was incised deep to adipose tissue with a #15 scalpel blade.  The skin margins were undermined to an appropriate distance in all directions utilizing iris scissors. Advancement-Rotation Flap Text: The defect edges were debeveled with a #15 scalpel blade. Given the location of the defect, shape of the defect and the proximity to free margins an advancement-rotation flap was deemed most appropriate. Using a sterile surgical marker, an appropriate flap was drawn incorporating the defect and placing the expected incisions within the relaxed skin tension lines where possible. The area thus outlined was incised deep to adipose tissue with a #15 scalpel blade. The skin margins were undermined to an appropriate distance in all directions utilizing iris scissors. Following this, the designed flap was carried over into the primary defect and sutured into place. Mercedes Flap Text: The defect edges were debeveled with a #15 scalpel blade.  Given the location of the defect, shape of the defect and the proximity to free margins a Mercedes flap was deemed most appropriate.  Using a sterile surgical marker, an appropriate advancement flap was drawn incorporating the defect and placing the expected incisions within the relaxed skin tension lines where possible. The area thus outlined was incised deep to adipose tissue with a #15 scalpel blade.  The skin margins were undermined to an appropriate distance in all directions utilizing iris scissors. Modified Advancement Flap Text: The defect edges were debeveled with a #15 scalpel blade.  Given the location of the defect, shape of the defect and the proximity to free margins a modified advancement flap was deemed most appropriate.  Using a sterile surgical marker, an appropriate advancement flap was drawn incorporating the defect and placing the expected incisions within the relaxed skin tension lines where possible.    The area thus outlined was incised deep to adipose tissue with a #15 scalpel blade.  The skin margins were undermined to an appropriate distance in all directions utilizing iris scissors. Mucosal Advancement Flap Text: Given the location of the defect, shape of the defect and the proximity to free margins a mucosal advancement flap was deemed most appropriate. Incisions were made with a 15 blade scalpel in the appropriate fashion along the cutaneous vermillion border and the mucosal lip. The remaining actinically damaged mucosal tissue was excised.  The mucosal advancement flap was then elevated to the gingival sulcus with care taken to preserve the neurovascular structures and advanced into the primary defect. Care was taken to ensure that precise realignment of the vermillion border was achieved. Peng Advancement Flap Text: The defect edges were debeveled with a #15 scalpel blade. Given the location of the defect, shape of the defect and the proximity to free margins a Peng advancement flap was deemed most appropriate. Using a sterile surgical marker, an appropriate advancement flap was drawn incorporating the defect and placing the expected incisions within the relaxed skin tension lines where possible. The area thus outlined was incised deep to adipose tissue with a #15 scalpel blade. The skin margins were undermined to an appropriate distance in all directions utilizing iris scissors. Following this, the designed flap was advanced and carried over into the primary defect and sutured into place. Hatchet Flap Text: The defect edges were debeveled with a #15 scalpel blade.  Given the location of the defect, shape of the defect and the proximity to free margins a hatchet flap was deemed most appropriate.  Using a sterile surgical marker, an appropriate hatchet flap was drawn incorporating the defect and placing the expected incisions within the relaxed skin tension lines where possible.    The area thus outlined was incised deep to adipose tissue with a #15 scalpel blade.  The skin margins were undermined to an appropriate distance in all directions utilizing iris scissors. Rotation Flap Text: The defect edges were debeveled with a #15 scalpel blade.  Given the location of the defect, shape of the defect and the proximity to free margins a rotation flap was deemed most appropriate.  Using a sterile surgical marker, an appropriate rotation flap was drawn incorporating the defect and placing the expected incisions within the relaxed skin tension lines where possible.    The area thus outlined was incised deep to adipose tissue with a #15 scalpel blade.  The skin margins were undermined to an appropriate distance in all directions utilizing iris scissors. Bilateral Rotation Flap Text: The defect edges were debeveled with a #15 scalpel blade. Given the location of the defect, shape of the defect and the proximity to free margins a bilateral rotation flap was deemed most appropriate. Using a sterile surgical marker, an appropriate rotation flap was drawn incorporating the defect and placing the expected incisions within the relaxed skin tension lines where possible. The area thus outlined was incised deep to adipose tissue with a #15 scalpel blade. The skin margins were undermined to an appropriate distance in all directions utilizing iris scissors. Following this, the designed flap was carried over into the primary defect and sutured into place. Spiral Flap Text: The defect edges were debeveled with a #15 scalpel blade.  Given the location of the defect, shape of the defect and the proximity to free margins a spiral flap was deemed most appropriate.  Using a sterile surgical marker, an appropriate rotation flap was drawn incorporating the defect and placing the expected incisions within the relaxed skin tension lines where possible. The area thus outlined was incised deep to adipose tissue with a #15 scalpel blade.  The skin margins were undermined to an appropriate distance in all directions utilizing iris scissors. Normal rate, regular rhythm.  Heart sounds S1, S2. No gallops or rubs. Staged Advancement Flap Text: The defect edges were debeveled with a #15 scalpel blade. Given the location of the defect, shape of the defect and the proximity to free margins a staged advancement flap was deemed most appropriate. Using a sterile surgical marker, an appropriate advancement flap was drawn incorporating the defect and placing the expected incisions within the relaxed skin tension lines where possible. The area thus outlined was incised deep to adipose tissue with a #15 scalpel blade. The skin margins were undermined to an appropriate distance in all directions utilizing iris scissors. Following this, the designed flap was carried over into the primary defect and sutured into place. Star Wedge Flap Text: The defect edges were debeveled with a #15 scalpel blade.  Given the location of the defect, shape of the defect and the proximity to free margins a star wedge flap was deemed most appropriate.  Using a sterile surgical marker, an appropriate rotation flap was drawn incorporating the defect and placing the expected incisions within the relaxed skin tension lines where possible. The area thus outlined was incised deep to adipose tissue with a #15 scalpel blade.  The skin margins were undermined to an appropriate distance in all directions utilizing iris scissors. Transposition Flap Text: The defect edges were debeveled with a #15 scalpel blade.  Given the location of the defect and the proximity to free margins a transposition flap was deemed most appropriate.  Using a sterile surgical marker, an appropriate transposition flap was drawn incorporating the defect.    The area thus outlined was incised deep to adipose tissue with a #15 scalpel blade.  The skin margins were undermined to an appropriate distance in all directions utilizing iris scissors. Muscle Hinge Flap Text: The defect edges were debeveled with a #15 scalpel blade.  Given the size, depth and location of the defect and the proximity to free margins a muscle hinge flap was deemed most appropriate.  Using a sterile surgical marker, an appropriate hinge flap was drawn incorporating the defect. The area thus outlined was incised with a #15 scalpel blade.  The skin margins were undermined to an appropriate distance in all directions utilizing iris scissors. Mustarde Flap Text: The defect edges were debeveled with a #15 scalpel blade.  Given the size, depth and location of the defect and the proximity to free margins a Mustarde flap was deemed most appropriate. Using a sterile surgical marker, an appropriate flap was drawn incorporating the defect. The area thus outlined was incised with a #15 scalpel blade. The skin margins were undermined to an appropriate distance in all directions utilizing iris scissors. Following this, the designed flap was carried into the primary defect and sutured into place. Nasal Turnover Hinge Flap Text: The defect edges were debeveled with a #15 scalpel blade.  Given the size, depth, location of the defect and the defect being full thickness a nasal turnover hinge flap was deemed most appropriate. Using a sterile surgical marker, an appropriate hinge flap was drawn incorporating the defect. The area thus outlined was incised with a #15 scalpel blade. The flap was designed to recreate the nasal mucosal lining and the alar rim. The skin margins were undermined to an appropriate distance in all directions utilizing iris scissors. Following this, the designed flap was carried over into the primary defect and sutured into place Nasalis-Muscle-Based Myocutaneous Island Pedicle Flap Text: Using a #15 blade, an incision was made around the donor flap to the level of the nasalis muscle. Wide lateral undermining was then performed in both the subcutaneous plane above the nasalis muscle, and in a submuscular plane just above periosteum. This allowed the formation of a free nasalis muscle axial pedicle (based on the angular artery) which was still attached to the actual cutaneous flap, increasing its mobility and vascular viability. Hemostasis was obtained with pinpoint electrocoagulation. The flap was mobilized into position and the pivotal anchor points positioned and stabilized with buried interrupted sutures. Subcutaneous and dermal tissues were closed in a multilayered fashion with sutures. Tissue redundancies were excised, and the epidermal edges were apposed without significant tension and sutured with sutures. Orbicularis Oris Muscle Flap Text: The defect edges were debeveled with a #15 scalpel blade.  Given that the defect affected the competency of the oral sphincter an orbicularis oris muscle flap was deemed most appropriate to restore this competency and normal muscle function.  Using a sterile surgical marker, an appropriate flap was drawn incorporating the defect. The area thus outlined was incised with a #15 scalpel blade. Following this, the designed flap was carried over into the primary defect and sutured into place. Melolabial Transposition Flap Text: The defect edges were debeveled with a #15 scalpel blade.  Given the location of the defect and the proximity to free margins a melolabial flap was deemed most appropriate.  Using a sterile surgical marker, an appropriate melolabial transposition flap was drawn incorporating the defect.    The area thus outlined was incised deep to adipose tissue with a #15 scalpel blade.  The skin margins were undermined to an appropriate distance in all directions utilizing iris scissors. Rhombic Flap Text: The defect edges were debeveled with a #15 scalpel blade.  Given the location of the defect and the proximity to free margins a rhombic flap was deemed most appropriate.  Using a sterile surgical marker, an appropriate rhombic flap was drawn incorporating the defect.    The area thus outlined was incised deep to adipose tissue with a #15 scalpel blade.  The skin margins were undermined to an appropriate distance in all directions utilizing iris scissors. Rhomboid Transposition Flap Text: The defect edges were debeveled with a #15 scalpel blade.  Given the location of the defect and the proximity to free margins a rhomboid transposition flap was deemed most appropriate.  Using a sterile surgical marker, an appropriate rhomboid flap was drawn incorporating the defect.    The area thus outlined was incised deep to adipose tissue with a #15 scalpel blade.  The skin margins were undermined to an appropriate distance in all directions utilizing iris scissors. Bi-Rhombic Flap Text: The defect edges were debeveled with a #15 scalpel blade.  Given the location of the defect and the proximity to free margins a bi-rhombic flap was deemed most appropriate.  Using a sterile surgical marker, an appropriate rhombic flap was drawn incorporating the defect. The area thus outlined was incised deep to adipose tissue with a #15 scalpel blade.  The skin margins were undermined to an appropriate distance in all directions utilizing iris scissors. Helical Rim Advancement Flap Text: The defect edges were debeveled with a #15 blade scalpel.  Given the location of the defect and the proximity to free margins (helical rim) a double helical rim advancement flap was deemed most appropriate.  Using a sterile surgical marker, the appropriate advancement flaps were drawn incorporating the defect and placing the expected incisions between the helical rim and antihelix where possible.  The area thus outlined was incised through and through with a #15 scalpel blade.  With a skin hook and iris scissors, the flaps were gently and sharply undermined and freed up. Bilateral Helical Rim Advancement Flap Text: The defect edges were debeveled with a #15 blade scalpel.  Given the location of the defect and the proximity to free margins (helical rim) a bilateral helical rim advancement flap was deemed most appropriate.  Using a sterile surgical marker, the appropriate advancement flaps were drawn incorporating the defect and placing the expected incisions between the helical rim and antihelix where possible.  The area thus outlined was incised through and through with a #15 scalpel blade.  With a skin hook and iris scissors, the flaps were gently and sharply undermined and freed up. Ear Star Wedge Flap Text: The defect edges were debeveled with a #15 blade scalpel.  Given the location of the defect and the proximity to free margins (helical rim) an ear star wedge flap was deemed most appropriate.  Using a sterile surgical marker, the appropriate flap was drawn incorporating the defect and placing the expected incisions between the helical rim and antihelix where possible.  The area thus outlined was incised through and through with a #15 scalpel blade. Banner Transposition Flap Text: The defect edges were debeveled with a #15 scalpel blade.  Given the location of the defect and the proximity to free margins a Banner transposition flap was deemed most appropriate.  Using a sterile surgical marker, an appropriate flap drawn around the defect. The area thus outlined was incised deep to adipose tissue with a #15 scalpel blade.  The skin margins were undermined to an appropriate distance in all directions utilizing iris scissors. Bilobed Flap Text: The defect edges were debeveled with a #15 scalpel blade.  Given the location of the defect and the proximity to free margins a bilobe flap was deemed most appropriate.  Using a sterile surgical marker, an appropriate bilobe flap drawn around the defect.    The area thus outlined was incised deep to adipose tissue with a #15 scalpel blade.  The skin margins were undermined to an appropriate distance in all directions utilizing iris scissors. Bilobed Transposition Flap Text: The defect edges were debeveled with a #15 scalpel blade.  Given the location of the defect and the proximity to free margins a bilobed transposition flap was deemed most appropriate.  Using a sterile surgical marker, an appropriate bilobe flap drawn around the defect.    The area thus outlined was incised deep to adipose tissue with a #15 scalpel blade.  The skin margins were undermined to an appropriate distance in all directions utilizing iris scissors. Trilobed Flap Text: The defect edges were debeveled with a #15 scalpel blade.  Given the location of the defect and the proximity to free margins a trilobed flap was deemed most appropriate.  Using a sterile surgical marker, an appropriate trilobed flap drawn around the defect.    The area thus outlined was incised deep to adipose tissue with a #15 scalpel blade.  The skin margins were undermined to an appropriate distance in all directions utilizing iris scissors. Dorsal Nasal Flap Text: The defect edges were debeveled with a #15 scalpel blade.  Given the location of the defect and the proximity to free margins a dorsal nasal flap was deemed most appropriate.  Using a sterile surgical marker, an appropriate dorsal nasal flap was drawn around the defect.    The area thus outlined was incised deep to adipose tissue with a #15 scalpel blade.  The skin margins were undermined to an appropriate distance in all directions utilizing iris scissors. Island Pedicle Flap Text: The defect edges were debeveled with a #15 scalpel blade.  Given the location of the defect, shape of the defect and the proximity to free margins an island pedicle advancement flap was deemed most appropriate.  Using a sterile surgical marker, an appropriate advancement flap was drawn incorporating the defect, outlining the appropriate donor tissue and placing the expected incisions within the relaxed skin tension lines where possible.    The area thus outlined was incised deep to adipose tissue with a #15 scalpel blade.  The skin margins were undermined to an appropriate distance in all directions around the primary defect and laterally outward around the island pedicle utilizing iris scissors.  There was minimal undermining beneath the pedicle flap. Island Pedicle Flap With Canthal Suspension Text: The defect edges were debeveled with a #15 scalpel blade.  Given the location of the defect, shape of the defect and the proximity to free margins an island pedicle advancement flap was deemed most appropriate.  Using a sterile surgical marker, an appropriate advancement flap was drawn incorporating the defect, outlining the appropriate donor tissue and placing the expected incisions within the relaxed skin tension lines where possible. The area thus outlined was incised deep to adipose tissue with a #15 scalpel blade.  The skin margins were undermined to an appropriate distance in all directions around the primary defect and laterally outward around the island pedicle utilizing iris scissors.  There was minimal undermining beneath the pedicle flap. A suspension suture was placed in the canthal tendon to prevent tension and prevent ectropion. Alar Island Pedicle Flap Text: The defect edges were debeveled with a #15 scalpel blade.  Given the location of the defect, shape of the defect and the proximity to the alar rim an island pedicle advancement flap was deemed most appropriate.  Using a sterile surgical marker, an appropriate advancement flap was drawn incorporating the defect, outlining the appropriate donor tissue and placing the expected incisions within the nasal ala running parallel to the alar rim. The area thus outlined was incised with a #15 scalpel blade.  The skin margins were undermined minimally to an appropriate distance in all directions around the primary defect and laterally outward around the island pedicle utilizing iris scissors.  There was minimal undermining beneath the pedicle flap. Double Island Pedicle Flap Text: The defect edges were debeveled with a #15 scalpel blade.  Given the location of the defect, shape of the defect and the proximity to free margins a double island pedicle advancement flap was deemed most appropriate.  Using a sterile surgical marker, an appropriate advancement flap was drawn incorporating the defect, outlining the appropriate donor tissue and placing the expected incisions within the relaxed skin tension lines where possible.    The area thus outlined was incised deep to adipose tissue with a #15 scalpel blade.  The skin margins were undermined to an appropriate distance in all directions around the primary defect and laterally outward around the island pedicle utilizing iris scissors.  There was minimal undermining beneath the pedicle flap. Island Pedicle Flap-Requiring Vessel Identification Text: The defect edges were debeveled with a #15 scalpel blade.  Given the location of the defect, shape of the defect and the proximity to free margins an island pedicle advancement flap was deemed most appropriate.  Using a sterile surgical marker, an appropriate advancement flap was drawn, based on the axial vessel mentioned above, incorporating the defect, outlining the appropriate donor tissue and placing the expected incisions within the relaxed skin tension lines where possible.    The area thus outlined was incised deep to adipose tissue with a #15 scalpel blade.  The skin margins were undermined to an appropriate distance in all directions around the primary defect and laterally outward around the island pedicle utilizing iris scissors.  There was minimal undermining beneath the pedicle flap. Keystone Flap Text: The defect edges were debeveled with a #15 scalpel blade.  Given the location of the defect, shape of the defect a keystone flap was deemed most appropriate.  Using a sterile surgical marker, an appropriate keystone flap was drawn incorporating the defect, outlining the appropriate donor tissue and placing the expected incisions within the relaxed skin tension lines where possible. The area thus outlined was incised deep to adipose tissue with a #15 scalpel blade.  The skin margins were undermined to an appropriate distance in all directions around the primary defect and laterally outward around the flap utilizing iris scissors. O-T Plasty Text: The defect edges were debeveled with a #15 scalpel blade.  Given the location of the defect, shape of the defect and the proximity to free margins an O-T plasty was deemed most appropriate.  Using a sterile surgical marker, an appropriate O-T plasty was drawn incorporating the defect and placing the expected incisions within the relaxed skin tension lines where possible.    The area thus outlined was incised deep to adipose tissue with a #15 scalpel blade.  The skin margins were undermined to an appropriate distance in all directions utilizing iris scissors. O-Z Plasty Text: The defect edges were debeveled with a #15 scalpel blade.  Given the location of the defect, shape of the defect and the proximity to free margins an O-Z plasty (double transposition flap) was deemed most appropriate.  Using a sterile surgical marker, the appropriate transposition flaps were drawn incorporating the defect and placing the expected incisions within the relaxed skin tension lines where possible.    The area thus outlined was incised deep to adipose tissue with a #15 scalpel blade.  The skin margins were undermined to an appropriate distance in all directions utilizing iris scissors.  Hemostasis was achieved with electrocautery.  The flaps were then transposed into place, one clockwise and the other counterclockwise, and anchored with interrupted buried subcutaneous sutures. Double O-Z Plasty Text: The defect edges were debeveled with a #15 scalpel blade.  Given the location of the defect, shape of the defect and the proximity to free margins a Double O-Z plasty (double transposition flap) was deemed most appropriate.  Using a sterile surgical marker, the appropriate transposition flaps were drawn incorporating the defect and placing the expected incisions within the relaxed skin tension lines where possible. The area thus outlined was incised deep to adipose tissue with a #15 scalpel blade.  The skin margins were undermined to an appropriate distance in all directions utilizing iris scissors.  Hemostasis was achieved with electrocautery.  The flaps were then transposed into place, one clockwise and the other counterclockwise, and anchored with interrupted buried subcutaneous sutures. V-Y Plasty Text: The defect edges were debeveled with a #15 scalpel blade.  Given the location of the defect, shape of the defect and the proximity to free margins an V-Y advancement flap was deemed most appropriate.  Using a sterile surgical marker, an appropriate advancement flap was drawn incorporating the defect and placing the expected incisions within the relaxed skin tension lines where possible.    The area thus outlined was incised deep to adipose tissue with a #15 scalpel blade.  The skin margins were undermined to an appropriate distance in all directions utilizing iris scissors. H Plasty Text: Given the location of the defect, shape of the defect and the proximity to free margins a H-plasty was deemed most appropriate for repair.  Using a sterile surgical marker, the appropriate advancement arms of the H-plasty were drawn incorporating the defect and placing the expected incisions within the relaxed skin tension lines where possible. The area thus outlined was incised deep to adipose tissue with a #15 scalpel blade. The skin margins were undermined to an appropriate distance in all directions utilizing iris scissors.  The opposing advancement arms were then advanced into place in opposite direction and anchored with interrupted buried subcutaneous sutures. W Plasty Text: The lesion was extirpated to the level of the fat with a #15 scalpel blade.  Given the location of the defect, shape of the defect and the proximity to free margins a W-plasty was deemed most appropriate for repair.  Using a sterile surgical marker, the appropriate transposition arms of the W-plasty were drawn incorporating the defect and placing the expected incisions within the relaxed skin tension lines where possible.    The area thus outlined was incised deep to adipose tissue with a #15 scalpel blade.  The skin margins were undermined to an appropriate distance in all directions utilizing iris scissors.  The opposing transposition arms were then transposed into place in opposite direction and anchored with interrupted buried subcutaneous sutures. Z Plasty Text: The lesion was extirpated to the level of the fat with a #15 scalpel blade.  Given the location of the defect, shape of the defect and the proximity to free margins a Z-plasty was deemed most appropriate for repair.  Using a sterile surgical marker, the appropriate transposition arms of the Z-plasty were drawn incorporating the defect and placing the expected incisions within the relaxed skin tension lines where possible.    The area thus outlined was incised deep to adipose tissue with a #15 scalpel blade.  The skin margins were undermined to an appropriate distance in all directions utilizing iris scissors.  The opposing transposition arms were then transposed into place in opposite direction and anchored with interrupted buried subcutaneous sutures. Double Z Plasty Text: The lesion was extirpated to the level of the fat with a #15 scalpel blade. Given the location of the defect, shape of the defect and the proximity to free margins a double Z-plasty was deemed most appropriate for repair. Using a sterile surgical marker, the appropriate transposition arms of the double Z-plasty were drawn incorporating the defect and placing the expected incisions within the relaxed skin tension lines where possible. The area thus outlined was incised deep to adipose tissue with a #15 scalpel blade. The skin margins were undermined to an appropriate distance in all directions utilizing iris scissors. The opposing transposition arms were then transposed and carried over into place in opposite direction and anchored with interrupted buried subcutaneous sutures. Zygomaticofacial Flap Text: Given the location of the defect, shape of the defect and the proximity to free margins a zygomaticofacial flap was deemed most appropriate for repair. Using a sterile surgical marker, the appropriate flap was drawn incorporating the defect and placing the expected incisions within the relaxed skin tension lines where possible. The area thus outlined was incised deep to adipose tissue with a #15 scalpel blade with preservation of a vascular pedicle.  The skin margins were undermined to an appropriate distance in all directions utilizing iris scissors. The flap was then carried over into the defect and anchored with interrupted buried subcutaneous sutures. Cheek Interpolation Flap Text: A decision was made to reconstruct the defect utilizing an interpolation axial flap and a staged reconstruction.  A telfa template was made of the defect.  This telfa template was then used to outline the Cheek Interpolation flap.  The donor area for the pedicle flap was then injected with anesthesia.  The flap was excised through the skin and subcutaneous tissue down to the layer of the underlying musculature.  The interpolation flap was carefully excised within this deep plane to maintain its blood supply.  The edges of the donor site were undermined.   The donor site was closed in a primary fashion.  The pedicle was then rotated into position and sutured.  Once the tube was sutured into place, adequate blood supply was confirmed with blanching and refill.  The pedicle was then wrapped with xeroform gauze and dressed appropriately with a telfa and gauze bandage to ensure continued blood supply and protect the attached pedicle. Cheek-To-Nose Interpolation Flap Text: A decision was made to reconstruct the defect utilizing an interpolation axial flap and a staged reconstruction.  A telfa template was made of the defect.  This telfa template was then used to outline the Cheek-To-Nose Interpolation flap.  The donor area for the pedicle flap was then injected with anesthesia.  The flap was excised through the skin and subcutaneous tissue down to the layer of the underlying musculature.  The interpolation flap was carefully excised within this deep plane to maintain its blood supply.  The edges of the donor site were undermined.   The donor site was closed in a primary fashion.  The pedicle was then rotated into position and sutured.  Once the tube was sutured into place, adequate blood supply was confirmed with blanching and refill.  The pedicle was then wrapped with xeroform gauze and dressed appropriately with a telfa and gauze bandage to ensure continued blood supply and protect the attached pedicle. Interpolation Flap Text: A decision was made to reconstruct the defect utilizing an interpolation axial flap and a staged reconstruction.  A telfa template was made of the defect.  This telfa template was then used to outline the interpolation flap.  The donor area for the pedicle flap was then injected with anesthesia.  The flap was excised through the skin and subcutaneous tissue down to the layer of the underlying musculature.  The interpolation flap was carefully excised within this deep plane to maintain its blood supply.  The edges of the donor site were undermined.   The donor site was closed in a primary fashion.  The pedicle was then rotated into position and sutured.  Once the tube was sutured into place, adequate blood supply was confirmed with blanching and refill.  The pedicle was then wrapped with xeroform gauze and dressed appropriately with a telfa and gauze bandage to ensure continued blood supply and protect the attached pedicle. Melolabial Interpolation Flap Text: A decision was made to reconstruct the defect utilizing an interpolation axial flap and a staged reconstruction.  A telfa template was made of the defect.  This telfa template was then used to outline the melolabial interpolation flap.  The donor area for the pedicle flap was then injected with anesthesia.  The flap was excised through the skin and subcutaneous tissue down to the layer of the underlying musculature.  The pedicle flap was carefully excised within this deep plane to maintain its blood supply.  The edges of the donor site were undermined.   The donor site was closed in a primary fashion.  The pedicle was then rotated into position and sutured.  Once the tube was sutured into place, adequate blood supply was confirmed with blanching and refill.  The pedicle was then wrapped with xeroform gauze and dressed appropriately with a telfa and gauze bandage to ensure continued blood supply and protect the attached pedicle. Mastoid Interpolation Flap Text: A decision was made to reconstruct the defect utilizing an interpolation axial flap and a staged reconstruction.  A telfa template was made of the defect.  This telfa template was then used to outline the mastoid interpolation flap.  The donor area for the pedicle flap was then injected with anesthesia.  The flap was excised through the skin and subcutaneous tissue down to the layer of the underlying musculature.  The pedicle flap was carefully excised within this deep plane to maintain its blood supply.  The edges of the donor site were undermined.   The donor site was closed in a primary fashion.  The pedicle was then rotated into position and sutured.  Once the tube was sutured into place, adequate blood supply was confirmed with blanching and refill.  The pedicle was then wrapped with xeroform gauze and dressed appropriately with a telfa and gauze bandage to ensure continued blood supply and protect the attached pedicle. Posterior Auricular Interpolation Flap Text: A decision was made to reconstruct the defect utilizing an interpolation axial flap and a staged reconstruction.  A telfa template was made of the defect.  This telfa template was then used to outline the posterior auricular interpolation flap.  The donor area for the pedicle flap was then injected with anesthesia.  The flap was excised through the skin and subcutaneous tissue down to the layer of the underlying musculature.  The pedicle flap was carefully excised within this deep plane to maintain its blood supply.  The edges of the donor site were undermined.   The donor site was closed in a primary fashion.  The pedicle was then rotated into position and sutured.  Once the tube was sutured into place, adequate blood supply was confirmed with blanching and refill.  The pedicle was then wrapped with xeroform gauze and dressed appropriately with a telfa and gauze bandage to ensure continued blood supply and protect the attached pedicle. Paramedian Forehead Flap Text: A decision was made to reconstruct the defect utilizing an interpolation axial flap and a staged reconstruction.  A telfa template was made of the defect.  This telfa template was then used to outline the paramedian forehead pedicle flap.  The donor area for the pedicle flap was then injected with anesthesia.  The flap was excised through the skin and subcutaneous tissue down to the layer of the underlying musculature.  The pedicle flap was carefully excised within this deep plane to maintain its blood supply.  The edges of the donor site were undermined.   The donor site was closed in a primary fashion.  The pedicle was then rotated into position and sutured.  Once the tube was sutured into place, adequate blood supply was confirmed with blanching and refill.  The pedicle was then wrapped with xeroform gauze and dressed appropriately with a telfa and gauze bandage to ensure continued blood supply and protect the attached pedicle. Abbe Flap (Upper To Lower Lip) Text: The defect of the lower lip was assessed and measured.  Given the location and size of the defect, an Abbe flap was deemed most appropriate. Using a sterile surgical marker, an appropriate Abbe flap was measured and drawn on the upper lip. Local anesthesia was then infiltrated.  A scalpel was then used to incise the upper lip through and through the skin, vermilion, muscle and mucosa, leaving the flap pedicled on the opposite side.  The flap was then rotated and transferred to the lower lip defect.  The flap was then sutured into place with a three layer technique, closing the orbicularis oris muscle layer with subcutaneous buried sutures, followed by a mucosal layer and an epidermal layer. Abbe Flap (Lower To Upper Lip) Text: The defect of the upper lip was assessed and measured.  Given the location and size of the defect, an Abbe flap was deemed most appropriate. Using a sterile surgical marker, an appropriate Abbe flap was measured and drawn on the lower lip. Local anesthesia was then infiltrated. A scalpel was then used to incise the upper lip through and through the skin, vermilion, muscle and mucosa, leaving the flap pedicled on the opposite side.  The flap was then rotated and transferred to the lower lip defect.  The flap was then sutured into place with a three layer technique, closing the orbicularis oris muscle layer with subcutaneous buried sutures, followed by a mucosal layer and an epidermal layer. Estlander Flap (Upper To Lower Lip) Text: The defect of the lower lip was assessed and measured.  Given the location and size of the defect, an Estlander flap was deemed most appropriate. Using a sterile surgical marker, an appropriate Estlander flap was measured and drawn on the upper lip. Local anesthesia was then infiltrated. A scalpel was then used to incise the lateral aspect of the flap, through skin, muscle and mucosa, leaving the flap pedicled medially.  The flap was then rotated and positioned to fill the lower lip defect.  The flap was then sutured into place with a three layer technique, closing the orbicularis oris muscle layer with subcutaneous buried sutures, followed by a mucosal layer and an epidermal layer. Estlander Flap (Lower To Upper Lip) Text: The defect of the lower lip was assessed and measured.  Given the location and size of the defect, an Estlander flap was deemed most appropriate. Using a sterile surgical marker, an appropriate Estlander flap was measured and drawn on the upper lip. Local anesthesia was then infiltrated. A scalpel was then used to incise the lateral aspect of the flap, through skin, muscle and mucosa, leaving the flap pedicled medially.  The flap was then rotated and positioned to fill the lower lip defect.  The flap was then sutured into place with a three layer technique, closing the orbicularis oris muscle layer with subcutaneous buried sutures, followed by a mucosal layer and an epidermal layer. Lip Wedge Excision Repair Text: Given the location of the defect and the proximity to free margins a full thickness wedge repair was deemed most appropriate.  Using a sterile surgical marker, the appropriate repair was drawn incorporating the defect and placing the expected incisions perpendicular to the vermillion border.  The vermillion border was also meticulously outlined to ensure appropriate reapproximation during the repair.  The area thus outlined was incised through and through with a #15 scalpel blade.  The muscularis and dermis were reaproximated with deep sutures following hemostasis. Care was taken to realign the vermillion border before proceeding with the superficial closure.  Once the vermillion was realigned the superfical and mucosal closure was finished. Ftsg Text: The defect edges were debeveled with a #15 scalpel blade.  Given the location of the defect, shape of the defect and the proximity to free margins a full thickness skin graft was deemed most appropriate.  Using a sterile surgical marker, the primary defect shape was transferred to the donor site. The area thus outlined was incised deep to adipose tissue with a #15 scalpel blade.  The harvested graft was then trimmed of adipose tissue until only dermis and epidermis was left.  The skin margins of the secondary defect were undermined to an appropriate distance in all directions utilizing iris scissors.  The secondary defect was closed with interrupted buried subcutaneous sutures.  The skin edges were then re-apposed with running  sutures.  The skin graft was then placed in the primary defect and oriented appropriately. Split-Thickness Skin Graft Text: The defect edges were debeveled with a #15 scalpel blade.  Given the location of the defect, shape of the defect and the proximity to free margins a split thickness skin graft was deemed most appropriate.  Using a sterile surgical marker, the primary defect shape was transferred to the donor site. The split thickness graft was then harvested.  The skin graft was then placed in the primary defect and oriented appropriately. Burow's Graft Text: The defect edges were debeveled with a #15 scalpel blade. Given the location of the defect, shape of the defect, the proximity to free margins and the presence of a standing cone deformity a Burow's skin graft was deemed most appropriate. The standing cone was removed and this tissue was then trimmed to the shape of the primary defect. The adipose tissue was also removed until only dermis and epidermis were left.  The skin margins of the secondary defect were undermined to an appropriate distance in all directions utilizing iris scissors.  The secondary defect was closed with interrupted buried subcutaneous sutures.  The skin edges were then re-apposed with running  sutures.  The skin graft was then placed in the primary defect and oriented appropriately. Cartilage Graft Text: The defect edges were debeveled with a #15 scalpel blade.  Given the location of the defect, shape of the defect, the fact the defect involved a full thickness cartilage defect a cartilage graft was deemed most appropriate.  An appropriate donor site was identified, cleansed, and anesthetized. The cartilage graft was then harvested and transferred to the recipient site, oriented appropriately and then sutured into place.  The secondary defect was then repaired using a primary closure. Composite Graft Text: The defect edges were debeveled with a #15 scalpel blade.  Given the location of the defect, shape of the defect, the proximity to free margins and the fact the defect was full thickness a composite graft was deemed most appropriate.  The defect was outline and then transferred to the donor site.  A full thickness graft was then excised from the donor site. The graft was then placed in the primary defect, oriented appropriately and then sutured into place.  The secondary defect was then repaired using a primary closure. Epidermal Autograft Text: The defect edges were debeveled with a #15 scalpel blade.  Given the location of the defect, shape of the defect and the proximity to free margins an epidermal autograft was deemed most appropriate.  Using a sterile surgical marker, the primary defect shape was transferred to the donor site. The epidermal graft was then harvested.  The skin graft was then placed in the primary defect and oriented appropriately. Dermal Autograft Text: The defect edges were debeveled with a #15 scalpel blade.  Given the location of the defect, shape of the defect and the proximity to free margins a dermal autograft was deemed most appropriate.  Using a sterile surgical marker, the primary defect shape was transferred to the donor site. The area thus outlined was incised deep to adipose tissue with a #15 scalpel blade.  The harvested graft was then trimmed of adipose and epidermal tissue until only dermis was left.  The skin graft was then placed in the primary defect and oriented appropriately. Skin Substitute Text: The defect edges were debeveled with a #15 scalpel blade.  Given the location of the defect, shape of the defect and the proximity to free margins a skin substitute graft was deemed most appropriate.  The graft material was trimmed to fit the size of the defect. The graft was then placed in the primary defect and oriented appropriately. Tissue Cultured Epidermal Autograft Text: The defect edges were debeveled with a #15 scalpel blade.  Given the location of the defect, shape of the defect and the proximity to free margins a tissue cultured epidermal autograft was deemed most appropriate.  The graft was then trimmed to fit the size of the defect.  The graft was then placed in the primary defect and oriented appropriately. Xenograft Text: The defect edges were debeveled with a #15 scalpel blade.  Given the location of the defect, shape of the defect and the proximity to free margins a xenograft was deemed most appropriate.  The graft was then trimmed to fit the size of the defect.  The graft was then placed in the primary defect and oriented appropriately. Purse String (Intermediate) Text: Given the location of the defect and the characteristics of the surrounding skin a pursestring intermediate closure was deemed most appropriate.  Undermining was performed circumfirentially around the surgical defect.  A purstring suture was then placed and tightened. Purse String (Simple) Text: Given the location of the defect and the characteristics of the surrounding skin a purse string simple closure was deemed most appropriate.  Undermining was performed circumferentially around the surgical defect.  A purse string suture was then placed and tightened. Complex Repair And Single Advancement Flap Text: The defect edges were debeveled with a #15 scalpel blade.  The primary defect was closed partially with a complex linear closure.  Given the location of the remaining defect, shape of the defect and the proximity to free margins a single advancement flap was deemed most appropriate for complete closure of the defect.  Using a sterile surgical marker, an appropriate advancement flap was drawn incorporating the defect and placing the expected incisions within the relaxed skin tension lines where possible.    The area thus outlined was incised deep to adipose tissue with a #15 scalpel blade.  The skin margins were undermined to an appropriate distance in all directions utilizing iris scissors. Complex Repair And Double Advancement Flap Text: The defect edges were debeveled with a #15 scalpel blade.  The primary defect was closed partially with a complex linear closure.  Given the location of the remaining defect, shape of the defect and the proximity to free margins a double advancement flap was deemed most appropriate for complete closure of the defect.  Using a sterile surgical marker, an appropriate advancement flap was drawn incorporating the defect and placing the expected incisions within the relaxed skin tension lines where possible.    The area thus outlined was incised deep to adipose tissue with a #15 scalpel blade.  The skin margins were undermined to an appropriate distance in all directions utilizing iris scissors. Complex Repair And Modified Advancement Flap Text: The defect edges were debeveled with a #15 scalpel blade.  The primary defect was closed partially with a complex linear closure.  Given the location of the remaining defect, shape of the defect and the proximity to free margins a modified advancement flap was deemed most appropriate for complete closure of the defect.  Using a sterile surgical marker, an appropriate advancement flap was drawn incorporating the defect and placing the expected incisions within the relaxed skin tension lines where possible.    The area thus outlined was incised deep to adipose tissue with a #15 scalpel blade.  The skin margins were undermined to an appropriate distance in all directions utilizing iris scissors. Complex Repair And A-T Advancement Flap Text: The defect edges were debeveled with a #15 scalpel blade.  The primary defect was closed partially with a complex linear closure.  Given the location of the remaining defect, shape of the defect and the proximity to free margins an A-T advancement flap was deemed most appropriate for complete closure of the defect.  Using a sterile surgical marker, an appropriate advancement flap was drawn incorporating the defect and placing the expected incisions within the relaxed skin tension lines where possible.    The area thus outlined was incised deep to adipose tissue with a #15 scalpel blade.  The skin margins were undermined to an appropriate distance in all directions utilizing iris scissors. Complex Repair And O-T Advancement Flap Text: The defect edges were debeveled with a #15 scalpel blade.  The primary defect was closed partially with a complex linear closure.  Given the location of the remaining defect, shape of the defect and the proximity to free margins an O-T advancement flap was deemed most appropriate for complete closure of the defect.  Using a sterile surgical marker, an appropriate advancement flap was drawn incorporating the defect and placing the expected incisions within the relaxed skin tension lines where possible.    The area thus outlined was incised deep to adipose tissue with a #15 scalpel blade.  The skin margins were undermined to an appropriate distance in all directions utilizing iris scissors. Complex Repair And O-L Flap Text: The defect edges were debeveled with a #15 scalpel blade.  The primary defect was closed partially with a complex linear closure.  Given the location of the remaining defect, shape of the defect and the proximity to free margins an O-L flap was deemed most appropriate for complete closure of the defect.  Using a sterile surgical marker, an appropriate flap was drawn incorporating the defect and placing the expected incisions within the relaxed skin tension lines where possible.    The area thus outlined was incised deep to adipose tissue with a #15 scalpel blade.  The skin margins were undermined to an appropriate distance in all directions utilizing iris scissors. Complex Repair And Bilobe Flap Text: The defect edges were debeveled with a #15 scalpel blade.  The primary defect was closed partially with a complex linear closure.  Given the location of the remaining defect, shape of the defect and the proximity to free margins a bilobe flap was deemed most appropriate for complete closure of the defect.  Using a sterile surgical marker, an appropriate advancement flap was drawn incorporating the defect and placing the expected incisions within the relaxed skin tension lines where possible.    The area thus outlined was incised deep to adipose tissue with a #15 scalpel blade.  The skin margins were undermined to an appropriate distance in all directions utilizing iris scissors. Complex Repair And Melolabial Flap Text: The defect edges were debeveled with a #15 scalpel blade.  The primary defect was closed partially with a complex linear closure.  Given the location of the remaining defect, shape of the defect and the proximity to free margins a melolabial flap was deemed most appropriate for complete closure of the defect.  Using a sterile surgical marker, an appropriate advancement flap was drawn incorporating the defect and placing the expected incisions within the relaxed skin tension lines where possible.    The area thus outlined was incised deep to adipose tissue with a #15 scalpel blade.  The skin margins were undermined to an appropriate distance in all directions utilizing iris scissors. Complex Repair And Rotation Flap Text: The defect edges were debeveled with a #15 scalpel blade.  The primary defect was closed partially with a complex linear closure.  Given the location of the remaining defect, shape of the defect and the proximity to free margins a rotation flap was deemed most appropriate for complete closure of the defect.  Using a sterile surgical marker, an appropriate advancement flap was drawn incorporating the defect and placing the expected incisions within the relaxed skin tension lines where possible.    The area thus outlined was incised deep to adipose tissue with a #15 scalpel blade.  The skin margins were undermined to an appropriate distance in all directions utilizing iris scissors. Complex Repair And Rhombic Flap Text: The defect edges were debeveled with a #15 scalpel blade.  The primary defect was closed partially with a complex linear closure.  Given the location of the remaining defect, shape of the defect and the proximity to free margins a rhombic flap was deemed most appropriate for complete closure of the defect.  Using a sterile surgical marker, an appropriate advancement flap was drawn incorporating the defect and placing the expected incisions within the relaxed skin tension lines where possible.    The area thus outlined was incised deep to adipose tissue with a #15 scalpel blade.  The skin margins were undermined to an appropriate distance in all directions utilizing iris scissors. Complex Repair And Transposition Flap Text: The defect edges were debeveled with a #15 scalpel blade.  The primary defect was closed partially with a complex linear closure.  Given the location of the remaining defect, shape of the defect and the proximity to free margins a transposition flap was deemed most appropriate for complete closure of the defect.  Using a sterile surgical marker, an appropriate advancement flap was drawn incorporating the defect and placing the expected incisions within the relaxed skin tension lines where possible.    The area thus outlined was incised deep to adipose tissue with a #15 scalpel blade.  The skin margins were undermined to an appropriate distance in all directions utilizing iris scissors. Complex Repair And V-Y Plasty Text: The defect edges were debeveled with a #15 scalpel blade.  The primary defect was closed partially with a complex linear closure.  Given the location of the remaining defect, shape of the defect and the proximity to free margins a V-Y plasty was deemed most appropriate for complete closure of the defect.  Using a sterile surgical marker, an appropriate advancement flap was drawn incorporating the defect and placing the expected incisions within the relaxed skin tension lines where possible.    The area thus outlined was incised deep to adipose tissue with a #15 scalpel blade.  The skin margins were undermined to an appropriate distance in all directions utilizing iris scissors. Complex Repair And M Plasty Text: The defect edges were debeveled with a #15 scalpel blade.  The primary defect was closed partially with a complex linear closure.  Given the location of the remaining defect, shape of the defect and the proximity to free margins an M plasty was deemed most appropriate for complete closure of the defect.  Using a sterile surgical marker, an appropriate advancement flap was drawn incorporating the defect and placing the expected incisions within the relaxed skin tension lines where possible.    The area thus outlined was incised deep to adipose tissue with a #15 scalpel blade.  The skin margins were undermined to an appropriate distance in all directions utilizing iris scissors. Complex Repair And Double M Plasty Text: The defect edges were debeveled with a #15 scalpel blade.  The primary defect was closed partially with a complex linear closure.  Given the location of the remaining defect, shape of the defect and the proximity to free margins a double M plasty was deemed most appropriate for complete closure of the defect.  Using a sterile surgical marker, an appropriate advancement flap was drawn incorporating the defect and placing the expected incisions within the relaxed skin tension lines where possible.    The area thus outlined was incised deep to adipose tissue with a #15 scalpel blade.  The skin margins were undermined to an appropriate distance in all directions utilizing iris scissors. Complex Repair And W Plasty Text: The defect edges were debeveled with a #15 scalpel blade.  The primary defect was closed partially with a complex linear closure.  Given the location of the remaining defect, shape of the defect and the proximity to free margins a W plasty was deemed most appropriate for complete closure of the defect.  Using a sterile surgical marker, an appropriate advancement flap was drawn incorporating the defect and placing the expected incisions within the relaxed skin tension lines where possible.    The area thus outlined was incised deep to adipose tissue with a #15 scalpel blade.  The skin margins were undermined to an appropriate distance in all directions utilizing iris scissors. Complex Repair And Z Plasty Text: The defect edges were debeveled with a #15 scalpel blade.  The primary defect was closed partially with a complex linear closure.  Given the location of the remaining defect, shape of the defect and the proximity to free margins a Z plasty was deemed most appropriate for complete closure of the defect.  Using a sterile surgical marker, an appropriate advancement flap was drawn incorporating the defect and placing the expected incisions within the relaxed skin tension lines where possible.    The area thus outlined was incised deep to adipose tissue with a #15 scalpel blade.  The skin margins were undermined to an appropriate distance in all directions utilizing iris scissors. Complex Repair And Dorsal Nasal Flap Text: The defect edges were debeveled with a #15 scalpel blade.  The primary defect was closed partially with a complex linear closure.  Given the location of the remaining defect, shape of the defect and the proximity to free margins a dorsal nasal flap was deemed most appropriate for complete closure of the defect.  Using a sterile surgical marker, an appropriate flap was drawn incorporating the defect and placing the expected incisions within the relaxed skin tension lines where possible.    The area thus outlined was incised deep to adipose tissue with a #15 scalpel blade.  The skin margins were undermined to an appropriate distance in all directions utilizing iris scissors. Complex Repair And Ftsg Text: The defect edges were debeveled with a #15 scalpel blade.  The primary defect was closed partially with a complex linear closure.  Given the location of the defect, shape of the defect and the proximity to free margins a full thickness skin graft was deemed most appropriate to repair the remaining defect.  The graft was trimmed to fit the size of the remaining defect.  The graft was then placed in the primary defect, oriented appropriately, and sutured into place. Complex Repair And Burow's Graft Text: The defect edges were debeveled with a #15 scalpel blade.  The primary defect was closed partially with a complex linear closure.  Given the location of the defect, shape of the defect, the proximity to free margins and the presence of a standing cone deformity a Burow's graft was deemed most appropriate to repair the remaining defect.  The graft was trimmed to fit the size of the remaining defect.  The graft was then placed in the primary defect, oriented appropriately, and sutured into place. Complex Repair And Split-Thickness Skin Graft Text: The defect edges were debeveled with a #15 scalpel blade.  The primary defect was closed partially with a complex linear closure.  Given the location of the defect, shape of the defect and the proximity to free margins a split thickness skin graft was deemed most appropriate to repair the remaining defect.  The graft was trimmed to fit the size of the remaining defect.  The graft was then placed in the primary defect, oriented appropriately, and sutured into place. Complex Repair And Epidermal Autograft Text: The defect edges were debeveled with a #15 scalpel blade.  The primary defect was closed partially with a complex linear closure.  Given the location of the defect, shape of the defect and the proximity to free margins an epidermal autograft was deemed most appropriate to repair the remaining defect.  The graft was trimmed to fit the size of the remaining defect.  The graft was then placed in the primary defect, oriented appropriately, and sutured into place. Complex Repair And Dermal Autograft Text: The defect edges were debeveled with a #15 scalpel blade.  The primary defect was closed partially with a complex linear closure.  Given the location of the defect, shape of the defect and the proximity to free margins an dermal autograft was deemed most appropriate to repair the remaining defect.  The graft was trimmed to fit the size of the remaining defect.  The graft was then placed in the primary defect, oriented appropriately, and sutured into place. Complex Repair And Tissue Cultured Epidermal Autograft Text: The defect edges were debeveled with a #15 scalpel blade.  The primary defect was closed partially with a complex linear closure.  Given the location of the defect, shape of the defect and the proximity to free margins an tissue cultured epidermal autograft was deemed most appropriate to repair the remaining defect.  The graft was trimmed to fit the size of the remaining defect.  The graft was then placed in the primary defect, oriented appropriately, and sutured into place. Complex Repair And Xenograft Text: The defect edges were debeveled with a #15 scalpel blade.  The primary defect was closed partially with a complex linear closure.  Given the location of the defect, shape of the defect and the proximity to free margins an tissue cultured epidermal autograft was deemed most appropriate to repair the remaining defect.  The graft was trimmed to fit the size of the remaining defect.  The graft was then placed in the primary defect, oriented appropriately, and sutured into place. Complex Repair And Skin Substitute Graft Text: The defect edges were debeveled with a #15 scalpel blade.  The primary defect was closed partially with a complex linear closure.  Given the location of the remaining defect, shape of the defect and the proximity to free margins a skin substitute graft was deemed most appropriate to repair the remaining defect.  The graft was trimmed to fit the size of the remaining defect.  The graft was then placed in the primary defect, oriented appropriately, and sutured into place. Path Notes (To The Dermatopathologist): Please check margins. Consent was obtained from the patient. The risks and benefits to therapy were discussed in detail. Specifically, the risks of infection, scarring, bleeding, prolonged wound healing, incomplete removal, allergy to anesthesia, nerve injury and recurrence were addressed. Prior to the procedure, the treatment site was clearly identified and confirmed by the patient. All components of Universal Protocol/PAUSE Rule completed. Post-Care Instructions: I reviewed with the patient in detail post-care instructions. Patient is not to engage in any heavy lifting, exercise, or swimming for the next 14 days. Should the patient develop any fevers, chills, bleeding, severe pain patient will contact the office immediately. Home Suture Removal Text: Patient was provided a home suture removal kit and will remove their sutures at home.  If they have any questions or difficulties they will call the office. Where Do You Want The Question To Include Opioid Counseling Located?: Case Summary Tab Billing Type: Third-Party Bill Information: Selecting Yes will display possible errors in your note based on the variables you have selected. This validation is only offered as a suggestion for you. PLEASE NOTE THAT THE VALIDATION TEXT WILL BE REMOVED WHEN YOU FINALIZE YOUR NOTE. IF YOU WANT TO FAX A PRELIMINARY NOTE YOU WILL NEED TO TOGGLE THIS TO 'NO' IF YOU DO NOT WANT IT IN YOUR FAXED NOTE.

## 2023-08-01 NOTE — PROGRESS NOTE ADULT - ASSESSMENT
A/P: 87M with recent CVA p/w presented with fever, sepsis, bacteremia with E. coli.   ct no gall stones, but sludge in  gb, now abnormal lfts but improving  In ED had Acute stroke, was not TPA candidate given recent stroke, no large vessel occlusion  was on aspirin, plavix    1. CVA. No evidence of afib on linq. No CSOE on kieran.   Pt currently on asa/plavix/statin. Awaiting MRI.   ?Benefit from starting anticoagulation (DOAC) with plavix.   Neuro f/u pending.     2. Bacteremia. ecoli sepsis. Cont abx. Cx pending.   No thickened wall, but tumefactic sludge in GB  Mildly improved.             3. Continue tube feeds. Speech/swallow pending.    4. Hyperlipidemia- continue statin.    5. HTN. BP stable.     6. DVT proph.     7

## 2024-10-07 NOTE — ED ADULT NURSE NOTE - CAS ELECT INFOMATION PROVIDED
VTE ppx: On eliquis    Discharge planning to [facility]  To f/u with PMD and pulmonary as outpatient. DC instructions
